# Patient Record
Sex: FEMALE | Race: WHITE | NOT HISPANIC OR LATINO | Employment: FULL TIME | URBAN - METROPOLITAN AREA
[De-identification: names, ages, dates, MRNs, and addresses within clinical notes are randomized per-mention and may not be internally consistent; named-entity substitution may affect disease eponyms.]

---

## 2018-01-14 NOTE — RESULT NOTES
Verified Results  (Q) THINPREP TIS PAP AND HPV mRNA E6/E7 REFLEX HPV 16,18/45 [id number] 12:00AM Chapo Moreno     Test Name Result Flag Reference   CLINICAL INFORMATION:       / DISTANT HX OF MEE I   LMP:      NONE GIVEN   PREV  PAP:      NONE GIVEN   PREV  BX:      NONE GIVEN   SOURCE:      Cervix, Endocervix   STATEMENT OF ADEQUACY:      Satisfactory for evaluation  Endocervical/transformation zone component  present  Age and/or menstrual status not provided   INTERPRETATION/RESULT:      Negative for intraepithelial lesion or malignancy  INFECTION:      Fungal organisms morphologically consistent with  Patsy spp  COMMENT:      This Pap test has been evaluated with computer  assisted technology  CYTOTECHNOLOGIST:      SARAH RANDHAWA(ASCP)  CT screening location: [facility], [address]   REVIEW CYTOTECHNOLOGIST:      SARAH CABRERA(ASCP)  CT screening location: [facility]  [facility], [address] [place] [address]   HPV mRNA E6/E7 Not Detected  Not Detected   This test was performed using the APTIMA HPV Assay (Ej 32 )  This assay detects E6/E7 viral messenger RNA (mRNA) from 14  high-risk HPV types (16,18,31,33,35,39,45,51,52,56,58,59,66,68)

## 2019-01-08 ENCOUNTER — OFFICE VISIT (OUTPATIENT)
Dept: FAMILY MEDICINE CLINIC | Facility: CLINIC | Age: 44
End: 2019-01-08
Payer: COMMERCIAL

## 2019-01-08 VITALS
WEIGHT: 215 LBS | HEART RATE: 96 BPM | DIASTOLIC BLOOD PRESSURE: 78 MMHG | SYSTOLIC BLOOD PRESSURE: 120 MMHG | BODY MASS INDEX: 38.09 KG/M2 | HEIGHT: 63 IN

## 2019-01-08 DIAGNOSIS — F41.9 ANXIETY: ICD-10-CM

## 2019-01-08 DIAGNOSIS — R63.5 EXCESSIVE WEIGHT GAIN: Primary | ICD-10-CM

## 2019-01-08 DIAGNOSIS — E66.9 OBESITY (BMI 35.0-39.9 WITHOUT COMORBIDITY): ICD-10-CM

## 2019-01-08 DIAGNOSIS — R00.2 PALPITATIONS: ICD-10-CM

## 2019-01-08 DIAGNOSIS — Z12.39 BREAST CANCER SCREENING: ICD-10-CM

## 2019-01-08 PROCEDURE — 3725F SCREEN DEPRESSION PERFORMED: CPT | Performed by: FAMILY MEDICINE

## 2019-01-08 PROCEDURE — 99203 OFFICE O/P NEW LOW 30 MIN: CPT | Performed by: FAMILY MEDICINE

## 2019-01-08 RX ORDER — CHOLECALCIFEROL (VITAMIN D3) 125 MCG
2000 CAPSULE ORAL DAILY
COMMUNITY

## 2019-01-08 RX ORDER — ASPIRIN 81 MG/1
81 TABLET ORAL DAILY
COMMUNITY
End: 2019-03-18 | Stop reason: SINTOL

## 2019-01-08 RX ORDER — DIPHENOXYLATE HYDROCHLORIDE AND ATROPINE SULFATE 2.5; .025 MG/1; MG/1
1 TABLET ORAL DAILY
COMMUNITY

## 2019-01-08 RX ORDER — ZINC GLUCONATE 50 MG
TABLET ORAL
COMMUNITY

## 2019-01-08 RX ORDER — PARSLEY 450 MG
CAPSULE ORAL
COMMUNITY

## 2019-01-08 NOTE — PATIENT INSTRUCTIONS

## 2019-01-08 NOTE — PROGRESS NOTES
Assessment/Plan:    Anxiety  Patient would prefer to wait until the lab tests are back to possibly start and antianxiety agent  Breast cancer screening  Mammogram ordered    Excessive weight gain  Patient to get fasting labs and follow-up in 1 week  There is no family history of diabetes  Palpitations  She had a full cardiac workup which was negative  Diagnoses and all orders for this visit:    Excessive weight gain  -     CBC and differential  -     Comprehensive metabolic panel  -     Lipid Panel with Direct LDL reflex  -     TSH, 3rd generation  -     Ambulatory referral to Endocrinology; Future    Breast cancer screening  -     Mammo screening bilateral w 3d & cad; Future    Palpitations  -     CBC and differential  -     Comprehensive metabolic panel  -     Lipid Panel with Direct LDL reflex  -     TSH, 3rd generation  -     Ambulatory referral to Endocrinology; Future    Anxiety  -     CBC and differential  -     Comprehensive metabolic panel  -     Lipid Panel with Direct LDL reflex  -     TSH, 3rd generation  -     Ambulatory referral to Endocrinology; Future    Obesity (BMI 35 0-39 9 without comorbidity)    Other orders  -     Zinc 50 MG TABS; Take by mouth  -     Ashwagandha 500 MG CAPS; Take by mouth  -     Cholecalciferol (VITAMIN D3) 2000 units TABS; Take 2,000 Units by mouth daily  -     Garlic 4753 MG CAPS; Take by mouth  -     multivitamin (THERAGRAN) TABS; Take 1 tablet by mouth daily  -     aspirin (ECOTRIN LOW STRENGTH) 81 mg EC tablet; Take 81 mg by mouth daily          Subjective:   Pt c/o feeling jittery, "constant panic", racing heart, difficulty sleeping  Difficulty losing weight  Hair growth on face  Symptoms present "years"  Notes symptoms are somewhat improved over summer months  -  [facility]     Patient ID: Dinesh Figueredo is a 44 y o  female      This is a 50-year-old female who comes in with symptoms of rapid heart rate and was in the emergency room whereby she had a full cardiac workup by a cardiologist which was negative  She does have a history of anxiety and there is a history of anxiety in the family  She feels jittery at times and can work herself into a panic attack  She has had a weight gain and has been unable to get the weight off and her weight is currently 215 lb  Her BMI was 38 0  Her blood pressure is stable at 120/78  She does have a fatty lump on her posterior neck and does have some menstrual irregularities  She is also concerned about the hair growth on her face  She is requesting an endocrinology consult and that will be arranged along with basic lab work  The following portions of the patient's history were reviewed and updated as appropriate: allergies, current medications, past family history, past medical history, past social history, past surgical history and problem list     Review of Systems   Constitutional: Negative  Negative for fatigue and fever  HENT: Negative  Eyes: Negative  Respiratory: Negative  Negative for shortness of breath  Cardiovascular: Positive for palpitations  Negative for chest pain  Gastrointestinal: Negative  Endocrine: Negative  Genitourinary: Positive for menstrual problem  Musculoskeletal: Negative  Skin: Negative  Allergic/Immunologic: Negative  Neurological: Negative  Hematological: Negative  Psychiatric/Behavioral: Negative  History of anxiety         Objective:      /78 (BP Location: Left arm, Patient Position: Sitting, Cuff Size: Large)   Pulse 96   Ht 5' 3" (1 6 m)   Wt 97 5 kg (215 lb)   BMI 38 09 kg/m²          Physical Exam   Constitutional: She is oriented to person, place, and time  She appears well-developed and well-nourished  HENT:   Head: Normocephalic  Right Ear: External ear normal    Left Ear: External ear normal    Mouth/Throat: Oropharynx is clear and moist    Eyes: Pupils are equal, round, and reactive to light   Conjunctivae and EOM are normal    Neck: Normal range of motion  Neck supple  Cardiovascular: Normal rate, regular rhythm and normal heart sounds  Pulmonary/Chest: Effort normal and breath sounds normal    Abdominal: Soft  Bowel sounds are normal    Musculoskeletal: Normal range of motion  Neurological: She is alert and oriented to person, place, and time  Skin: Skin is warm  Psychiatric: She has a normal mood and affect  Her behavior is normal  Judgment and thought content normal          BMI Counseling: Body mass index is 38 09 kg/m²  Discussed the patient's BMI with her  The BMI is above average  BMI counseling and education was provided to the patient  Nutrition recommendations include reducing portion sizes, decreasing overall calorie intake, moderation in carbohydrate intake and reducing intake of cholesterol  Exercise recommendations include moderate aerobic physical activity for 150 minutes/week

## 2019-01-11 ENCOUNTER — APPOINTMENT (OUTPATIENT)
Dept: LAB | Facility: HOSPITAL | Age: 44
End: 2019-01-11
Payer: COMMERCIAL

## 2019-01-11 LAB
ALBUMIN SERPL BCP-MCNC: 3.6 G/DL (ref 3.5–5)
ALP SERPL-CCNC: 74 U/L (ref 46–116)
ALT SERPL W P-5'-P-CCNC: 36 U/L (ref 12–78)
ANION GAP SERPL CALCULATED.3IONS-SCNC: 8 MMOL/L (ref 4–13)
AST SERPL W P-5'-P-CCNC: 18 U/L (ref 5–45)
BASOPHILS # BLD AUTO: 0.08 THOUSANDS/ΜL (ref 0–0.1)
BASOPHILS NFR BLD AUTO: 1 % (ref 0–1)
BILIRUB SERPL-MCNC: 0.67 MG/DL (ref 0.2–1)
BUN SERPL-MCNC: 10 MG/DL (ref 5–25)
CALCIUM SERPL-MCNC: 9.2 MG/DL (ref 8.3–10.1)
CHLORIDE SERPL-SCNC: 103 MMOL/L (ref 100–108)
CHOLEST SERPL-MCNC: 209 MG/DL (ref 50–200)
CO2 SERPL-SCNC: 27 MMOL/L (ref 21–32)
CREAT SERPL-MCNC: 0.79 MG/DL (ref 0.6–1.3)
EOSINOPHIL # BLD AUTO: 0.35 THOUSAND/ΜL (ref 0–0.61)
EOSINOPHIL NFR BLD AUTO: 5 % (ref 0–6)
ERYTHROCYTE [DISTWIDTH] IN BLOOD BY AUTOMATED COUNT: 13.2 % (ref 11.6–15.1)
GFR SERPL CREATININE-BSD FRML MDRD: 93 ML/MIN/1.73SQ M
GLUCOSE P FAST SERPL-MCNC: 99 MG/DL (ref 65–99)
HCT VFR BLD AUTO: 40.7 % (ref 34.8–46.1)
HDLC SERPL-MCNC: 39 MG/DL (ref 40–60)
HGB BLD-MCNC: 12.9 G/DL (ref 11.5–15.4)
IMM GRANULOCYTES # BLD AUTO: 0.03 THOUSAND/UL (ref 0–0.2)
IMM GRANULOCYTES NFR BLD AUTO: 0 % (ref 0–2)
LDLC SERPL CALC-MCNC: 151 MG/DL (ref 0–100)
LYMPHOCYTES # BLD AUTO: 1.92 THOUSANDS/ΜL (ref 0.6–4.47)
LYMPHOCYTES NFR BLD AUTO: 26 % (ref 14–44)
MCH RBC QN AUTO: 28.3 PG (ref 26.8–34.3)
MCHC RBC AUTO-ENTMCNC: 31.7 G/DL (ref 31.4–37.4)
MCV RBC AUTO: 89 FL (ref 82–98)
MONOCYTES # BLD AUTO: 0.44 THOUSAND/ΜL (ref 0.17–1.22)
MONOCYTES NFR BLD AUTO: 6 % (ref 4–12)
NEUTROPHILS # BLD AUTO: 4.51 THOUSANDS/ΜL (ref 1.85–7.62)
NEUTS SEG NFR BLD AUTO: 62 % (ref 43–75)
NRBC BLD AUTO-RTO: 0 /100 WBCS
PLATELET # BLD AUTO: 325 THOUSANDS/UL (ref 149–390)
PMV BLD AUTO: 8.7 FL (ref 8.9–12.7)
POTASSIUM SERPL-SCNC: 4.4 MMOL/L (ref 3.5–5.3)
PROT SERPL-MCNC: 7.7 G/DL (ref 6.4–8.2)
RBC # BLD AUTO: 4.56 MILLION/UL (ref 3.81–5.12)
SODIUM SERPL-SCNC: 138 MMOL/L (ref 136–145)
TRIGL SERPL-MCNC: 96 MG/DL
TSH SERPL DL<=0.05 MIU/L-ACNC: 2.35 UIU/ML (ref 0.36–3.74)
WBC # BLD AUTO: 7.33 THOUSAND/UL (ref 4.31–10.16)

## 2019-01-11 PROCEDURE — 80053 COMPREHEN METABOLIC PANEL: CPT | Performed by: FAMILY MEDICINE

## 2019-01-11 PROCEDURE — 84443 ASSAY THYROID STIM HORMONE: CPT | Performed by: FAMILY MEDICINE

## 2019-01-11 PROCEDURE — 85025 COMPLETE CBC W/AUTO DIFF WBC: CPT | Performed by: FAMILY MEDICINE

## 2019-01-11 PROCEDURE — 80061 LIPID PANEL: CPT | Performed by: FAMILY MEDICINE

## 2019-01-11 PROCEDURE — 36415 COLL VENOUS BLD VENIPUNCTURE: CPT | Performed by: FAMILY MEDICINE

## 2019-01-14 ENCOUNTER — HOSPITAL ENCOUNTER (OUTPATIENT)
Dept: MAMMOGRAPHY | Facility: MEDICAL CENTER | Age: 44
Discharge: HOME | End: 2019-01-14
Payer: COMMERCIAL

## 2019-01-14 VITALS — WEIGHT: 215 LBS | HEIGHT: 63 IN | BODY MASS INDEX: 38.09 KG/M2

## 2019-01-14 DIAGNOSIS — Z12.39 BREAST CANCER SCREENING: ICD-10-CM

## 2019-01-14 PROCEDURE — 77067 SCR MAMMO BI INCL CAD: CPT

## 2019-01-14 PROCEDURE — 77063 BREAST TOMOSYNTHESIS BI: CPT

## 2019-01-18 ENCOUNTER — TELEPHONE (OUTPATIENT)
Dept: FAMILY MEDICINE CLINIC | Facility: CLINIC | Age: 44
End: 2019-01-18

## 2019-01-18 ENCOUNTER — OFFICE VISIT (OUTPATIENT)
Dept: FAMILY MEDICINE CLINIC | Facility: CLINIC | Age: 44
End: 2019-01-18
Payer: COMMERCIAL

## 2019-01-18 VITALS
HEIGHT: 63 IN | SYSTOLIC BLOOD PRESSURE: 112 MMHG | WEIGHT: 215 LBS | HEART RATE: 68 BPM | DIASTOLIC BLOOD PRESSURE: 76 MMHG | BODY MASS INDEX: 38.09 KG/M2

## 2019-01-18 DIAGNOSIS — R63.5 EXCESSIVE WEIGHT GAIN: ICD-10-CM

## 2019-01-18 DIAGNOSIS — E78.2 MIXED HYPERLIPIDEMIA: Primary | ICD-10-CM

## 2019-01-18 DIAGNOSIS — F41.9 ANXIETY: ICD-10-CM

## 2019-01-18 DIAGNOSIS — E66.9 OBESITY (BMI 35.0-39.9 WITHOUT COMORBIDITY): ICD-10-CM

## 2019-01-18 PROCEDURE — 99214 OFFICE O/P EST MOD 30 MIN: CPT | Performed by: FAMILY MEDICINE

## 2019-01-18 PROCEDURE — 3008F BODY MASS INDEX DOCD: CPT | Performed by: FAMILY MEDICINE

## 2019-01-18 RX ORDER — ATORVASTATIN CALCIUM 10 MG/1
10 TABLET, FILM COATED ORAL DAILY
Qty: 90 TABLET | Refills: 3 | Status: SHIPPED | OUTPATIENT
Start: 2019-01-18 | End: 2019-03-18 | Stop reason: SINTOL

## 2019-01-18 NOTE — TELEPHONE ENCOUNTER
Per Yecenia Mckinney, he want's Pt seen sooner than current appt schedule in Sept 2019  Called Endocrinology office and they stated they can get Pt in to be seen in Feb 2019,  they will check with  and call our office back with date/time  We will then need to call Pt and advise of new appt date and time

## 2019-01-18 NOTE — TELEPHONE ENCOUNTER
Rec'd a call back from \[facility]\"" at the Endocrinologist   Pt's appt is 2/20/19 at 9:20AM at the [facility] location  Called Pt and advised on new appt date/time/location

## 2019-01-18 NOTE — PROGRESS NOTES
Assessment/Plan:    Anxiety  The patient declines anti- anxiety medication at this time  Mixed hyperlipidemia  Total cholesterol was 209 and her LDL was 151  Both of her parents are on cholesterol medication and she was started on a atorvastatin 10 mg 1 daily today  Obesity (BMI 35 0-39 9 without comorbidity)  Her BMI is 38 0 and at her visit last week we discussed diet, exercise and weight loss  Excessive weight gain  Patient's endocrinology appointment will try to be moved up because she was given an appointment for September of 2019 and she needs to be seen sooner  Diagnoses and all orders for this visit:    Mixed hyperlipidemia  -     atorvastatin (LIPITOR) 10 mg tablet; Take 1 tablet (10 mg total) by mouth daily  -     Lipid Panel with Direct LDL reflex; Future  -     Comprehensive metabolic panel; Future    Anxiety    Obesity (BMI 35 0-39 9 without comorbidity)    Excessive weight gain          Subjective: Follow up anxiousness  Review BW results  -  [facility]     Patient ID: Dante Araiza is a 44 y o  female  This is a 42-year-old female who comes in for recheck of her labs which were done approximately a week ago  She is thinking that she has Cushing's syndrome and she was set up for an endocrinology appointment however they could not get her in until September of 2019  Arrangements are being made to try to get her in to an endocrinologist sooner  Blood pressure is 112/76 and her weight remains the same at 215 lb  BMI is 38 0  Reviewing her labs, her TSH was normal, CBC was within normal range, glucose was borderline at 99 and her LDL was elevated at 151  Both of her parents have hyperlipidemia and are on medication  Total cholesterol was 209 and her triglycerides were 96 and her HDL was 39           The following portions of the patient's history were reviewed and updated as appropriate: allergies, current medications, past family history, past medical history, past social history, past surgical history and problem list     Review of Systems   Constitutional: Negative  HENT: Negative  Eyes: Negative  Respiratory: Negative  Cardiovascular: Negative  Gastrointestinal: Negative  Endocrine: Negative  Genitourinary: Negative  Musculoskeletal: Negative  Skin: Negative  Allergic/Immunologic: Negative  Neurological: Negative  Hematological: Negative  Psychiatric/Behavioral: Negative  Objective:      /76 (BP Location: Left arm, Patient Position: Sitting, Cuff Size: Large)   Pulse 68   Ht 5' 3" (1 6 m)   Wt 97 5 kg (215 lb)   LMP 01/11/2019   BMI 38 09 kg/m²          Physical Exam   Constitutional: She is oriented to person, place, and time  She appears well-developed and well-nourished  HENT:   Head: Normocephalic  Right Ear: External ear normal    Left Ear: External ear normal    Mouth/Throat: Oropharynx is clear and moist    Eyes: Pupils are equal, round, and reactive to light  Conjunctivae and EOM are normal    Neck: Normal range of motion  Neck supple  Cardiovascular: Normal rate, regular rhythm and normal heart sounds  Pulmonary/Chest: Effort normal and breath sounds normal    Abdominal: Soft  Bowel sounds are normal    Musculoskeletal: Normal range of motion  Neurological: She is alert and oriented to person, place, and time  Skin: Skin is warm  Psychiatric: She has a normal mood and affect  Her behavior is normal  Judgment and thought content normal    Nursing note and vitals reviewed  BMI Counseling: Body mass index is 38 09 kg/m²  Discussed the patient's BMI with her  The BMI is above average  BMI counseling and education was provided to the patient   Nutrition recommendations include reducing portion sizes, decreasing overall calorie intake, 3-5 servings of fruits/vegetables daily, reducing fast food intake, consuming healthier snacks, decreasing soda and/or juice intake, moderation in carbohydrate intake and reducing intake of cholesterol  Exercise recommendations include moderate aerobic physical activity for 150 minutes/week

## 2019-01-18 NOTE — PATIENT INSTRUCTIONS

## 2019-02-20 ENCOUNTER — CONSULT (OUTPATIENT)
Dept: ENDOCRINOLOGY | Facility: CLINIC | Age: 44
End: 2019-02-20
Payer: COMMERCIAL

## 2019-02-20 VITALS
HEIGHT: 63 IN | DIASTOLIC BLOOD PRESSURE: 88 MMHG | BODY MASS INDEX: 39.62 KG/M2 | WEIGHT: 223.6 LBS | HEART RATE: 89 BPM | SYSTOLIC BLOOD PRESSURE: 130 MMHG

## 2019-02-20 DIAGNOSIS — E66.9 OBESITY (BMI 35.0-39.9 WITHOUT COMORBIDITY): ICD-10-CM

## 2019-02-20 DIAGNOSIS — N92.6 IRREGULAR MENSTRUAL CYCLE: ICD-10-CM

## 2019-02-20 DIAGNOSIS — L68.0 HIRSUTISM: ICD-10-CM

## 2019-02-20 DIAGNOSIS — R63.5 ABNORMAL WEIGHT GAIN: Primary | ICD-10-CM

## 2019-02-20 DIAGNOSIS — G47.9 SLEEP DISTURBANCES: ICD-10-CM

## 2019-02-20 DIAGNOSIS — M79.10 MYALGIA: ICD-10-CM

## 2019-02-20 PROCEDURE — 99244 OFF/OP CNSLTJ NEW/EST MOD 40: CPT | Performed by: INTERNAL MEDICINE

## 2019-02-20 RX ORDER — DEXAMETHASONE 1 MG
TABLET ORAL
Qty: 1 TABLET | Refills: 0 | Status: SHIPPED | OUTPATIENT
Start: 2019-02-20 | End: 2019-03-18 | Stop reason: ALTCHOICE

## 2019-02-20 NOTE — ASSESSMENT & PLAN NOTE
Differential diagnosis include PCOS, thyroid dysfunction, Cushing's or hyperprolactinemia-will order labs for workup

## 2019-02-20 NOTE — LETTER
February 20, 2019     [id number]  [place] [address] [place]    Patient: Blaze De La Cruz   YOB: 1975   Date of Visit: 2/20/2019       Dear Dr Adán Baltazar:    Thank you for referring Erlinda Alonso to me for evaluation  Below are my notes for this consultation  If you have questions, please do not hesitate to call me  I look forward to following your patient along with you           Sincerely,        Peace Demarco MD        CC: No Recipients  Peace Demarco MD  2/20/2019 11:32 AM  Sign at close encounter   Blaze De La Cruz 44 y o  female MRN: [id number]    Encounter: [id number]      Assessment/Plan   Problem List Items Addressed This Visit        Musculoskeletal and Integument    Hirsutism     Will check total and free testosterone as well as DHEA sulfate         Relevant Orders    DHEA-sulfate Lab Collect       Other    Obesity (BMI 35 0-39 9 without comorbidity)    Relevant Orders    Vitamin D 25 hydroxy Lab Collect    Ambulatory referral to Sleep Medicine    Abnormal weight gain - Primary     Dexamethasone suppression test to rule out Cushing's         Relevant Medications    dexamethasone (DECADRON) 1 mg tablet    Other Relevant Orders    TSH, 3rd generation Lab Collect    T4, free Lab Collect    Cortisol Level, AM Specimen Lab Collect    Dexamethasone    Phosphorus Lab Collect    Basic metabolic panel Lab Collect    Ambulatory referral to Sleep Medicine    Irregular menstrual cycle     Differential diagnosis include PCOS, thyroid dysfunction, Cushing's or hyperprolactinemia-will order labs for workup         Relevant Orders    Prolactin Lab Collect    Testosterone, free, total Lab Collect    Sleep disturbances     Referred for sleep evaluation         Relevant Orders    Ambulatory referral to Sleep Medicine    Myalgia    Relevant Orders    Vitamin D 25 hydroxy Lab Collect    Phosphorus Lab Collect    Basic metabolic panel Lab Collect          CC:   Weight gain    History of Present Illness     HPI:  42-year-old woman referred here for evaluation of multiple symptoms  She has been complaining fatigue , sleep disturbances , difficulty loosing weight the past few years  Weight gain 15-20 lb in the past couple of years  C/o palpitations , anxiety , panic attacks   C/o myalgias ,   C/o loose BM - since jan  Menstrual cycles irregular -for the past 2 years -no galactorrhea   C/o hirsutism ,skin breakouts - shaves every few days   C/o sob on exertion on climbing stairs   No strea       Review of Systems   Constitutional: Positive for fatigue  Eyes: Negative for visual disturbance  Respiratory: Positive for shortness of breath  Negative for cough  Cardiovascular: Positive for palpitations  Negative for leg swelling  Gastrointestinal: Positive for diarrhea  Negative for constipation, nausea and vomiting  Endocrine: Negative for polydipsia and polyuria  Genitourinary: Positive for menstrual problem  Musculoskeletal: Positive for arthralgias and myalgias  Negative for gait problem  Skin: Negative for color change, pallor, rash and wound  Neurological: Positive for weakness  Psychiatric/Behavioral: Positive for sleep disturbance  The patient is nervous/anxious  All other systems reviewed and are negative        Historical Information   Past Medical History:   Diagnosis Date    Breast nodule 2009    Fibroglandular changes    Migraine with aura and without status migrainosus, not intractable     Peptic ulcer      Past Surgical History:   Procedure Laterality Date    ORTHODONTIC TREATMENT      16 yo; incisors pulled down    WISDOM TOOTH EXTRACTION       Social History   Social History     Substance and Sexual Activity   Alcohol Use Yes    Comment: social 1x-2x per month     Social History     Substance and Sexual Activity   Drug Use No     Social History     Tobacco Use   Smoking Status Never Smoker   Smokeless Tobacco Never Used     Family History:   Family History   Problem Relation Age of Onset    Rheum arthritis Mother     Hypertension Mother     Hyperlipidemia Mother     Hepatitis Father         B    Hyperlipidemia Father     Hypertension Father     Lung cancer Maternal Grandmother         over [de-identified]    Rheum arthritis Maternal Grandmother     Kidney disease Maternal Grandfather     Anxiety disorder Paternal Grandmother     Depression Paternal Grandmother     Bipolar disorder Paternal Grandmother     Lung cancer Paternal Grandmother         over [de-identified]    Breast cancer Maternal Aunt 29    Nephrolithiasis Family     Anxiety disorder Sister     Depression Sister     Kidney disease Paternal Grandfather        Meds/Allergies   Current Outpatient Medications   Medication Sig Dispense Refill    Ashwagandha 500 MG CAPS Take by mouth      atorvastatin (LIPITOR) 10 mg tablet Take 1 tablet (10 mg total) by mouth daily 90 tablet 3    Cholecalciferol (VITAMIN D3) 2000 units TABS Take 2,000 Units by mouth daily      Garlic 1001 MG CAPS Take by mouth      multivitamin (THERAGRAN) TABS Take 1 tablet by mouth daily      Zinc 50 MG TABS Take by mouth      aspirin (ECOTRIN LOW STRENGTH) 81 mg EC tablet Take 81 mg by mouth daily      dexamethasone (DECADRON) 1 mg tablet 1 tab at 11 pm the night  before labs 1 tablet 0     No current facility-administered medications for this visit  No Known Allergies    Objective   Vitals: Blood pressure 130/88, pulse 89, height 5' 3" (1 6 m), weight 101 kg (223 lb 9 6 oz)  Physical Exam   Constitutional: She is oriented to person, place, and time  She appears well-developed and well-nourished  No distress  Central obesity   HENT:   Head: Normocephalic and atraumatic  Eyes: EOM are normal  No scleral icterus  Neck: Normal range of motion  Neck supple  No thyromegaly present  Dorsal pad of fat   Cardiovascular: Normal rate, regular rhythm and normal heart sounds  No murmur heard    Pulmonary/Chest: Effort normal and breath sounds normal  No respiratory distress  She has no wheezes  She has no rales  Abdominal: Soft  Bowel sounds are normal  She exhibits no distension  There is no tenderness  There is no guarding  Musculoskeletal: Normal range of motion  She exhibits no edema or deformity  Neurological: She is alert and oriented to person, place, and time  Skin: Skin is warm and dry  Psychiatric: She has a normal mood and affect  Her behavior is normal  Judgment and thought content normal        The history was obtained from the review of the chart, patient and family  Lab Results:   Lab Results   Component Value Date/Time    Potassium 4 4 01/11/2019 09:20 AM    Chloride 103 01/11/2019 09:20 AM    CO2 27 01/11/2019 09:20 AM    BUN 10 01/11/2019 09:20 AM    Creatinine 0 79 01/11/2019 09:20 AM    Glucose, Fasting 99 01/11/2019 09:20 AM    Calcium 9 2 01/11/2019 09:20 AM    eGFR 93 01/11/2019 09:20 AM    TSH 3RD GENERATON 2 348 01/11/2019 09:20 AM               Portions of the record may have been created with voice recognition software  Occasional wrong word or "sound a like" substitutions may have occurred due to the inherent limitations of voice recognition software  Read the chart carefully and recognize, using context, where substitutions have occurred

## 2019-02-20 NOTE — PROGRESS NOTES
Dong Rodriguez 44 y o  female MRN: [id number]    Encounter: [id number]      Assessment/Plan   Problem List Items Addressed This Visit        Musculoskeletal and Integument    Hirsutism     Will check total and free testosterone as well as DHEA sulfate         Relevant Orders    DHEA-sulfate Lab Collect       Other    Obesity (BMI 35 0-39 9 without comorbidity)    Relevant Orders    Vitamin D 25 hydroxy Lab Collect    Ambulatory referral to Sleep Medicine    Abnormal weight gain - Primary     Dexamethasone suppression test to rule out Cushing's         Relevant Medications    dexamethasone (DECADRON) 1 mg tablet    Other Relevant Orders    TSH, 3rd generation Lab Collect    T4, free Lab Collect    Cortisol Level, AM Specimen Lab Collect    Dexamethasone    Phosphorus Lab Collect    Basic metabolic panel Lab Collect    Ambulatory referral to Sleep Medicine    Irregular menstrual cycle     Differential diagnosis include PCOS, thyroid dysfunction, Cushing's or hyperprolactinemia-will order labs for workup         Relevant Orders    Prolactin Lab Collect    Testosterone, free, total Lab Collect    Sleep disturbances     Referred for sleep evaluation         Relevant Orders    Ambulatory referral to Sleep Medicine    Myalgia    Relevant Orders    Vitamin D 25 hydroxy Lab Collect    Phosphorus Lab Collect    Basic metabolic panel Lab Collect          CC:   Weight gain    History of Present Illness     HPI:  78-year-old woman referred here for evaluation of multiple symptoms  She has been complaining fatigue , sleep disturbances , difficulty loosing weight the past few years  Weight gain 15-20 lb in the past couple of years  C/o palpitations , anxiety , panic attacks   C/o myalgias ,   C/o loose BM - since jan  Menstrual cycles irregular -for the past 2 years -no galactorrhea     C/o hirsutism ,skin breakouts - shaves every few days   C/o sob on exertion on climbing stairs   No strea       Review of Systems Constitutional: Positive for fatigue  Eyes: Negative for visual disturbance  Respiratory: Positive for shortness of breath  Negative for cough  Cardiovascular: Positive for palpitations  Negative for leg swelling  Gastrointestinal: Positive for diarrhea  Negative for constipation, nausea and vomiting  Endocrine: Negative for polydipsia and polyuria  Genitourinary: Positive for menstrual problem  Musculoskeletal: Positive for arthralgias and myalgias  Negative for gait problem  Skin: Negative for color change, pallor, rash and wound  Neurological: Positive for weakness  Psychiatric/Behavioral: Positive for sleep disturbance  The patient is nervous/anxious  All other systems reviewed and are negative        Historical Information   Past Medical History:   Diagnosis Date    Breast nodule 2009    Fibroglandular changes    Migraine with aura and without status migrainosus, not intractable     Peptic ulcer      Past Surgical History:   Procedure Laterality Date    ORTHODONTIC TREATMENT      16 yo; incisors pulled down    WISDOM TOOTH EXTRACTION       Social History   Social History     Substance and Sexual Activity   Alcohol Use Yes    Comment: social 1x-2x per month     Social History     Substance and Sexual Activity   Drug Use No     Social History     Tobacco Use   Smoking Status Never Smoker   Smokeless Tobacco Never Used     Family History:   Family History   Problem Relation Age of Onset    Rheum arthritis Mother     Hypertension Mother     Hyperlipidemia Mother     Hepatitis Father         B    Hyperlipidemia Father     Hypertension Father     Lung cancer Maternal Grandmother         over [de-identified]    Rheum arthritis Maternal Grandmother     Kidney disease Maternal Grandfather     Anxiety disorder Paternal Grandmother     Depression Paternal Grandmother     Bipolar disorder Paternal Grandmother     Lung cancer Paternal Grandmother         over [de-identified]    Breast cancer Maternal Aunt 29    Nephrolithiasis Family     Anxiety disorder Sister     Depression Sister     Kidney disease Paternal Grandfather        Meds/Allergies   Current Outpatient Medications   Medication Sig Dispense Refill    Ashwagandha 500 MG CAPS Take by mouth      atorvastatin (LIPITOR) 10 mg tablet Take 1 tablet (10 mg total) by mouth daily 90 tablet 3    Cholecalciferol (VITAMIN D3) 2000 units TABS Take 2,000 Units by mouth daily      Garlic 8496 MG CAPS Take by mouth      multivitamin (THERAGRAN) TABS Take 1 tablet by mouth daily      Zinc 50 MG TABS Take by mouth      aspirin (ECOTRIN LOW STRENGTH) 81 mg EC tablet Take 81 mg by mouth daily      dexamethasone (DECADRON) 1 mg tablet 1 tab at 11 pm the night  before labs 1 tablet 0     No current facility-administered medications for this visit  No Known Allergies    Objective   Vitals: Blood pressure 130/88, pulse 89, height 5' 3" (1 6 m), weight 101 kg (223 lb 9 6 oz)  Physical Exam   Constitutional: She is oriented to person, place, and time  She appears well-developed and well-nourished  No distress  Central obesity   HENT:   Head: Normocephalic and atraumatic  Eyes: EOM are normal  No scleral icterus  Neck: Normal range of motion  Neck supple  No thyromegaly present  Dorsal pad of fat   Cardiovascular: Normal rate, regular rhythm and normal heart sounds  No murmur heard  Pulmonary/Chest: Effort normal and breath sounds normal  No respiratory distress  She has no wheezes  She has no rales  Abdominal: Soft  Bowel sounds are normal  She exhibits no distension  There is no tenderness  There is no guarding  Musculoskeletal: Normal range of motion  She exhibits no edema or deformity  Neurological: She is alert and oriented to person, place, and time  Skin: Skin is warm and dry  Psychiatric: She has a normal mood and affect   Her behavior is normal  Judgment and thought content normal        The history was obtained from the review of the chart, patient and family  Lab Results:   Lab Results   Component Value Date/Time    Potassium 4 4 01/11/2019 09:20 AM    Chloride 103 01/11/2019 09:20 AM    CO2 27 01/11/2019 09:20 AM    BUN 10 01/11/2019 09:20 AM    Creatinine 0 79 01/11/2019 09:20 AM    Glucose, Fasting 99 01/11/2019 09:20 AM    Calcium 9 2 01/11/2019 09:20 AM    eGFR 93 01/11/2019 09:20 AM    TSH 3RD GENERATON 2 348 01/11/2019 09:20 AM               Portions of the record may have been created with voice recognition software  Occasional wrong word or "sound a like" substitutions may have occurred due to the inherent limitations of voice recognition software  Read the chart carefully and recognize, using context, where substitutions have occurred

## 2019-03-01 ENCOUNTER — LAB (OUTPATIENT)
Dept: LAB | Facility: HOSPITAL | Age: 44
End: 2019-03-01
Attending: INTERNAL MEDICINE
Payer: COMMERCIAL

## 2019-03-01 ENCOUNTER — TELEPHONE (OUTPATIENT)
Dept: ENDOCRINOLOGY | Facility: CLINIC | Age: 44
End: 2019-03-01

## 2019-03-01 DIAGNOSIS — E66.9 OBESITY (BMI 35.0-39.9 WITHOUT COMORBIDITY): ICD-10-CM

## 2019-03-01 DIAGNOSIS — N92.6 IRREGULAR MENSTRUAL CYCLE: ICD-10-CM

## 2019-03-01 DIAGNOSIS — L68.0 HIRSUTISM: ICD-10-CM

## 2019-03-01 DIAGNOSIS — M79.10 MYALGIA: ICD-10-CM

## 2019-03-01 DIAGNOSIS — R63.5 ABNORMAL WEIGHT GAIN: ICD-10-CM

## 2019-03-01 LAB
25(OH)D3 SERPL-MCNC: 25.9 NG/ML (ref 30–100)
ANION GAP SERPL CALCULATED.3IONS-SCNC: 9 MMOL/L (ref 4–13)
BUN SERPL-MCNC: 10 MG/DL (ref 5–25)
CALCIUM SERPL-MCNC: 9 MG/DL (ref 8.3–10.1)
CHLORIDE SERPL-SCNC: 101 MMOL/L (ref 100–108)
CO2 SERPL-SCNC: 27 MMOL/L (ref 21–32)
CORTIS AM PEAK SERPL-MCNC: 0.5 UG/DL (ref 4.2–22.4)
CREAT SERPL-MCNC: 0.8 MG/DL (ref 0.6–1.3)
GFR SERPL CREATININE-BSD FRML MDRD: 91 ML/MIN/1.73SQ M
GLUCOSE P FAST SERPL-MCNC: 112 MG/DL (ref 65–99)
PHOSPHATE SERPL-MCNC: 3.2 MG/DL (ref 2.7–4.5)
POTASSIUM SERPL-SCNC: 3.8 MMOL/L (ref 3.5–5.3)
PROLACTIN SERPL-MCNC: 5.1 NG/ML
SODIUM SERPL-SCNC: 137 MMOL/L (ref 136–145)
T4 FREE SERPL-MCNC: 0.94 NG/DL (ref 0.76–1.46)
TSH SERPL DL<=0.05 MIU/L-ACNC: 2.05 UIU/ML (ref 0.36–3.74)

## 2019-03-01 PROCEDURE — 84443 ASSAY THYROID STIM HORMONE: CPT

## 2019-03-01 PROCEDURE — 82533 TOTAL CORTISOL: CPT

## 2019-03-01 PROCEDURE — 80375 DRUG/SUBSTANCE NOS 1-3: CPT

## 2019-03-01 PROCEDURE — 84146 ASSAY OF PROLACTIN: CPT

## 2019-03-01 PROCEDURE — 84100 ASSAY OF PHOSPHORUS: CPT

## 2019-03-01 PROCEDURE — 84403 ASSAY OF TOTAL TESTOSTERONE: CPT

## 2019-03-01 PROCEDURE — 84439 ASSAY OF FREE THYROXINE: CPT

## 2019-03-01 PROCEDURE — 84402 ASSAY OF FREE TESTOSTERONE: CPT

## 2019-03-01 PROCEDURE — 36415 COLL VENOUS BLD VENIPUNCTURE: CPT

## 2019-03-01 PROCEDURE — 80048 BASIC METABOLIC PNL TOTAL CA: CPT

## 2019-03-01 PROCEDURE — 82627 DEHYDROEPIANDROSTERONE: CPT

## 2019-03-01 PROCEDURE — 82306 VITAMIN D 25 HYDROXY: CPT

## 2019-03-01 NOTE — RESULT ENCOUNTER NOTE
Please call the patient regarding labs - some of the labs are still pending however from the labs that have been received-low cortisol which is an appropriate response to dexamethasone suppression test  Vitamin-D is low-start vitamin D3 2000 international units daily    Fasting glucose was a little high but that is likely secondary to the steroid that she took last night-will repeat in the future

## 2019-03-01 NOTE — TELEPHONE ENCOUNTER
----- Message from Edward Bai MD sent at 3/1/2019 11:31 AM EST -----  Please call the patient regarding labs - some of the labs are still pending however from the labs that have been received-low cortisol which is an appropriate response to dexamethasone suppression test  Vitamin-D is low-start vitamin D3 2000 international units daily    Fasting glucose was a little high but that is likely secondary to the steroid that she took last night-will repeat in the future

## 2019-03-02 LAB — DHEA-S SERPL-MCNC: 205 UG/DL (ref 57.3–279.2)

## 2019-03-03 LAB
TESTOST FREE SERPL-MCNC: 0.4 PG/ML (ref 0–4.2)
TESTOST SERPL-MCNC: 19 NG/DL (ref 8–48)

## 2019-03-09 NOTE — ASSESSMENT & PLAN NOTE
Her BMI is 38 0 and at her visit last week we discussed diet, exercise and weight loss 
Patient's endocrinology appointment will try to be moved up because she was given an appointment for September of 2019 and she needs to be seen sooner 
The patient declines anti- anxiety medication at this time 
Total cholesterol was 209 and her LDL was 151  Both of her parents are on cholesterol medication and she was started on a atorvastatin 10 mg 1 daily today 
WDL

## 2019-03-14 LAB — MISCELLANEOUS LAB TEST RESULT: NORMAL

## 2019-03-18 ENCOUNTER — OFFICE VISIT (OUTPATIENT)
Dept: FAMILY MEDICINE CLINIC | Facility: CLINIC | Age: 44
End: 2019-03-18
Payer: COMMERCIAL

## 2019-03-18 VITALS
WEIGHT: 213 LBS | DIASTOLIC BLOOD PRESSURE: 74 MMHG | BODY MASS INDEX: 37.74 KG/M2 | SYSTOLIC BLOOD PRESSURE: 140 MMHG | HEIGHT: 63 IN | HEART RATE: 100 BPM

## 2019-03-18 DIAGNOSIS — E66.9 OBESITY (BMI 35.0-39.9 WITHOUT COMORBIDITY): ICD-10-CM

## 2019-03-18 DIAGNOSIS — R07.9 CHEST PAIN, UNSPECIFIED TYPE: Primary | ICD-10-CM

## 2019-03-18 DIAGNOSIS — F41.9 ANXIETY: ICD-10-CM

## 2019-03-18 DIAGNOSIS — E78.2 MIXED HYPERLIPIDEMIA: ICD-10-CM

## 2019-03-18 PROCEDURE — 93000 ELECTROCARDIOGRAM COMPLETE: CPT | Performed by: FAMILY MEDICINE

## 2019-03-18 PROCEDURE — 99214 OFFICE O/P EST MOD 30 MIN: CPT | Performed by: FAMILY MEDICINE

## 2019-03-18 RX ORDER — PAROXETINE 10 MG/1
TABLET, FILM COATED ORAL
Qty: 30 TABLET | Refills: 3 | Status: SHIPPED | OUTPATIENT
Start: 2019-03-18 | End: 2019-10-08 | Stop reason: SDUPTHER

## 2019-03-18 RX ORDER — VITAMIN B COMPLEX
1 CAPSULE ORAL DAILY
COMMUNITY

## 2019-03-18 NOTE — ASSESSMENT & PLAN NOTE
Her weight is currently 213 lb down 10 lb from the previous visit and her BMI is 37 7  She knows she needs to continue to lose weight

## 2019-03-18 NOTE — ASSESSMENT & PLAN NOTE
The patient was placed on Paxil 5 mg daily because she is very sensitive to medication  She will return in 3-4 weeks for evaluation of whether to increase the medication to 10 mg daily or to leave it at 5 mg daily

## 2019-03-18 NOTE — ASSESSMENT & PLAN NOTE
Her previous lipid panel showed the LDL being 151 and was placed on atorvastatin 10 mg daily and she was unable to take the medication due to side effects of joint and muscle pain  She will be referred to [facility] Grimaldo

## 2019-03-18 NOTE — PROGRESS NOTES
Assessment and Plan:  Follow-up 3-4 weeks to recheck and see how the Paxil is working  Problem List Items Addressed This Visit        Other    Anxiety     The patient was placed on Paxil 5 mg daily because she is very sensitive to medication  She will return in 3-4 weeks for evaluation of whether to increase the medication to 10 mg daily or to leave it at 5 mg daily  Relevant Medications    PARoxetine (PAXIL) 10 mg tablet    Mixed hyperlipidemia     Her previous lipid panel showed the LDL being 151 and was placed on atorvastatin 10 mg daily and she was unable to take the medication due to side effects of joint and muscle pain  She will be referred to [facility]  Relevant Orders    Ambulatory referral to Cardiology    Obesity (BMI 35 0-39 9 without comorbidity)     Her weight is currently 213 lb down 10 lb from the previous visit and her BMI is 37 7  She knows she needs to continue to lose weight  Chest pain - Primary     Her EKG was read by Dr Jossie Jones as within normal limits and no acute changes  Relevant Orders    POCT ECG (Completed)                 Diagnoses and all orders for this visit:    Chest pain, unspecified type  -     POCT ECG    Anxiety  -     PARoxetine (PAXIL) 10 mg tablet; 1/2 tablet daily    Mixed hyperlipidemia  -     Ambulatory referral to Cardiology; Future    Obesity (BMI 35 0-39 9 without comorbidity)    Other orders  -     b complex vitamins capsule; Take 1 capsule by mouth daily              Subjective:      Patient ID: Vidal Murillo is a 44 y o  female  CC:    Chief Complaint   Patient presents with    Anxiety     pt states she has had multiple issues starting with side effects of Lipitor  She notes she had muscle and joint pain after 2 weeks of starting  medications  Endocrinologist recommended she stop all meds  Pt had fasting BW done 2 weeks ago  She now questions cardiac issues due left sided chest pain / heart burn    - [facility]       HPI:    This is a 46-year-old female who comes in complaining of chest pain which is is left-sided and radiating down the left arm  This has been off and on for several weeks now  Her father talked her into coming in to getting an EKG done here in the office and it was read by Dr Patricio Armstrong as no acute changes  She has been seen in the past by Dr Bryan Bhandari for an abnormal EKG  Her last lipid panel had her LDL being 151 and she was on the Lipitor for 2 weeks and was unable to take the medication due to side effects of muscle and joint pain  Her blood pressure today was 140/74 and her weight is down 10 lb from the previous visit to 213 lb with a BMI of 37 7  She is requesting some anxiety medicine due to her very stressful job and she will be placed on Paxil at 5 mg daily for 3-4 weeks  The following portions of the patient's history were reviewed and updated as appropriate: allergies, current medications, past family history, past medical history, past social history, past surgical history and problem list       Review of Systems   Constitutional: Negative  HENT: Negative  Eyes: Negative  Respiratory: Negative  Cardiovascular: Positive for chest pain  Negative for palpitations and leg swelling  Gastrointestinal: Negative  Endocrine: Negative  Genitourinary: Negative  Musculoskeletal: Negative  Skin: Negative  Allergic/Immunologic: Negative  Neurological: Negative  Hematological: Negative  Psychiatric/Behavioral: The patient is nervous/anxious  Data to review:       Objective:    Vitals:    03/18/19 1609   BP: 140/74   BP Location: Left arm   Patient Position: Sitting   Cuff Size: Large   Pulse: 100   Weight: 96 6 kg (213 lb)   Height: 5' 3" (1 6 m)        Physical Exam   Constitutional: She is oriented to person, place, and time  She appears well-developed and well-nourished  HENT:   Head: Normocephalic     Right Ear: External ear normal    Left Ear: External ear normal    Mouth/Throat: Oropharynx is clear and moist    Eyes: Pupils are equal, round, and reactive to light  Conjunctivae and EOM are normal    Neck: Normal range of motion  Neck supple  Cardiovascular: Normal rate, regular rhythm and normal heart sounds  Pulmonary/Chest: Effort normal and breath sounds normal    Abdominal: Soft  Bowel sounds are normal    Musculoskeletal: Normal range of motion  Neurological: She is alert and oriented to person, place, and time  Skin: Skin is warm  Psychiatric: She has a normal mood and affect  Her behavior is normal  Judgment and thought content normal    Nursing note and vitals reviewed  BMI Counseling: Body mass index is 37 73 kg/m²  Discussed the patient's BMI with her  The BMI is above average  BMI counseling and education was provided to the patient  Nutrition recommendations include reducing portion sizes, decreasing overall calorie intake, 3-5 servings of fruits/vegetables daily, reducing fast food intake, consuming healthier snacks, decreasing soda and/or juice intake, moderation in carbohydrate intake and reducing intake of cholesterol  Exercise recommendations include moderate aerobic physical activity for 150 minutes/week

## 2019-03-29 ENCOUNTER — APPOINTMENT (OUTPATIENT)
Dept: LAB | Facility: HOSPITAL | Age: 44
End: 2019-03-29
Payer: COMMERCIAL

## 2019-03-29 DIAGNOSIS — E78.2 MIXED HYPERLIPIDEMIA: ICD-10-CM

## 2019-03-29 LAB
ALBUMIN SERPL BCP-MCNC: 3.8 G/DL (ref 3.5–5)
ALP SERPL-CCNC: 68 U/L (ref 46–116)
ALT SERPL W P-5'-P-CCNC: 42 U/L (ref 12–78)
ANION GAP SERPL CALCULATED.3IONS-SCNC: 7 MMOL/L (ref 4–13)
AST SERPL W P-5'-P-CCNC: 36 U/L (ref 5–45)
BILIRUB SERPL-MCNC: 0.75 MG/DL (ref 0.2–1)
BUN SERPL-MCNC: 9 MG/DL (ref 5–25)
CALCIUM SERPL-MCNC: 8.9 MG/DL (ref 8.3–10.1)
CHLORIDE SERPL-SCNC: 104 MMOL/L (ref 100–108)
CHOLEST SERPL-MCNC: 184 MG/DL (ref 50–200)
CO2 SERPL-SCNC: 29 MMOL/L (ref 21–32)
CREAT SERPL-MCNC: 0.91 MG/DL (ref 0.6–1.3)
GFR SERPL CREATININE-BSD FRML MDRD: 78 ML/MIN/1.73SQ M
GLUCOSE P FAST SERPL-MCNC: 97 MG/DL (ref 65–99)
HDLC SERPL-MCNC: 38 MG/DL (ref 40–60)
LDLC SERPL CALC-MCNC: 122 MG/DL (ref 0–100)
POTASSIUM SERPL-SCNC: 4.1 MMOL/L (ref 3.5–5.3)
PROT SERPL-MCNC: 7.5 G/DL (ref 6.4–8.2)
SODIUM SERPL-SCNC: 140 MMOL/L (ref 136–145)
TRIGL SERPL-MCNC: 120 MG/DL

## 2019-03-29 PROCEDURE — 36415 COLL VENOUS BLD VENIPUNCTURE: CPT

## 2019-03-29 PROCEDURE — 80053 COMPREHEN METABOLIC PANEL: CPT

## 2019-03-29 PROCEDURE — 80061 LIPID PANEL: CPT

## 2019-04-01 ENCOUNTER — TRANSCRIBE ORDERS (OUTPATIENT)
Dept: SLEEP CENTER | Facility: CLINIC | Age: 44
End: 2019-04-01

## 2019-04-01 DIAGNOSIS — E66.9 OBESITY: Primary | ICD-10-CM

## 2019-04-01 DIAGNOSIS — R63.5 ABNORMAL WEIGHT GAIN: ICD-10-CM

## 2019-04-01 DIAGNOSIS — G47.9 SLEEP DISORDER: ICD-10-CM

## 2019-04-02 ENCOUNTER — HOSPITAL ENCOUNTER (OUTPATIENT)
Dept: SLEEP CENTER | Facility: CLINIC | Age: 44
Discharge: HOME | End: 2019-04-02
Payer: COMMERCIAL

## 2019-04-02 ENCOUNTER — OFFICE VISIT (OUTPATIENT)
Dept: SLEEP CENTER | Facility: CLINIC | Age: 44
End: 2019-04-02
Payer: COMMERCIAL

## 2019-04-02 VITALS
HEART RATE: 90 BPM | BODY MASS INDEX: 40.84 KG/M2 | WEIGHT: 208 LBS | DIASTOLIC BLOOD PRESSURE: 76 MMHG | HEIGHT: 60 IN | SYSTOLIC BLOOD PRESSURE: 128 MMHG

## 2019-04-02 DIAGNOSIS — G47.9 SLEEP DISORDER: ICD-10-CM

## 2019-04-02 DIAGNOSIS — R63.5 ABNORMAL WEIGHT GAIN: ICD-10-CM

## 2019-04-02 DIAGNOSIS — E66.9 OBESITY: ICD-10-CM

## 2019-04-02 DIAGNOSIS — F41.9 ANXIETY: Primary | ICD-10-CM

## 2019-04-02 PROCEDURE — 95810 POLYSOM 6/> YRS 4/> PARAM: CPT

## 2019-04-02 PROCEDURE — 99244 OFF/OP CNSLTJ NEW/EST MOD 40: CPT | Performed by: INTERNAL MEDICINE

## 2019-04-02 RX ORDER — FAMOTIDINE 40 MG/1
40 TABLET, FILM COATED ORAL DAILY
Qty: 30 TABLET | Refills: 1 | Status: SHIPPED | OUTPATIENT
Start: 2019-04-02 | End: 2019-05-30 | Stop reason: SDUPTHER

## 2019-04-04 ENCOUNTER — TELEPHONE (OUTPATIENT)
Dept: SLEEP CENTER | Facility: CLINIC | Age: 44
End: 2019-04-04

## 2019-04-09 ENCOUNTER — OFFICE VISIT (OUTPATIENT)
Dept: ENDOCRINOLOGY | Facility: CLINIC | Age: 44
End: 2019-04-09
Payer: COMMERCIAL

## 2019-04-09 VITALS
HEART RATE: 82 BPM | DIASTOLIC BLOOD PRESSURE: 70 MMHG | WEIGHT: 209.6 LBS | HEIGHT: 60 IN | BODY MASS INDEX: 41.15 KG/M2 | SYSTOLIC BLOOD PRESSURE: 122 MMHG

## 2019-04-09 DIAGNOSIS — E55.9 VITAMIN D DEFICIENCY: ICD-10-CM

## 2019-04-09 DIAGNOSIS — E66.9 OBESITY (BMI 35.0-39.9 WITHOUT COMORBIDITY): Primary | ICD-10-CM

## 2019-04-09 DIAGNOSIS — N92.6 IRREGULAR MENSTRUAL CYCLE: ICD-10-CM

## 2019-04-09 DIAGNOSIS — L68.0 HIRSUTISM: ICD-10-CM

## 2019-04-09 PROCEDURE — 99214 OFFICE O/P EST MOD 30 MIN: CPT | Performed by: PHYSICIAN ASSISTANT

## 2019-04-19 ENCOUNTER — OFFICE VISIT (OUTPATIENT)
Dept: FAMILY MEDICINE CLINIC | Facility: CLINIC | Age: 44
End: 2019-04-19
Payer: COMMERCIAL

## 2019-04-19 VITALS
HEART RATE: 60 BPM | HEIGHT: 60 IN | SYSTOLIC BLOOD PRESSURE: 116 MMHG | WEIGHT: 204 LBS | BODY MASS INDEX: 40.05 KG/M2 | DIASTOLIC BLOOD PRESSURE: 78 MMHG

## 2019-04-19 DIAGNOSIS — E66.9 OBESITY (BMI 35.0-39.9 WITHOUT COMORBIDITY): ICD-10-CM

## 2019-04-19 DIAGNOSIS — E78.2 MIXED HYPERLIPIDEMIA: ICD-10-CM

## 2019-04-19 DIAGNOSIS — E55.9 VITAMIN D DEFICIENCY: ICD-10-CM

## 2019-04-19 DIAGNOSIS — K21.9 GASTROESOPHAGEAL REFLUX DISEASE WITHOUT ESOPHAGITIS: ICD-10-CM

## 2019-04-19 DIAGNOSIS — F41.9 ANXIETY: Primary | ICD-10-CM

## 2019-04-19 PROCEDURE — 3008F BODY MASS INDEX DOCD: CPT | Performed by: FAMILY MEDICINE

## 2019-04-19 PROCEDURE — 99214 OFFICE O/P EST MOD 30 MIN: CPT | Performed by: FAMILY MEDICINE

## 2019-04-21 PROBLEM — E78.5 DYSLIPIDEMIA: Status: ACTIVE | Noted: 2019-01-18

## 2019-04-22 ENCOUNTER — CONSULT (OUTPATIENT)
Dept: CARDIOLOGY CLINIC | Facility: CLINIC | Age: 44
End: 2019-04-22
Payer: COMMERCIAL

## 2019-04-22 VITALS
WEIGHT: 204.4 LBS | HEART RATE: 88 BPM | BODY MASS INDEX: 40.13 KG/M2 | HEIGHT: 60 IN | DIASTOLIC BLOOD PRESSURE: 76 MMHG | SYSTOLIC BLOOD PRESSURE: 136 MMHG

## 2019-04-22 DIAGNOSIS — E78.5 DYSLIPIDEMIA: ICD-10-CM

## 2019-04-22 DIAGNOSIS — E66.9 OBESITY (BMI 35.0-39.9 WITHOUT COMORBIDITY): ICD-10-CM

## 2019-04-22 DIAGNOSIS — R00.2 PALPITATIONS: ICD-10-CM

## 2019-04-22 PROCEDURE — 99244 OFF/OP CNSLTJ NEW/EST MOD 40: CPT | Performed by: INTERNAL MEDICINE

## 2019-04-24 ENCOUNTER — HOSPITAL ENCOUNTER (OUTPATIENT)
Dept: NON INVASIVE DIAGNOSTICS | Facility: HOSPITAL | Age: 44
Discharge: HOME | End: 2019-04-24
Attending: INTERNAL MEDICINE
Payer: COMMERCIAL

## 2019-04-24 DIAGNOSIS — R00.2 PALPITATIONS: ICD-10-CM

## 2019-04-24 PROCEDURE — 93306 TTE W/DOPPLER COMPLETE: CPT

## 2019-04-24 PROCEDURE — 93306 TTE W/DOPPLER COMPLETE: CPT | Performed by: INTERNAL MEDICINE

## 2019-05-30 DIAGNOSIS — G47.9 SLEEP DISORDER: ICD-10-CM

## 2019-05-30 RX ORDER — FAMOTIDINE 40 MG/1
40 TABLET, FILM COATED ORAL DAILY
Qty: 30 TABLET | Refills: 3 | Status: SHIPPED | OUTPATIENT
Start: 2019-05-30 | End: 2019-10-08 | Stop reason: SDUPTHER

## 2019-06-30 NOTE — PROGRESS NOTES
Cardiology Follow-up    Christiane Hernandez  [id number]  1975  [place] 35      1  Palpitations     2  Dyslipidemia     3  Obesity (BMI 35 0-39 9 without comorbidity)         Discussion/Summary:  Mrs Marylene Castleman is a very pleasant 44-year-old female who presents to the office today for the re-evaluation of palpitations and dyslipidemia  Regarding her palpitations, she had two episodes since her last visit  They seem to be occurring around menstruation and may be hormonally mediated  However her periods have been erratic  I think monitoring would be low yield at this point given the unpredictable frequency of her symptoms  I have asked that she call the office should these continue to occur so that an event monitor can be pursued  Her sleep study did not reveal any evidence of sleep apnea  Her echocardiogram revealed a structurally normal heart  Otherwise regarding her lipids, she has had marked improvement from January until her most recent set in March  She continues to exercise and lose weight  Based on her 10 year risk no therapy is indicated  She should continue with therapeutic lifestyle modifications as she is doing and she will have these reassessed in the near future  No specific follow-up will be arranged  However she will notify the office of recurrent palpitations  History of Present Illness:  Mrs Marylene Castleman is a very pleasant 44-year-old female who presents to the office today for the re-evaluation of palpitations and dyslipidemia  Since her last visit she had two episodes of palpitations  Both of these occurred right before she began to menstruate although her periods are somewhat erratic  One woke her from sleep and one occurred while she was awake  She notes a sudden onset of a rapid heartbeat    Both of these episodes lasted less than five minutes and resolved spontaneously and gradually without any specific intervention  She continues to exercise on regular basis  She has lost another 10 lb since her last visit  She denies any cardiopulmonary symptoms of chest pain or shortness of breath with exercise  She denies any signs or symptoms of congestive heart failure including increasing lower extremity edema, paroxysmal nocturnal dyspnea, orthopnea, acute weight gain or increasing abdominal girth  She denies lightheadedness, syncope or presyncope  She denies symptoms of claudication      Patient Active Problem List   Diagnosis    Dietary calcium deficiency    Palpitations    Vertigo    Breast cancer screening    Excessive weight gain    Anxiety    Dyslipidemia    Obesity (BMI 35 0-39 9 without comorbidity)    Abnormal weight gain    Irregular menstrual cycle    Sleep disturbances    Hirsutism    Myalgia    Chest pain    Periodic limb movements of sleep    Vitamin D deficiency    Gastroesophageal reflux disease without esophagitis     Past Medical History:   Diagnosis Date    Breast nodule 2009    Fibroglandular changes    Migraine with aura and without status migrainosus, not intractable     Peptic ulcer      Social History     Socioeconomic History    Marital status: /Civil Union     Spouse name: Not on file    Number of children: Not on file    Years of education: Not on file    Highest education level: Not on file   Occupational History    Occupation: Physical Therapist   Social Needs    Financial resource strain: Not on file    Food insecurity:     Worry: Not on file     Inability: Not on file    Transportation needs:     Medical: Not on file     Non-medical: Not on file   Tobacco Use    Smoking status: Never Smoker    Smokeless tobacco: Never Used   Substance and Sexual Activity    Alcohol use: Not Currently    Drug use: No    Sexual activity: Not on file   Lifestyle    Physical activity:     Days per week: Not on file Minutes per session: Not on file    Stress: Not on file   Relationships    Social connections:     Talks on phone: Not on file     Gets together: Not on file     Attends Rastafari service: Not on file     Active member of club or organization: Not on file     Attends meetings of clubs or organizations: Not on file     Relationship status: Not on file    Intimate partner violence:     Fear of current or ex partner: Not on file     Emotionally abused: Not on file     Physically abused: Not on file     Forced sexual activity: Not on file   Other Topics Concern    Not on file   Social History Narrative    Education- 2 year college      Family History   Problem Relation Age of Onset    Rheum arthritis Mother     Hypertension Mother     Hyperlipidemia Mother     Hepatitis Father         B    Hyperlipidemia Father     Hypertension Father     Lung cancer Maternal Grandmother         over [de-identified]    Rheum arthritis Maternal Grandmother     Kidney disease Maternal Grandfather     Anxiety disorder Paternal Grandmother     Depression Paternal Grandmother     Bipolar disorder Paternal Grandmother     Lung cancer Paternal Grandmother         over [de-identified]    Breast cancer Maternal Aunt 29    Nephrolithiasis Family     Anxiety disorder Sister     Depression Sister     Kidney disease Paternal Grandfather     Heart disease Paternal Aunt      Past Surgical History:   Procedure Laterality Date    ORTHODONTIC TREATMENT      16 yo; incisors pulled down    WISDOM TOOTH EXTRACTION         Current Outpatient Medications:     Ashwagandha 500 MG CAPS, Take by mouth, Disp: , Rfl:     b complex vitamins capsule, Take 1 capsule by mouth daily, Disp: , Rfl:     Cholecalciferol (VITAMIN D3) 2000 units TABS, Take 2,000 Units by mouth daily, Disp: , Rfl:     famotidine (PEPCID) 40 MG tablet, Take 1 tablet (40 mg total) by mouth daily (At bedtime), Disp: 30 tablet, Rfl: 3    Garlic 0658 MG CAPS, Take by mouth, Disp: , Rfl:    multivitamin (THERAGRAN) TABS, Take 1 tablet by mouth daily, Disp: , Rfl:     PARoxetine (PAXIL) 10 mg tablet, 1/2 tablet daily, Disp: 30 tablet, Rfl: 3    Zinc 50 MG TABS, Take by mouth, Disp: , Rfl:   Allergies   Allergen Reactions    Lipitor [Atorvastatin] Myalgia         Labs:  No visits with results within 2 Month(s) from this visit  Latest known visit with results is:   Appointment on 03/29/2019   Component Date Value    Cholesterol 03/29/2019 184     Triglycerides 03/29/2019 120     HDL, Direct 03/29/2019 38*    LDL Calculated 03/29/2019 122*    Sodium 03/29/2019 140     Potassium 03/29/2019 4 1     Chloride 03/29/2019 104     CO2 03/29/2019 29     ANION GAP 03/29/2019 7     BUN 03/29/2019 9     Creatinine 03/29/2019 0 91     Glucose, Fasting 03/29/2019 97     Calcium 03/29/2019 8 9     AST 03/29/2019 36     ALT 03/29/2019 42     Alkaline Phosphatase 03/29/2019 68     Total Protein 03/29/2019 7 5     Albumin 03/29/2019 3 8     Total Bilirubin 03/29/2019 0 75     eGFR 03/29/2019 78         Imaging: No results found  Review of Systems:  Review of Systems   Respiratory: Negative for chest tightness and shortness of breath  Cardiovascular: Positive for palpitations  All other systems reviewed and are negative          Vitals:    07/01/19 1034   BP: 115/68   BP Location: Left arm   Patient Position: Sitting   Cuff Size: Large   Pulse: 75   Weight: 88 2 kg (194 lb 6 4 oz)     Vitals:    07/01/19 1034   Weight: 88 2 kg (194 lb 6 4 oz)           Physical Exam:  General appearance:  Appears stated age, alert, well appearing and in no distress  HEENT:  PERRLA, EOMI, no scleral icterus, no conjunctival pallor  NECK:  Supple, No elevated JVP, no thyromegaly, no carotid bruits  HEART:  Regular rate and rhythm, normal S1/S2, no S3/S4, no murmur or rub  LUNGS:  Clear to auscultation bilaterally  ABDOMEN:  Soft, non-tender, positive bowel sounds, no rebound or guarding, no organomegaly EXTREMITIES:  No edema  VASCULAR:  Normal pedal pulses   SKIN: No lesions or rashes on exposed skin  NEURO:  CN II-XII intact, no focal deficits

## 2019-07-01 ENCOUNTER — OFFICE VISIT (OUTPATIENT)
Dept: CARDIOLOGY CLINIC | Facility: CLINIC | Age: 44
End: 2019-07-01
Payer: COMMERCIAL

## 2019-07-01 VITALS
SYSTOLIC BLOOD PRESSURE: 115 MMHG | DIASTOLIC BLOOD PRESSURE: 68 MMHG | BODY MASS INDEX: 37.97 KG/M2 | HEART RATE: 75 BPM | WEIGHT: 194.4 LBS

## 2019-07-01 DIAGNOSIS — E78.5 DYSLIPIDEMIA: ICD-10-CM

## 2019-07-01 DIAGNOSIS — R00.2 PALPITATIONS: Primary | ICD-10-CM

## 2019-07-01 DIAGNOSIS — E66.9 OBESITY (BMI 35.0-39.9 WITHOUT COMORBIDITY): ICD-10-CM

## 2019-07-01 PROCEDURE — 99214 OFFICE O/P EST MOD 30 MIN: CPT | Performed by: INTERNAL MEDICINE

## 2019-10-08 DIAGNOSIS — F41.9 ANXIETY: ICD-10-CM

## 2019-10-08 DIAGNOSIS — G47.9 SLEEP DISORDER: ICD-10-CM

## 2019-10-08 RX ORDER — FAMOTIDINE 40 MG/1
40 TABLET, FILM COATED ORAL DAILY
Qty: 30 TABLET | Refills: 0 | Status: SHIPPED | OUTPATIENT
Start: 2019-10-08 | End: 2019-11-18 | Stop reason: SDUPTHER

## 2019-10-08 RX ORDER — PAROXETINE 10 MG/1
TABLET, FILM COATED ORAL
Qty: 30 TABLET | Refills: 0 | Status: SHIPPED | OUTPATIENT
Start: 2019-10-08 | End: 2019-11-05 | Stop reason: SDUPTHER

## 2019-11-01 ENCOUNTER — APPOINTMENT (OUTPATIENT)
Dept: LAB | Facility: HOSPITAL | Age: 44
End: 2019-11-01
Payer: COMMERCIAL

## 2019-11-01 DIAGNOSIS — F41.9 ANXIETY: ICD-10-CM

## 2019-11-01 DIAGNOSIS — E55.9 VITAMIN D DEFICIENCY: ICD-10-CM

## 2019-11-01 DIAGNOSIS — K21.9 GASTROESOPHAGEAL REFLUX DISEASE WITHOUT ESOPHAGITIS: ICD-10-CM

## 2019-11-01 DIAGNOSIS — E66.9 OBESITY (BMI 35.0-39.9 WITHOUT COMORBIDITY): ICD-10-CM

## 2019-11-01 LAB
25(OH)D3 SERPL-MCNC: 30.8 NG/ML (ref 30–100)
ALBUMIN SERPL BCP-MCNC: 3.7 G/DL (ref 3.5–5)
ALP SERPL-CCNC: 78 U/L (ref 46–116)
ALT SERPL W P-5'-P-CCNC: 28 U/L (ref 12–78)
ANION GAP SERPL CALCULATED.3IONS-SCNC: 6 MMOL/L (ref 4–13)
AST SERPL W P-5'-P-CCNC: 14 U/L (ref 5–45)
BILIRUB SERPL-MCNC: 0.69 MG/DL (ref 0.2–1)
BUN SERPL-MCNC: 12 MG/DL (ref 5–25)
CALCIUM SERPL-MCNC: 9.5 MG/DL (ref 8.3–10.1)
CHLORIDE SERPL-SCNC: 102 MMOL/L (ref 100–108)
CHOLEST SERPL-MCNC: 226 MG/DL (ref 50–200)
CO2 SERPL-SCNC: 30 MMOL/L (ref 21–32)
CREAT SERPL-MCNC: 0.81 MG/DL (ref 0.6–1.3)
GFR SERPL CREATININE-BSD FRML MDRD: 89 ML/MIN/1.73SQ M
GLUCOSE P FAST SERPL-MCNC: 95 MG/DL (ref 65–99)
HDLC SERPL-MCNC: 43 MG/DL
LDLC SERPL CALC-MCNC: 167 MG/DL (ref 0–100)
POTASSIUM SERPL-SCNC: 4.6 MMOL/L (ref 3.5–5.3)
PROT SERPL-MCNC: 7.7 G/DL (ref 6.4–8.2)
SODIUM SERPL-SCNC: 138 MMOL/L (ref 136–145)
TRIGL SERPL-MCNC: 82 MG/DL
TSH SERPL DL<=0.05 MIU/L-ACNC: 2.76 UIU/ML (ref 0.36–3.74)

## 2019-11-01 PROCEDURE — 84443 ASSAY THYROID STIM HORMONE: CPT

## 2019-11-01 PROCEDURE — 80061 LIPID PANEL: CPT

## 2019-11-01 PROCEDURE — 82306 VITAMIN D 25 HYDROXY: CPT

## 2019-11-01 PROCEDURE — 36415 COLL VENOUS BLD VENIPUNCTURE: CPT

## 2019-11-01 PROCEDURE — 80053 COMPREHEN METABOLIC PANEL: CPT

## 2019-11-05 ENCOUNTER — OFFICE VISIT (OUTPATIENT)
Dept: FAMILY MEDICINE CLINIC | Facility: CLINIC | Age: 44
End: 2019-11-05
Payer: COMMERCIAL

## 2019-11-05 VITALS
WEIGHT: 209 LBS | DIASTOLIC BLOOD PRESSURE: 76 MMHG | BODY MASS INDEX: 41.03 KG/M2 | HEART RATE: 72 BPM | HEIGHT: 60 IN | SYSTOLIC BLOOD PRESSURE: 120 MMHG

## 2019-11-05 DIAGNOSIS — K21.9 GASTROESOPHAGEAL REFLUX DISEASE WITHOUT ESOPHAGITIS: ICD-10-CM

## 2019-11-05 DIAGNOSIS — E78.5 DYSLIPIDEMIA: ICD-10-CM

## 2019-11-05 DIAGNOSIS — E55.9 VITAMIN D DEFICIENCY: ICD-10-CM

## 2019-11-05 DIAGNOSIS — F41.9 ANXIETY: Primary | ICD-10-CM

## 2019-11-05 PROCEDURE — 3008F BODY MASS INDEX DOCD: CPT | Performed by: FAMILY MEDICINE

## 2019-11-05 PROCEDURE — 99214 OFFICE O/P EST MOD 30 MIN: CPT | Performed by: FAMILY MEDICINE

## 2019-11-05 RX ORDER — PAROXETINE 10 MG/1
TABLET, FILM COATED ORAL
Qty: 30 TABLET | Refills: 3 | Status: SHIPPED | OUTPATIENT
Start: 2019-11-05 | End: 2020-03-18

## 2019-11-05 RX ORDER — ROSUVASTATIN CALCIUM 5 MG/1
5 TABLET, COATED ORAL DAILY
Qty: 90 TABLET | Refills: 3 | Status: SHIPPED | OUTPATIENT
Start: 2019-11-05 | End: 2021-02-16 | Stop reason: SDUPTHER

## 2019-11-05 NOTE — PROGRESS NOTES
Assessment and Plan:    Problem List Items Addressed This Visit        Digestive    Gastroesophageal reflux disease without esophagitis     Her reflux is stable on Pepcid 40 mg daily  Relevant Orders    Comprehensive metabolic panel    Lipid Panel with Direct LDL reflex       Other    Anxiety - Primary     She has had several panic attacks since starting Paxil 5 mg daily and she will be raised to 10 mg daily  Relevant Medications    PARoxetine (PAXIL) 10 mg tablet    Other Relevant Orders    Comprehensive metabolic panel    Lipid Panel with Direct LDL reflex    Dyslipidemia     She had previously been on Lipitor 10 mg daily and was unable to take the medication due to myalgias  Therefore, she has not been on any cholesterol medication and her cholesterol numbers are high with the total cholesterol going up from 184 to 226 and her LDL going up from 122 to 167  There is CAD in the family  Relevant Medications    rosuvastatin (CRESTOR) 5 mg tablet    Other Relevant Orders    Comprehensive metabolic panel    Lipid Panel with Direct LDL reflex    Vitamin D deficiency     Her last vitamin-D level was within normal limits  She is currently on vitamin D 3-2000 units daily  Relevant Orders    Comprehensive metabolic panel    Lipid Panel with Direct LDL reflex                 Diagnoses and all orders for this visit:    Anxiety  -     PARoxetine (PAXIL) 10 mg tablet; One tablet daily  -     Comprehensive metabolic panel; Future  -     Lipid Panel with Direct LDL reflex; Future    Dyslipidemia  -     rosuvastatin (CRESTOR) 5 mg tablet; Take 1 tablet (5 mg total) by mouth daily  -     Comprehensive metabolic panel; Future  -     Lipid Panel with Direct LDL reflex; Future    Vitamin D deficiency  -     Comprehensive metabolic panel; Future  -     Lipid Panel with Direct LDL reflex; Future    Gastroesophageal reflux disease without esophagitis  -     Comprehensive metabolic panel;  Future  - Lipid Panel with Direct LDL reflex; Future              Subjective:      Patient ID: Annita Borrero is a 38 y o  female  CC:    Chief Complaint   Patient presents with    Anxiety     pt also has blood work to review~cd       HPI:    This is a 27-year-old female who comes in for a recheck of her medications  She has had several panic attacks which have decreased since she started Paxil but she is only on 5 mg daily and she would like to increase it to 10 mg daily  Her blood pressure is stable at 120/76 but her weight has gone up significantly since the last visit to 209 lb  This has been a weight increase of 14 lb  Her BMI is 40 8  Reviewing her labs her glucose is stable at 95, cholesterol numbers are elevated with the total cholesterol going up from 184 to 226, triglycerides are good at 82 and her HDL is good at 43  Her LDL went up from 122 to 167  She was unable to take Lipitor because of myalgias and she will be started on Crestor 5 mg daily  The following portions of the patient's history were reviewed and updated as appropriate: allergies, current medications, past family history, past medical history, past social history, past surgical history and problem list       Review of Systems   Constitutional: Negative  HENT: Negative  Eyes: Negative  Respiratory: Negative  Cardiovascular: Negative  Gastrointestinal: Negative  Endocrine: Negative  Genitourinary: Negative  Musculoskeletal: Negative  Skin: Negative  Allergic/Immunologic: Negative  Neurological: Negative  Hematological: Negative  Psychiatric/Behavioral: Negative  Data to review:       Objective:    Vitals:    11/05/19 1201   BP: 120/76   BP Location: Left arm   Patient Position: Sitting   Cuff Size: Large   Pulse: 72   Weight: 94 8 kg (209 lb)   Height: 5' (1 524 m)        Physical Exam   Constitutional: She is oriented to person, place, and time   She appears well-developed and well-nourished  HENT:   Head: Normocephalic  Right Ear: External ear normal    Left Ear: External ear normal    Mouth/Throat: Oropharynx is clear and moist    Eyes: Pupils are equal, round, and reactive to light  Conjunctivae and EOM are normal    Neck: Normal range of motion  Neck supple  Cardiovascular: Normal rate, regular rhythm and normal heart sounds  Pulmonary/Chest: Effort normal and breath sounds normal    Abdominal: Soft  Bowel sounds are normal    Musculoskeletal: Normal range of motion  Neurological: She is alert and oriented to person, place, and time  Skin: Skin is warm  Psychiatric: She has a normal mood and affect  Her behavior is normal  Judgment and thought content normal    Nursing note and vitals reviewed  Body mass index is 40 82 kg/m²

## 2019-11-05 NOTE — ASSESSMENT & PLAN NOTE
Her last vitamin-D level was within normal limits  She is currently on vitamin D 3-2000 units daily

## 2019-11-05 NOTE — ASSESSMENT & PLAN NOTE
She has had several panic attacks since starting Paxil 5 mg daily and she will be raised to 10 mg daily

## 2019-11-05 NOTE — ASSESSMENT & PLAN NOTE
Patient:   NAE SALINAS            MRN: [id number]            FIN: [id number]              Age:   75 years     Sex:  FEMALE     :  46   Associated Diagnoses:   None   Author:   FAMILIA HERNANDEZ     74y/o c/o back pain, for some time.  \"Just been dealing with it\"  Lumbar ct scan spondylosis with stenosis evident.  Proceed with lumbar MRI, further recommendation pending   She had previously been on Lipitor 10 mg daily and was unable to take the medication due to myalgias  Therefore, she has not been on any cholesterol medication and her cholesterol numbers are high with the total cholesterol going up from 184 to 226 and her LDL going up from 122 to 167  There is CAD in the family

## 2019-11-18 DIAGNOSIS — G47.9 SLEEP DISORDER: ICD-10-CM

## 2019-11-18 RX ORDER — FAMOTIDINE 40 MG/1
40 TABLET, FILM COATED ORAL DAILY
Qty: 30 TABLET | Refills: 5 | Status: SHIPPED | OUTPATIENT
Start: 2019-11-18 | End: 2020-08-07 | Stop reason: SDUPTHER

## 2019-12-02 ENCOUNTER — OFFICE VISIT (OUTPATIENT)
Dept: FAMILY MEDICINE CLINIC | Facility: CLINIC | Age: 44
End: 2019-12-02
Payer: COMMERCIAL

## 2019-12-02 VITALS
BODY MASS INDEX: 40.72 KG/M2 | HEIGHT: 60 IN | DIASTOLIC BLOOD PRESSURE: 66 MMHG | WEIGHT: 207.4 LBS | SYSTOLIC BLOOD PRESSURE: 118 MMHG | HEART RATE: 76 BPM | TEMPERATURE: 98.6 F

## 2019-12-02 DIAGNOSIS — K64.9 BLEEDING HEMORRHOID: Primary | ICD-10-CM

## 2019-12-02 PROCEDURE — 3008F BODY MASS INDEX DOCD: CPT | Performed by: PHYSICIAN ASSISTANT

## 2019-12-02 PROCEDURE — 1036F TOBACCO NON-USER: CPT | Performed by: PHYSICIAN ASSISTANT

## 2019-12-02 PROCEDURE — 99214 OFFICE O/P EST MOD 30 MIN: CPT | Performed by: PHYSICIAN ASSISTANT

## 2019-12-02 NOTE — PROGRESS NOTES
Assessment and Plan:    Problem List Items Addressed This Visit        Digestive    Bleeding hemorrhoid - Primary     Sitz bath, colace 100 mg twice daily, increase fluids  Proctofoam as directed  Can always see colorectal in future  Relevant Medications    hydrocortisone-pramoxine (PROCTOFOAM-HC) rectal foam                 Diagnoses and all orders for this visit:    Bleeding hemorrhoid  -     hydrocortisone-pramoxine (PROCTOFOAM-HC) rectal foam; Insert 1 applicator into the rectum 2 (two) times a day              Subjective:      Patient ID: Debbie East is a 38 y o  female  CC:    Chief Complaint   Patient presents with    Rectal Bleeding     Pt states this has been ongoing on and off since first week of novemeber  Pt states she does have hemmorids, but states this has gotten worse since thanksgiving   Abdominal Cramping    Back Pain       HPI:     Patient here today because she has had intermittent hemorrhoids since her son was  Born 12 years ago as she needed forceps to remove him  Her 2nd son who is now 8 had a much faster delivery and less complicated  Patient states that lately with her bleeding external hemorrhoid she is having lot of pain with bowel movements bleeding in bowl and on tissue painful bowel movements without help from preparation H or wipes  No itching but a lot of burning  She has been riding her bike more trying to lose weight which also exacerbates her hemorrhoid situation  Menstrual cycle was last weekend she had a lot of cramping which normal for her menstrual cycle but then the cramping and low back discomfort has continued since then and she feels that it might be related to her hemorrhoids  She also feels like there is a tearing every time she has a bowel movement  No history of family colon polyps or colon cancer        The following portions of the patient's history were reviewed and updated as appropriate: allergies, current medications, past family history, past medical history, past social history, past surgical history and problem list       Review of Systems   Constitutional: Negative  HENT: Negative  Eyes: Negative  Respiratory: Negative  Cardiovascular: Negative  Gastrointestinal: Positive for blood in stool and rectal pain  Endocrine: Negative  Genitourinary: Negative  Musculoskeletal: Negative  Skin: Negative  Allergic/Immunologic: Negative  Neurological: Negative  Hematological: Negative  Psychiatric/Behavioral: Negative  Data to review:       Objective:    Vitals:    12/02/19 1236   BP: 118/66   BP Location: Left arm   Patient Position: Sitting   Cuff Size: Adult   Pulse: 76   Temp: 98 6 °F (37 °C)   TempSrc: Tympanic   Weight: 94 1 kg (207 lb 6 4 oz)   Height: 5' (1 524 m)        Physical Exam   Constitutional: She is oriented to person, place, and time  She appears well-developed and well-nourished  No distress  HENT:   Head: Normocephalic and atraumatic  Eyes: Conjunctivae are normal  Right eye exhibits no discharge  Left eye exhibits no discharge  Neck: Neck supple  Carotid bruit is not present  Cardiovascular: Normal rate  Pulmonary/Chest: Effort normal  No respiratory distress  Genitourinary:   Genitourinary Comments:   Patient with a well pronounced external hemorrhoid at the 9 o'clock position in the left lateral lying position  It is at least 1 cm erythematous and edematous  No bleeding currently noted no thrombosis  Proctoscope examination not done  Neurological: She is alert and oriented to person, place, and time  Skin: Skin is warm and dry  She is not diaphoretic  Psychiatric: She has a normal mood and affect  Judgment normal    Nursing note and vitals reviewed

## 2019-12-02 NOTE — ASSESSMENT & PLAN NOTE
Sitz bath, colace 100 mg twice daily, increase fluids  Proctofoam as directed  Can always see colorectal in future

## 2019-12-02 NOTE — PATIENT INSTRUCTIONS
Problem List Items Addressed This Visit        Digestive    Bleeding hemorrhoid - Primary     Sitz bath, colace 100 mg twice daily, increase fluids  Proctofoam as directed  Can always see colorectal in future  Relevant Medications    hydrocortisone-pramoxine (PROCTOFOAM-HC) rectal foam        Hemorrhoids   AMBULATORY CARE:   Hemorrhoids  are swollen blood vessels inside your rectum (internal hemorrhoids) or on your anus (external hemorrhoids)  Sometimes a hemorrhoid may prolapse  This means it extends out of your anus  Common symptoms include the following:   · Pain or itching around your anus or inside your rectum    · Swelling or bumps around your anus    · Bright red blood in your bowel movement, on the toilet paper, or in the toilet bowl    · Tissue bulging out of your anus (prolapsed hemorrhoids)    · Incontinence (poor control over urine or bowel movements)  Seek care immediately if:   · You have severe pain in your rectum or around your anus  · You have severe pain in your abdomen and you are vomiting  · You have bleeding from your anus that soaks through your underwear  Contact your healthcare provider if:   · You have frequent and painful bowel movements  · Your hemorrhoid looks or feels more swollen than usual      · You do not have a bowel movement for 2 days or more  · You see or feel tissue coming through your anus  · You have questions or concerns about your condition or care  Treatment for hemorrhoids  may include medicines to decrease pain, swelling, and itching  The medicine may be a pill, pad, cream, or ointment  Medicine may also be given to soften your bowel movement  Surgery and other procedures may be needed to shrink or remove your hemorrhoids  Manage your symptoms:   · Apply ice on your anus for 15 to 20 minutes every hour or as directed  Use an ice pack, or put crushed ice in a plastic bag  Cover it with a towel before you apply it to your anus   Ice helps prevent tissue damage and decreases swelling and pain  · Take a sitz bath  Fill a bathtub with 4 to 6 inches of warm water  You may also use a sitz bath pan that fits inside a toilet bowl  Sit in the sitz bath for 15 minutes  Do this 3 times a day, and after each bowel movement  The warm water can help decrease pain and swelling  · Keep your anal area clean  Gently wash the area with warm water daily  Soap may irritate the area  After a bowel movement, wipe with moist towelettes or wet toilet paper  Dry toilet paper can irritate the area  Prevent hemorrhoids:   · Do not strain to have a bowel movement  Do not sit on the toilet too long  These actions can increase pressure on the tissues in your rectum and anus  · Drink plenty of liquids  Liquids can help prevent constipation  Ask how much liquid to drink each day and which liquids are best for you  · Eat a variety of high-fiber foods  Examples include fruits, vegetables, and whole grains  Ask your healthcare provider how much fiber you need each day  You may need to take a fiber supplement  · Exercise as directed  Exercise, such as walking, may make it easier to have a bowel movement  Ask your healthcare provider to help you create an exercise plan  · Do not have anal sex  Anal sex can weaken the skin around your rectum and anus  · Avoid heavy lifting  This can cause straining and increase your risk for another hemorrhoid  Follow up with your healthcare provider as directed:  Write down your questions so you remember to ask them during your visits  © 2017 [employer]  Information is for End User's use only and may not be sold, redistributed or otherwise used for commercial purposes  All illustrations and images included in CareNotes® are the copyrighted property of [employer] , [employer]  or [employer]  The above information is an  only   It is not intended as medical advice for individual conditions or treatments  Talk to your doctor, nurse or pharmacist before following any medical regimen to see if it is safe and effective for you

## 2020-01-08 ENCOUNTER — TELEPHONE (OUTPATIENT)
Dept: FAMILY MEDICINE CLINIC | Facility: CLINIC | Age: 45
End: 2020-01-08

## 2020-01-08 NOTE — TELEPHONE ENCOUNTER
[facility] called stating that Famotidine 40 mg is not available  Can patient take/can you order (2) 20mg for her instead?  Thanks  Camila

## 2020-02-20 ENCOUNTER — OFFICE VISIT (OUTPATIENT)
Dept: FAMILY MEDICINE CLINIC | Facility: CLINIC | Age: 45
End: 2020-02-20
Payer: COMMERCIAL

## 2020-02-20 VITALS
DIASTOLIC BLOOD PRESSURE: 76 MMHG | HEIGHT: 60 IN | TEMPERATURE: 98.2 F | HEART RATE: 89 BPM | SYSTOLIC BLOOD PRESSURE: 116 MMHG | BODY MASS INDEX: 41.82 KG/M2 | WEIGHT: 213 LBS

## 2020-02-20 DIAGNOSIS — K64.9 BLEEDING HEMORRHOID: ICD-10-CM

## 2020-02-20 DIAGNOSIS — J01.90 ACUTE NON-RECURRENT SINUSITIS, UNSPECIFIED LOCATION: Primary | ICD-10-CM

## 2020-02-20 DIAGNOSIS — Z12.39 BREAST CANCER SCREENING: ICD-10-CM

## 2020-02-20 PROCEDURE — 99213 OFFICE O/P EST LOW 20 MIN: CPT | Performed by: FAMILY MEDICINE

## 2020-02-20 PROCEDURE — 3008F BODY MASS INDEX DOCD: CPT | Performed by: FAMILY MEDICINE

## 2020-02-20 PROCEDURE — 1036F TOBACCO NON-USER: CPT | Performed by: FAMILY MEDICINE

## 2020-02-20 RX ORDER — AZITHROMYCIN 500 MG/1
500 TABLET, FILM COATED ORAL DAILY
Qty: 3 TABLET | Refills: 0 | Status: SHIPPED | OUTPATIENT
Start: 2020-02-20 | End: 2020-02-23

## 2020-02-20 NOTE — ASSESSMENT & PLAN NOTE
The patient was given Zithromax 500 mg 1 tablet daily for the next 3 days  She was also instructed to use Coricidin HBP/cold and flu 1 tablet twice a day and 2 at bedtime and Delsym 2 tsp twice a day for her cough

## 2020-02-20 NOTE — ASSESSMENT & PLAN NOTE
Patient was placed on Anusol HC to apply externally twice a day until she sees Dr Jagdeep Cedillo for a colorectal consult  She would like something done about the hemorrhoids

## 2020-02-20 NOTE — PROGRESS NOTES
Assessment and Plan:  Follow up if not better  She will get her lab work done and make an appointment for her regular 3 month visit after the lab work  Problem List Items Addressed This Visit        Digestive    Bleeding hemorrhoid     Patient was placed on Anusol HC to apply externally twice a day until she sees Dr Arvin Cano for a colorectal consult  She would like something done about the hemorrhoids  Relevant Medications    hydrocortisone (ANUSOL-HC, PROCTOSOL HC,) 2 5 % rectal cream    Other Relevant Orders    Ambulatory referral to Colorectal Surgery       Respiratory    Acute non-recurrent sinusitis - Primary     The patient was given Zithromax 500 mg 1 tablet daily for the next 3 days  She was also instructed to use Coricidin HBP/cold and flu 1 tablet twice a day and 2 at bedtime and Delsym 2 tsp twice a day for her cough  Relevant Medications    azithromycin (ZITHROMAX) 500 MG tablet       Other    Breast cancer screening     A mammogram referral was given to her         Relevant Orders    Mammo screening bilateral w 3d & cad                 Diagnoses and all orders for this visit:    Acute non-recurrent sinusitis, unspecified location  -     azithromycin (ZITHROMAX) 500 MG tablet; Take 1 tablet (500 mg total) by mouth daily for 3 days    Breast cancer screening  -     Mammo screening bilateral w 3d & cad; Future    Bleeding hemorrhoid  -     hydrocortisone (ANUSOL-HC, PROCTOSOL HC,) 2 5 % rectal cream; Insert into the rectum 2 (two) times a day  -     Ambulatory referral to Colorectal Surgery; Future              Subjective:      Patient ID: Aly Munguia is a 38 y o  female  CC:    Chief Complaint   Patient presents with    Cold Like Symptoms     Dry cough, fatigue, loss of voice  Symptoms started last week   Hemorrhoids     Pt was treated for bleeding hemorrhoids 2 months ago  Bleeding has stopped, but she is still having a lot of pain    -  lsh       HPI:    This is a 71-year-old female who comes in with symptoms of a dry cough, fatigue and loss of voice for the past week  She denies any nausea, vomiting or diarrhea  She has not used any medication for the symptoms  Her blood pressure is 116/76 and her weight is 213 lb up 6 lb from the previous visit and her BMI is 41 6  Her temperature is 98 2°  She did not get her labs for this visit and will do so and then make an appointment for her regular 3 month visit  The following portions of the patient's history were reviewed and updated as appropriate: allergies, current medications, past family history, past medical history, past social history, past surgical history and problem list       Review of Systems   Constitutional: Positive for fatigue  Negative for chills and fever  HENT: Positive for congestion, postnasal drip and sinus pressure  Negative for ear pain and sore throat  Eyes: Negative  Respiratory: Positive for cough  Negative for chest tightness, shortness of breath and wheezing  Cardiovascular: Negative  Negative for chest pain  Gastrointestinal: Negative  Negative for diarrhea, nausea and vomiting  Endocrine: Negative  Genitourinary: Negative  Musculoskeletal: Negative  Skin: Negative  Allergic/Immunologic: Negative  Neurological: Negative  Hematological: Negative  Psychiatric/Behavioral: Negative  Data to review:       Objective:    Vitals:    02/20/20 1428   BP: 116/76   BP Location: Left arm   Patient Position: Sitting   Cuff Size: Large   Pulse: 89   Temp: 98 2 °F (36 8 °C)   TempSrc: Oral   Weight: 96 6 kg (213 lb)   Height: 5' (1 524 m)        Physical Exam   Constitutional: She is oriented to person, place, and time  She appears well-developed and well-nourished  HENT:   Head: Normocephalic  Right Ear: External ear normal    Left Ear: External ear normal    Mouth/Throat: No oropharyngeal exudate     Positive postnasal drip, +1 erythema of posterior pharynx without exudates  Eyes: Pupils are equal, round, and reactive to light  Conjunctivae and EOM are normal    Neck: Normal range of motion  Neck supple  Cardiovascular: Normal rate, regular rhythm and normal heart sounds  Pulmonary/Chest: Effort normal and breath sounds normal  She has no wheezes  She has no rales  Abdominal: Soft  Bowel sounds are normal    Musculoskeletal: Normal range of motion  Lymphadenopathy:     She has no cervical adenopathy  Neurological: She is alert and oriented to person, place, and time  Skin: Skin is warm  Psychiatric: She has a normal mood and affect  Her behavior is normal  Judgment and thought content normal    Nursing note and vitals reviewed  BMI Counseling: Body mass index is 41 6 kg/m²  The BMI is above normal  Nutrition recommendations include decreasing portion sizes, encouraging healthy choices of fruits and vegetables, decreasing fast food intake, consuming healthier snacks, limiting drinks that contain sugar, moderation in carbohydrate intake, increasing intake of lean protein, reducing intake of saturated and trans fat and reducing intake of cholesterol  Exercise recommendations include exercising 3-5 times per week  No pharmacotherapy was ordered

## 2020-03-18 DIAGNOSIS — F41.9 ANXIETY: ICD-10-CM

## 2020-03-18 RX ORDER — PAROXETINE 10 MG/1
TABLET, FILM COATED ORAL
Qty: 30 TABLET | Refills: 0 | Status: SHIPPED | OUTPATIENT
Start: 2020-03-18 | End: 2020-04-14 | Stop reason: SDUPTHER

## 2020-03-18 NOTE — PRE-PROCEDURE INSTRUCTIONS
Pre-Surgery Instructions:   Medication Instructions    PARoxetine (PAXIL) 10 mg tablet Instructed patient per Anesthesia Guidelines   rosuvastatin (CRESTOR) 5 mg tablet Instructed patient per Anesthesia Guidelines

## 2020-03-19 ENCOUNTER — ANESTHESIA EVENT (OUTPATIENT)
Dept: PERIOP | Facility: HOSPITAL | Age: 45
End: 2020-03-19
Payer: COMMERCIAL

## 2020-03-19 NOTE — ANESTHESIA PREPROCEDURE EVALUATION
Review of Systems/Medical History  Patient summary reviewed  Chart reviewed      Cardiovascular  Hyperlipidemia,    Pulmonary  Negative pulmonary ROS        GI/Hepatic    PUD, GERD well controlled,        Negative  ROS        Endo/Other    Obesity    GYN       Hematology  Negative hematology ROS      Musculoskeletal  Negative musculoskeletal ROS        Neurology    Headaches (migranes),    Psychology   Anxiety,              Physical Exam    Airway    Mallampati score: II  TM Distance: >3 FB  Neck ROM: full     Dental       Cardiovascular  Cardiovascular exam normal    Pulmonary  Pulmonary exam normal     Other Findings        Anesthesia Plan  ASA Score- 2     Anesthesia Type- IV sedation with anesthesia with ASA Monitors  Additional Monitors:   Airway Plan:         Plan Factors-    Induction-     Postoperative Plan-     Informed Consent- Anesthetic plan and risks discussed with patient  I personally reviewed this patient with the CRNA  Discussed and agreed on the Anesthesia Plan with the CRNA  Demian Wallace

## 2020-03-20 ENCOUNTER — ANESTHESIA (OUTPATIENT)
Dept: PERIOP | Facility: HOSPITAL | Age: 45
End: 2020-03-20
Payer: COMMERCIAL

## 2020-03-20 ENCOUNTER — HOSPITAL ENCOUNTER (OUTPATIENT)
Facility: HOSPITAL | Age: 45
Setting detail: OUTPATIENT SURGERY
Discharge: HOME | End: 2020-03-20
Attending: COLON & RECTAL SURGERY | Admitting: COLON & RECTAL SURGERY
Payer: COMMERCIAL

## 2020-03-20 VITALS
SYSTOLIC BLOOD PRESSURE: 121 MMHG | TEMPERATURE: 97.9 F | OXYGEN SATURATION: 99 % | DIASTOLIC BLOOD PRESSURE: 59 MMHG | HEART RATE: 69 BPM | RESPIRATION RATE: 18 BRPM

## 2020-03-20 DIAGNOSIS — K60.1 CHRONIC POSTERIOR ANAL FISSURE: Primary | ICD-10-CM

## 2020-03-20 LAB
EXT PREGNANCY TEST URINE: NEGATIVE
EXT. CONTROL: NORMAL

## 2020-03-20 PROCEDURE — 81025 URINE PREGNANCY TEST: CPT | Performed by: COLON & RECTAL SURGERY

## 2020-03-20 RX ORDER — TRAMADOL HYDROCHLORIDE 50 MG/1
50 TABLET ORAL EVERY 6 HOURS PRN
Qty: 30 TABLET | Refills: 0 | Status: SHIPPED | OUTPATIENT
Start: 2020-03-20 | End: 2020-03-30

## 2020-03-20 RX ORDER — PROPOFOL 10 MG/ML
INJECTION, EMULSION INTRAVENOUS CONTINUOUS PRN
Status: DISCONTINUED | OUTPATIENT
Start: 2020-03-20 | End: 2020-03-20 | Stop reason: SURG

## 2020-03-20 RX ORDER — MAGNESIUM HYDROXIDE 1200 MG/15ML
LIQUID ORAL AS NEEDED
Status: DISCONTINUED | OUTPATIENT
Start: 2020-03-20 | End: 2020-03-20 | Stop reason: HOSPADM

## 2020-03-20 RX ORDER — FENTANYL CITRATE 50 UG/ML
INJECTION, SOLUTION INTRAMUSCULAR; INTRAVENOUS AS NEEDED
Status: DISCONTINUED | OUTPATIENT
Start: 2020-03-20 | End: 2020-03-20 | Stop reason: SURG

## 2020-03-20 RX ORDER — KETOROLAC TROMETHAMINE 30 MG/ML
INJECTION, SOLUTION INTRAMUSCULAR; INTRAVENOUS AS NEEDED
Status: DISCONTINUED | OUTPATIENT
Start: 2020-03-20 | End: 2020-03-20 | Stop reason: SURG

## 2020-03-20 RX ORDER — LIDOCAINE HYDROCHLORIDE 10 MG/ML
INJECTION, SOLUTION EPIDURAL; INFILTRATION; INTRACAUDAL; PERINEURAL AS NEEDED
Status: DISCONTINUED | OUTPATIENT
Start: 2020-03-20 | End: 2020-03-20 | Stop reason: SURG

## 2020-03-20 RX ORDER — FENTANYL CITRATE/PF 50 MCG/ML
25 SYRINGE (ML) INJECTION
Status: DISCONTINUED | OUTPATIENT
Start: 2020-03-20 | End: 2020-03-20 | Stop reason: HOSPADM

## 2020-03-20 RX ORDER — DIPHENHYDRAMINE HYDROCHLORIDE 50 MG/ML
12.5 INJECTION INTRAMUSCULAR; INTRAVENOUS ONCE AS NEEDED
Status: DISCONTINUED | OUTPATIENT
Start: 2020-03-20 | End: 2020-03-20 | Stop reason: HOSPADM

## 2020-03-20 RX ORDER — HYDROMORPHONE HCL/PF 1 MG/ML
0.5 SYRINGE (ML) INJECTION
Status: DISCONTINUED | OUTPATIENT
Start: 2020-03-20 | End: 2020-03-20 | Stop reason: HOSPADM

## 2020-03-20 RX ORDER — OXYCODONE HYDROCHLORIDE AND ACETAMINOPHEN 5; 325 MG/1; MG/1
1 TABLET ORAL EVERY 4 HOURS PRN
Status: DISCONTINUED | OUTPATIENT
Start: 2020-03-20 | End: 2020-03-20 | Stop reason: HOSPADM

## 2020-03-20 RX ORDER — PROPOFOL 10 MG/ML
INJECTION, EMULSION INTRAVENOUS AS NEEDED
Status: DISCONTINUED | OUTPATIENT
Start: 2020-03-20 | End: 2020-03-20 | Stop reason: SURG

## 2020-03-20 RX ORDER — SODIUM CHLORIDE, SODIUM LACTATE, POTASSIUM CHLORIDE, CALCIUM CHLORIDE 600; 310; 30; 20 MG/100ML; MG/100ML; MG/100ML; MG/100ML
125 INJECTION, SOLUTION INTRAVENOUS CONTINUOUS
Status: DISCONTINUED | OUTPATIENT
Start: 2020-03-20 | End: 2020-03-20 | Stop reason: HOSPADM

## 2020-03-20 RX ORDER — MIDAZOLAM HYDROCHLORIDE 2 MG/2ML
INJECTION, SOLUTION INTRAMUSCULAR; INTRAVENOUS AS NEEDED
Status: DISCONTINUED | OUTPATIENT
Start: 2020-03-20 | End: 2020-03-20 | Stop reason: SURG

## 2020-03-20 RX ADMIN — MIDAZOLAM HYDROCHLORIDE 2 MG: 1 INJECTION, SOLUTION INTRAMUSCULAR; INTRAVENOUS at 10:12

## 2020-03-20 RX ADMIN — PROPOFOL 100 MCG/KG/MIN: 10 INJECTION, EMULSION INTRAVENOUS at 10:14

## 2020-03-20 RX ADMIN — FENTANYL CITRATE 50 MCG: 50 INJECTION INTRAMUSCULAR; INTRAVENOUS at 10:14

## 2020-03-20 RX ADMIN — LIDOCAINE HYDROCHLORIDE 50 MG: 10 INJECTION, SOLUTION EPIDURAL; INFILTRATION; INTRACAUDAL; PERINEURAL at 10:14

## 2020-03-20 RX ADMIN — PROPOFOL 30 MG: 10 INJECTION, EMULSION INTRAVENOUS at 10:14

## 2020-03-20 RX ADMIN — KETOROLAC TROMETHAMINE 30 MG: 30 INJECTION, SOLUTION INTRAMUSCULAR; INTRAVENOUS at 10:30

## 2020-03-20 RX ADMIN — FENTANYL CITRATE 50 MCG: 50 INJECTION INTRAMUSCULAR; INTRAVENOUS at 10:12

## 2020-03-20 RX ADMIN — SODIUM CHLORIDE, SODIUM LACTATE, POTASSIUM CHLORIDE, AND CALCIUM CHLORIDE: .6; .31; .03; .02 INJECTION, SOLUTION INTRAVENOUS at 09:58

## 2020-03-20 NOTE — DISCHARGE SUMMARY
[facility]                                                                                 DISCHARGE SUMMARY        PATIENT NAME: Kim Ambrose    :  1975  MRN: [id number]  Pt Location:  OR ROOM 08    DISCHARGE DATE: 3/20/2020    PROCEDURE: Procedure(s) (LRB):  LEFT PARTIAL SPHINCTEROTOMY; AND FISSURECTOMY (N/A)    Anesthesia Type: IV Sedation with Anesthesia    Complications: None    Specimen(s):  * No specimens in log *    HOSPITAL COURSE: The patient was admitted for elective procedure  The procedure was uncomplicated and the the patient was discharged home post procedure          SIGNATURE: Bhavesh Tolbert MD  DATE: 2020  TIME: 10:43 AM
DISCHARGE

## 2020-03-20 NOTE — OP NOTE
OPERATIVE REPORT  PATIENT NAME: Sam Lim    :  1975  MRN: [id number]  Pt Location:  OR ROOM 08    SURGERY DATE: 3/20/2020    Preoperative Dx: Anal fissure    Postoperative Dx:  Same    Procedure:  Exam under anesthesia  Left partial internal sphincterotomy  Excision of anal tag and papilla  Fissurectomy  Surgeon: Dr Kinza Guillen MD    First Assistant: None    Findings:  Posterior anal fissure  Specimen(s): * No specimens in log *    Anesthesia Type: IV Sedation with Anesthesia    EBL: Minimal    Complications: None      Procedure: The patient was brought to the operating room and laid in a prone kandice-knife position  Sedation was administered  The buttocks retracted with cloth tape  The area was prepped and draped  The patient was given an anal block with a combination of lidocaine Marcaine epinephrine and bicarbonate  The patient had posterior anal fissure  There was an internal papilla and external tag  These were excised with electrocautery  The internal sphincter was under tension  I therefore placed a Wood retractor and made an incision in the left lateral position exposing the internal sphincter  The muscle was then cut for partial thickness and partial length  The mucosal edges were reapproximated with 5 0 Polysorb  There was good hemostasis  A Surgicel was placed  The patient was awoke and transported to recovery room in stable condition  Attestation: I was present for the entire procedure        Patient Disposition: PACU      SIGNATURE: Kinza Guillen MD  DATE: 2020  TIME: 10:39 AM

## 2020-04-14 DIAGNOSIS — F41.9 ANXIETY: ICD-10-CM

## 2020-04-14 RX ORDER — PAROXETINE 10 MG/1
TABLET, FILM COATED ORAL
Qty: 90 TABLET | Refills: 0 | Status: SHIPPED | OUTPATIENT
Start: 2020-04-14 | End: 2020-07-17 | Stop reason: SDUPTHER

## 2020-07-17 DIAGNOSIS — F41.9 ANXIETY: ICD-10-CM

## 2020-07-17 RX ORDER — PAROXETINE 10 MG/1
TABLET, FILM COATED ORAL
Qty: 30 TABLET | Refills: 0 | Status: SHIPPED | OUTPATIENT
Start: 2020-07-17 | End: 2020-08-07 | Stop reason: SDUPTHER

## 2020-07-31 ENCOUNTER — APPOINTMENT (OUTPATIENT)
Dept: LAB | Facility: HOSPITAL | Age: 45
End: 2020-07-31
Payer: COMMERCIAL

## 2020-07-31 DIAGNOSIS — E55.9 VITAMIN D DEFICIENCY: ICD-10-CM

## 2020-07-31 DIAGNOSIS — K21.9 GASTROESOPHAGEAL REFLUX DISEASE WITHOUT ESOPHAGITIS: ICD-10-CM

## 2020-07-31 DIAGNOSIS — F41.9 ANXIETY: ICD-10-CM

## 2020-07-31 DIAGNOSIS — E78.5 DYSLIPIDEMIA: ICD-10-CM

## 2020-07-31 LAB
ALBUMIN SERPL BCP-MCNC: 3.6 G/DL (ref 3.5–5)
ALP SERPL-CCNC: 79 U/L (ref 46–116)
ALT SERPL W P-5'-P-CCNC: 39 U/L (ref 12–78)
ANION GAP SERPL CALCULATED.3IONS-SCNC: 8 MMOL/L (ref 4–13)
AST SERPL W P-5'-P-CCNC: 22 U/L (ref 5–45)
BILIRUB SERPL-MCNC: 0.64 MG/DL (ref 0.2–1)
BUN SERPL-MCNC: 9 MG/DL (ref 5–25)
CALCIUM SERPL-MCNC: 9 MG/DL (ref 8.3–10.1)
CHLORIDE SERPL-SCNC: 101 MMOL/L (ref 100–108)
CHOLEST SERPL-MCNC: 153 MG/DL (ref 50–200)
CO2 SERPL-SCNC: 29 MMOL/L (ref 21–32)
CREAT SERPL-MCNC: 0.76 MG/DL (ref 0.6–1.3)
GFR SERPL CREATININE-BSD FRML MDRD: 96 ML/MIN/1.73SQ M
GLUCOSE P FAST SERPL-MCNC: 90 MG/DL (ref 65–99)
HDLC SERPL-MCNC: 43 MG/DL
LDLC SERPL CALC-MCNC: 88 MG/DL (ref 0–100)
POTASSIUM SERPL-SCNC: 4.2 MMOL/L (ref 3.5–5.3)
PROT SERPL-MCNC: 7.9 G/DL (ref 6.4–8.2)
SODIUM SERPL-SCNC: 138 MMOL/L (ref 136–145)
TRIGL SERPL-MCNC: 112 MG/DL

## 2020-07-31 PROCEDURE — 80061 LIPID PANEL: CPT

## 2020-07-31 PROCEDURE — 80053 COMPREHEN METABOLIC PANEL: CPT

## 2020-07-31 PROCEDURE — 36415 COLL VENOUS BLD VENIPUNCTURE: CPT

## 2020-08-07 ENCOUNTER — OFFICE VISIT (OUTPATIENT)
Dept: FAMILY MEDICINE CLINIC | Facility: CLINIC | Age: 45
End: 2020-08-07
Payer: COMMERCIAL

## 2020-08-07 VITALS
DIASTOLIC BLOOD PRESSURE: 80 MMHG | WEIGHT: 225 LBS | HEIGHT: 60 IN | SYSTOLIC BLOOD PRESSURE: 130 MMHG | TEMPERATURE: 98.4 F | BODY MASS INDEX: 44.17 KG/M2 | HEART RATE: 80 BPM

## 2020-08-07 DIAGNOSIS — E78.5 DYSLIPIDEMIA: Primary | ICD-10-CM

## 2020-08-07 DIAGNOSIS — F41.9 ANXIETY: ICD-10-CM

## 2020-08-07 DIAGNOSIS — G47.9 SLEEP DISORDER: ICD-10-CM

## 2020-08-07 DIAGNOSIS — E55.9 VITAMIN D DEFICIENCY: ICD-10-CM

## 2020-08-07 PROBLEM — R07.9 CHEST PAIN: Status: RESOLVED | Noted: 2019-03-18 | Resolved: 2020-08-07

## 2020-08-07 PROCEDURE — 3008F BODY MASS INDEX DOCD: CPT | Performed by: PHYSICIAN ASSISTANT

## 2020-08-07 PROCEDURE — 1036F TOBACCO NON-USER: CPT | Performed by: PHYSICIAN ASSISTANT

## 2020-08-07 PROCEDURE — 99214 OFFICE O/P EST MOD 30 MIN: CPT | Performed by: PHYSICIAN ASSISTANT

## 2020-08-07 RX ORDER — PAROXETINE 10 MG/1
TABLET, FILM COATED ORAL
Qty: 30 TABLET | Refills: 5 | Status: SHIPPED | OUTPATIENT
Start: 2020-08-07 | End: 2021-02-16 | Stop reason: SDUPTHER

## 2020-08-07 RX ORDER — FAMOTIDINE 40 MG/1
40 TABLET, FILM COATED ORAL DAILY
Qty: 30 TABLET | Refills: 5 | Status: SHIPPED | OUTPATIENT
Start: 2020-08-07 | End: 2021-01-28

## 2020-08-07 NOTE — PATIENT INSTRUCTIONS
Problem List Items Addressed This Visit        Other    Anxiety    Dyslipidemia - Primary     LDL very stable on Crestor 5 mg continue recheck 6 months             Other Visit Diagnoses     Sleep disorder

## 2020-08-07 NOTE — LETTER
August 7, 2020     Patient: Rocío Harrington   YOB: 1975   Date of Visit: 8/7/2020       To Whom it May Concern:    Rui Oliva is under my professional care  She was seen in my office on 8/7/2020 to review lab results and was found to be in good standing health  If you have any questions or concerns, please don't hesitate to call           Sincerely,          Nabil Elizalde PA-C        CC: No Recipients

## 2020-08-07 NOTE — PROGRESS NOTES
Assessment and Plan:    Problem List Items Addressed This Visit        Other    Anxiety     Very stable on Paxil 10 mg continue refills given  Recheck 6 months  Relevant Medications    PARoxetine (PAXIL) 10 mg tablet    Dyslipidemia - Primary     LDL very stable on Crestor 5 mg continue recheck 6 months  Relevant Orders    Lipid Panel with Direct LDL reflex    Comprehensive metabolic panel    Vitamin D deficiency     Check vitamin-D level next labs  Relevant Orders    Vitamin D 25 hydroxy      Other Visit Diagnoses     Sleep disorder        Relevant Medications    famotidine (PEPCID) 40 MG tablet                 Diagnoses and all orders for this visit:    Dyslipidemia  -     Lipid Panel with Direct LDL reflex; Future  -     Comprehensive metabolic panel; Future    Anxiety  -     PARoxetine (PAXIL) 10 mg tablet; TAKE ONE TABLET BY MOUTH EVERY DAY    Sleep disorder  -     famotidine (PEPCID) 40 MG tablet; Take 1 tablet (40 mg total) by mouth daily (At bedtime)    Vitamin D deficiency  -     Vitamin D 25 hydroxy; Future              Subjective:      Patient ID: Sylwia Foreman is a 38 y o  female  CC:    Chief Complaint   Patient presents with    Physical Exam     Yearly PE  Pt did have BW done  She states there are no concerns at this time  -  [facility]       HPI:      Sylwia Foreman is here for chronic conditions f/u including the diagnosis of Anxiety  Sleep disorder  Dyslipidemia  (primary encounter diagnosis)  Vitamin d deficiency   Pt  states they are taking all medications as directed without complaints or side effects   Pt  had labs done prior to today's visit which included Recent Results (from the past 672 hour(s))  -Comprehensive metabolic panel  Collection Time: 07/31/20  8:23 AM       Result                      Value             Ref Range           Sodium                      138               136 - 145 mm*       Potassium                   4 2               3 5 - 5 3 mm* Chloride                    101               100 - 108 mm*       CO2                         29                21 - 32 mmol*       ANION GAP                   8                 4 - 13 mmol/L       BUN                         9                 5 - 25 mg/dL        Creatinine                  0 76              0 60 - 1 30 *       Glucose, Fasting            90                65 - 99 mg/dL       Calcium                     9 0               8 3 - 10 1 m*       AST                         22                5 - 45 U/L          ALT                         39                12 - 78 U/L         Alkaline Phosphatase        79                46 - 116 U/L        Total Protein               7 9               6 4 - 8 2 g/*       Albumin                     3 6               3 5 - 5 0 g/*       Total Bilirubin             0 64              0 20 - 1 00 *       eGFR                        96                ml/min/1 73s*  -Lipid Panel with Direct LDL reflex  Collection Time: 07/31/20  8:23 AM       Result                      Value             Ref Range           Cholesterol                 153               50 - 200 mg/*       Triglycerides               112               <=150 mg/dL         HDL, Direct                 43                >=40 mg/dL          LDL Calculated              88                0 - 100 mg/dL        The following portions of the patient's history were reviewed and updated as appropriate: allergies, current medications, past family history, past medical history, past social history, past surgical history and problem list       Review of Systems   Constitutional: Negative  HENT: Negative  Eyes: Negative  Respiratory: Negative  Cardiovascular: Negative  Gastrointestinal: Negative  Endocrine: Negative  Genitourinary: Negative  Musculoskeletal: Negative  Skin: Negative  Allergic/Immunologic: Negative  Neurological: Negative  Hematological: Negative      Psychiatric/Behavioral: Negative  Data to review:       Objective:    Vitals:    08/07/20 1511   BP: 130/80   BP Location: Left arm   Patient Position: Sitting   Cuff Size: Large   Pulse: 80   Temp: 98 4 °F (36 9 °C)   TempSrc: Temporal   Weight: 102 kg (225 lb)   Height: 5' (1 524 m)        Physical Exam  Vitals signs and nursing note reviewed  Constitutional:       General: She is not in acute distress  Appearance: She is well-developed  She is not diaphoretic  HENT:      Head: Normocephalic and atraumatic  Eyes:      General:         Right eye: No discharge  Left eye: No discharge  Conjunctiva/sclera: Conjunctivae normal    Neck:      Musculoskeletal: Neck supple  Vascular: No carotid bruit  Cardiovascular:      Rate and Rhythm: Normal rate and regular rhythm  Heart sounds: Normal heart sounds  No murmur  No friction rub  No gallop  Pulmonary:      Effort: Pulmonary effort is normal  No respiratory distress  Breath sounds: Normal breath sounds  No wheezing or rales  Skin:     General: Skin is warm and dry  Neurological:      Mental Status: She is alert and oriented to person, place, and time     Psychiatric:         Judgment: Judgment normal

## 2021-01-19 ENCOUNTER — IMMUNIZATIONS (OUTPATIENT)
Dept: FAMILY MEDICINE CLINIC | Facility: HOSPITAL | Age: 46
End: 2021-01-19

## 2021-01-19 DIAGNOSIS — Z23 ENCOUNTER FOR IMMUNIZATION: Primary | ICD-10-CM

## 2021-01-19 PROCEDURE — 91301 SARS-COV-2 / COVID-19 MRNA VACCINE (MODERNA) 100 MCG: CPT

## 2021-01-19 PROCEDURE — 0011A SARS-COV-2 / COVID-19 MRNA VACCINE (MODERNA) 100 MCG: CPT

## 2021-01-28 ENCOUNTER — OFFICE VISIT (OUTPATIENT)
Dept: OBGYN CLINIC | Facility: CLINIC | Age: 46
End: 2021-01-28
Payer: COMMERCIAL

## 2021-01-28 VITALS
WEIGHT: 207 LBS | DIASTOLIC BLOOD PRESSURE: 72 MMHG | SYSTOLIC BLOOD PRESSURE: 114 MMHG | BODY MASS INDEX: 40.64 KG/M2 | OXYGEN SATURATION: 97 % | HEIGHT: 60 IN | HEART RATE: 85 BPM

## 2021-01-28 DIAGNOSIS — Z12.31 ENCOUNTER FOR SCREENING MAMMOGRAM FOR BREAST CANCER: ICD-10-CM

## 2021-01-28 DIAGNOSIS — Z01.419 ENCOUNTER FOR GYNECOLOGICAL EXAMINATION WITHOUT ABNORMAL FINDING: Primary | ICD-10-CM

## 2021-01-28 DIAGNOSIS — Z12.4 ENCOUNTER FOR PAPANICOLAOU SMEAR FOR CERVICAL CANCER SCREENING: ICD-10-CM

## 2021-01-28 DIAGNOSIS — Z11.51 SCREENING FOR HPV (HUMAN PAPILLOMAVIRUS): ICD-10-CM

## 2021-01-28 PROCEDURE — 99386 PREV VISIT NEW AGE 40-64: CPT | Performed by: NURSE PRACTITIONER

## 2021-01-28 PROCEDURE — G0145 SCR C/V CYTO,THINLAYER,RESCR: HCPCS | Performed by: NURSE PRACTITIONER

## 2021-01-28 PROCEDURE — 87624 HPV HI-RISK TYP POOLED RSLT: CPT | Performed by: NURSE PRACTITIONER

## 2021-01-28 NOTE — PROGRESS NOTES
Assessment / Plan    1  Encounter for gynecological examination without abnormal finding  Normal well woman exam  Pap with hpv obtained  BC- condoms/ rhythm    2  Encounter for screening mammogram for breast cancer    - Mammo screening bilateral w 3d & cad; Future    3  Encounter for Papanicolaou smear for cervical cancer screening    - Liquid-based pap, screening    4  Screening for HPV (human papillomavirus)    - Liquid-based pap, screening      Subjective      Jose Jarrell is a 41 y o  female who presents for her annual gynecologic exam   NEW/ former patient    Doing well, no complaints  last seen  by Kavon Clemente  2016 paphpv negative  2019 mammo negative  Exercises daily  Using "Dreampod" chon to track diet    Periods are regular   Current contraception: condoms and rhythm method  History of abnormal Pap smear: yes - years ago  Family history of breast,uterine, ovarian or colon cancer: yes - mat aunt breast ca age 29      Menstrual History:  OB History        2    Para   2    Term   2            AB        Living           SAB        TAB        Ectopic        Multiple        Live Births               Obstetric Comments   2 vaginal  Menarche 12              Patient's last menstrual period was 01/15/2021 (exact date)    Period Cycle (Days): 28  Period Duration (Days): 4  Period Pattern: Regular  Menstrual Flow: Light, Heavy  Menstrual Control: Panty liner, Maxi pad    Past Medical History:   Diagnosis Date    Abnormal Pap smear of cervix     Anxiety     Breast nodule 2009    Fibroglandular changes    GERD (gastroesophageal reflux disease)     Hyperlipidemia     Migraine with aura and without status migrainosus, not intractable     Morbid obesity with BMI of 40 0-44 9, adult (HCC)     Peptic ulcer      Past Surgical History:   Procedure Laterality Date    COLONOSCOPY      ORTHODONTIC TREATMENT      16 yo; incisors pulled down    MT ANAL SPHINCTEROTOMY N/A 3/20/2020    Procedure: LEFT PARTIAL SPHINCTEROTOMY; AND FISSURECTOMY;  Surgeon: Haroldo Atkins MD;  Location: [address] [place] [address];  Service: Colorectal    WISDOM TOOTH EXTRACTION       Current Outpatient Medications on File Prior to Visit   Medication Sig Dispense Refill    Ashwagandha 500 MG CAPS Take by mouth      b complex vitamins capsule Take 1 capsule by mouth daily      Cholecalciferol (VITAMIN D3) 2000 units TABS Take 2,000 Units by mouth daily      hydrocortisone (ANUSOL-HC, PROCTOSOL HC,) 2 5 % rectal cream Insert into the rectum 2 (two) times a day 30 g 0    multivitamin (THERAGRAN) TABS Take 1 tablet by mouth daily      PARoxetine (PAXIL) 10 mg tablet TAKE ONE TABLET BY MOUTH EVERY DAY 30 tablet 5    rosuvastatin (CRESTOR) 5 mg tablet Take 1 tablet (5 mg total) by mouth daily 90 tablet 3    Zinc 50 MG TABS Take by mouth      [DISCONTINUED] famotidine (PEPCID) 40 MG tablet Take 1 tablet (40 mg total) by mouth daily (At bedtime) (Patient not taking: Reported on 1/28/2021) 30 tablet 5    [DISCONTINUED] Garlic 6228 MG CAPS Take by mouth       No current facility-administered medications on file prior to visit        Family History   Problem Relation Age of Onset    Rheum arthritis Mother     Hypertension Mother     Hyperlipidemia Mother     Hepatitis Father         B    Hyperlipidemia Father     Hypertension Father     Lung cancer Maternal Grandmother         over [de-identified]    Rheum arthritis Maternal Grandmother     Kidney disease Maternal Grandfather     Anxiety disorder Paternal Grandmother     Depression Paternal Grandmother     Bipolar disorder Paternal Grandmother     Lung cancer Paternal Grandmother         over [de-identified]    Breast cancer Maternal Aunt 29    Nephrolithiasis Family     Anxiety disorder Sister     Depression Sister     Kidney disease Paternal Grandfather     Heart disease Paternal Aunt     Colon cancer Neg Hx     Ovarian cancer Neg Hx     Uterine cancer Neg Hx        The following portions of the patient's history were reviewed and updated as appropriate: allergies, current medications, past family history, past medical history, past social history, past surgical history and problem list     Review of Systems      Review of Systems   Constitutional: Negative for chills and fever  Respiratory: Negative for cough and shortness of breath  Gastrointestinal: Negative for abdominal distention, abdominal pain, blood in stool, constipation, diarrhea, nausea and vomiting  Genitourinary: Negative for difficulty urinating, dysuria, frequency, genital sores, hematuria, menstrual problem, pelvic pain, urgency, vaginal bleeding and vaginal discharge  Musculoskeletal: Negative for arthralgias and myalgias  Breasts:  Negative for skin changes, dimpling, asymmetry, nipple discharge, redness, tenderness or palpable masses    Objective      /72 (BP Location: Left arm, Patient Position: Sitting, Cuff Size: Standard)   Pulse 85   Ht 5' (1 524 m)   Wt 93 9 kg (207 lb)   LMP 01/15/2021 (Exact Date)   SpO2 97%   Breastfeeding No   BMI 40 43 kg/m²   Physical Exam  Constitutional:       General: She is not in acute distress  Appearance: Normal appearance  She is well-developed  She is obese  She is not ill-appearing or diaphoretic  HENT:      Head: Normocephalic and atraumatic  Eyes:      Pupils: Pupils are equal, round, and reactive to light  Neck:      Musculoskeletal: Neck supple  Thyroid: No thyromegaly  Pulmonary:      Effort: Pulmonary effort is normal    Chest:      Breasts: Breasts are symmetrical          Right: No inverted nipple, mass, nipple discharge, skin change or tenderness  Left: No inverted nipple, mass, nipple discharge, skin change or tenderness  Abdominal:      General: There is no distension  Palpations: Abdomen is soft  There is no mass  Tenderness: There is no abdominal tenderness  There is no guarding or rebound  Genitourinary:     General: Normal vulva  Exam position: Lithotomy position  Labia:         Right: No rash, tenderness, lesion or injury  Left: No rash, tenderness, lesion or injury  Vagina: No signs of injury and foreign body  No vaginal discharge, erythema, tenderness or bleeding  Cervix: No cervical motion tenderness, discharge or friability  Uterus: Not enlarged and not tender  Adnexa:         Right: No mass or tenderness  Left: No mass or tenderness  Lymphadenopathy:      Cervical: No cervical adenopathy  Upper Body:      Right upper body: No supraclavicular adenopathy  Left upper body: No supraclavicular adenopathy  Skin:     General: Skin is warm and dry  Neurological:      General: No focal deficit present  Mental Status: She is alert and oriented to person, place, and time  Psychiatric:         Mood and Affect: Mood normal          Behavior: Behavior normal          Thought Content:  Thought content normal          Judgment: Judgment normal

## 2021-01-30 LAB
HPV HR 12 DNA CVX QL NAA+PROBE: NEGATIVE
HPV16 DNA CVX QL NAA+PROBE: NEGATIVE
HPV18 DNA CVX QL NAA+PROBE: NEGATIVE

## 2021-02-04 LAB
LAB AP GYN PRIMARY INTERPRETATION: NORMAL
LAB AP LMP: NORMAL
Lab: NORMAL
PATH INTERP SPEC-IMP: NORMAL

## 2021-02-16 ENCOUNTER — OFFICE VISIT (OUTPATIENT)
Dept: FAMILY MEDICINE CLINIC | Facility: CLINIC | Age: 46
End: 2021-02-16
Payer: COMMERCIAL

## 2021-02-16 VITALS
SYSTOLIC BLOOD PRESSURE: 118 MMHG | WEIGHT: 204 LBS | RESPIRATION RATE: 16 BRPM | HEIGHT: 60 IN | BODY MASS INDEX: 40.05 KG/M2 | TEMPERATURE: 98.7 F | HEART RATE: 72 BPM | DIASTOLIC BLOOD PRESSURE: 72 MMHG

## 2021-02-16 DIAGNOSIS — E66.9 OBESITY (BMI 35.0-39.9 WITHOUT COMORBIDITY): ICD-10-CM

## 2021-02-16 DIAGNOSIS — E78.5 DYSLIPIDEMIA: ICD-10-CM

## 2021-02-16 DIAGNOSIS — E55.9 VITAMIN D DEFICIENCY: Primary | ICD-10-CM

## 2021-02-16 DIAGNOSIS — F41.9 ANXIETY: ICD-10-CM

## 2021-02-16 DIAGNOSIS — Z11.4 SCREENING FOR HIV (HUMAN IMMUNODEFICIENCY VIRUS): ICD-10-CM

## 2021-02-16 PROBLEM — J01.90 ACUTE NON-RECURRENT SINUSITIS: Status: RESOLVED | Noted: 2020-02-20 | Resolved: 2021-02-16

## 2021-02-16 PROCEDURE — 99214 OFFICE O/P EST MOD 30 MIN: CPT | Performed by: PHYSICIAN ASSISTANT

## 2021-02-16 PROCEDURE — 1036F TOBACCO NON-USER: CPT | Performed by: PHYSICIAN ASSISTANT

## 2021-02-16 PROCEDURE — 3008F BODY MASS INDEX DOCD: CPT | Performed by: PHYSICIAN ASSISTANT

## 2021-02-16 RX ORDER — PAROXETINE 10 MG/1
TABLET, FILM COATED ORAL
Qty: 30 TABLET | Refills: 0 | Status: SHIPPED | OUTPATIENT
Start: 2021-02-16 | End: 2021-02-16 | Stop reason: SDUPTHER

## 2021-02-16 RX ORDER — ROSUVASTATIN CALCIUM 5 MG/1
5 TABLET, COATED ORAL DAILY
Qty: 90 TABLET | Refills: 3 | Status: SHIPPED | OUTPATIENT
Start: 2021-02-16

## 2021-02-16 RX ORDER — PAROXETINE 10 MG/1
TABLET, FILM COATED ORAL
Qty: 45 TABLET | Refills: 11 | Status: SHIPPED | OUTPATIENT
Start: 2021-02-16 | End: 2021-04-19 | Stop reason: SDUPTHER

## 2021-02-16 NOTE — ASSESSMENT & PLAN NOTE
Pt  Has been using Noom and has lost 20 pounds since Sept  She is congratulated and encouraged to continue her efforts

## 2021-02-16 NOTE — PROGRESS NOTES
Assessment and Plan:    Problem List Items Addressed This Visit        Other    Anxiety     Not 100 % controlled  Will increase paxil to 1 5 tabs of 10 mg once daily  Refills given  Relevant Medications    PARoxetine (PAXIL) 10 mg tablet    Dyslipidemia     Due for lipid panel order reprinted  Relevant Medications    rosuvastatin (CRESTOR) 5 mg tablet    Other Relevant Orders    Lipid Panel with Direct LDL reflex    Comprehensive metabolic panel    Obesity (BMI 35 0-39 9 without comorbidity)     Pt  Has been using Noom and has lost 20 pounds since Sept  She is congratulated and encouraged to continue her efforts  Vitamin D deficiency - Primary     Due for vitamin-D level  Order reprinted  Other Visit Diagnoses     Screening for HIV (human immunodeficiency virus)        Relevant Orders    HIV 1/2 Antigen/Antibody (4th Generation) w Reflex [facility]                 Diagnoses and all orders for this visit:    Vitamin D deficiency    Anxiety  -     PARoxetine (PAXIL) 10 mg tablet; TAKE ONE TABLET BY MOUTH EVERY DAY    Dyslipidemia  -     rosuvastatin (CRESTOR) 5 mg tablet; Take 1 tablet (5 mg total) by mouth daily  -     Lipid Panel with Direct LDL reflex; Future  -     Comprehensive metabolic panel; Future    Obesity (BMI 35 0-39 9 without comorbidity)    Screening for HIV (human immunodeficiency virus)  -     HIV 1/2 Antigen/Antibody (4th Generation) w Reflex [facility]; Future              Subjective:      Patient ID: Krissy Lynn is a 41 y o  female  CC:    Chief Complaint   Patient presents with    Follow-up     Patient present today for a follow up on her medications  HPI:      Krissy Lynn is here for chronic conditions f/u including the diagnosis of Anxiety  Dyslipidemia   Pt  states they are taking all medications as directed without complaints or side effects   Pt  had labs done prior to today's visit which included Recent Results (from the past 672 hour(s))  -Liquid-based pap, screening  Collection Time: 01/28/21  4:33 PM       Result                      Value             Ref Range           Case Report                                                   Gynecologic Cytology Report                       Case: [id number]                                 Authorizing Provider:  Lorena Skiff, CRNP     Collected:           01/28/2021 1633             Ordering Location[facility]     [facility] Received:            01/28/2021 1633             First Screen:          Dae Graham                                                              Rescreen:              Christin Jules                                                                 Specimen:    LIQUID-BASED PAP, SCREENING, Cervix, Endocervical                                            Primary Interpretation                                        Negative for intraepithelial lesion or malignancy       Interpretation                                                Shift in alistair suggestive of bacterial vaginosis Fungal organisms morphologically consistent with Candida spp       Specimen Adequacy                                             Satisfactory for evaluation  Endocervical/transformation zone component present  Additional Information                                        [employer]'s FDA approved ,  and ThinPrep Imaging Duo System are  utilized with strict adherence to the 's instruction manual to  prepare gynecologic and non-gynecologic cytology specimens for the  production of ThinPrep slides as well as for gynecologic ThinPrep imaging  These processes have been validated by our laboratory and/or by the    The Pap test is not a diagnostic procedure and should not be used as the  sole means to detect cervical cancer  It is only a screening procedure to  aid in the detection of cervical cancer and its precursors   Both  false-negative and false-positive results have been experienced  Your  patient's test result should be interpreted in this context together with  the history and clinical findings  (FORMATTING REMOVED)       LMP                         1/15/2021                        -HPV High Risk  Collection Time: 01/28/21  4:33 PM  Specimen: Genital       Result                      Value             Ref Range           HPV Other HR                Negative          Negative            HPV16                       Negative          Negative            HPV18                       Negative          Negative           Patient is a  for [employer] and has been having a very difficult stressful time the past year with the [employer] pandemic and having to 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 [employer]  She really does not think the Paxil 10 mg is working enough for her but she is afraid to go up because of weight issues  She has been doing weight loss efforts through [employer] and has lost 20 lb since September  The following portions of the patient's history were reviewed and updated as appropriate: allergies, current medications, past family history, past medical history, past social history, past surgical history and problem list       Review of Systems   Constitutional: Negative  HENT: Negative  Eyes: Negative  Respiratory: Negative  Cardiovascular: Negative  Gastrointestinal: Negative  Endocrine: Negative  Genitourinary: Negative  Musculoskeletal: Negative  Skin: Negative  Allergic/Immunologic: Negative  Neurological: Negative  Hematological: Negative  Psychiatric/Behavioral: Negative            Data to review:       Objective:    Vitals:    02/16/21 1515   BP: 118/72   BP Location: Left arm   Patient Position: Sitting   Cuff Size: Large   Pulse: 72   Resp: 16   Temp: 98 7 °F (37 1 °C)   TempSrc: Tympanic   Weight: 92 5 kg (204 lb)   Height: 5' (1 524 m) Physical Exam  Vitals signs and nursing note reviewed  Constitutional:       Appearance: Normal appearance  She is well-developed  HENT:      Head: Normocephalic and atraumatic  Eyes:      General: Lids are normal       Conjunctiva/sclera: Conjunctivae normal       Pupils: Pupils are equal, round, and reactive to light  Cardiovascular:      Rate and Rhythm: Normal rate and regular rhythm  Heart sounds: No murmur  Pulmonary:      Effort: Pulmonary effort is normal       Breath sounds: Normal breath sounds  Skin:     General: Skin is warm and dry  Neurological:      General: No focal deficit present  Mental Status: She is alert  Coordination: Coordination is intact  Psychiatric:         Mood and Affect: Mood normal          Behavior: Behavior normal  Behavior is cooperative  Thought Content: Thought content normal          Judgment: Judgment normal            BMI Counseling: Body mass index is 39 84 kg/m²  The BMI is above normal  Nutrition recommendations include decreasing portion sizes, encouraging healthy choices of fruits and vegetables, decreasing fast food intake, consuming healthier snacks, limiting drinks that contain sugar, moderation in carbohydrate intake, increasing intake of lean protein, reducing intake of saturated and trans fat and reducing intake of cholesterol  Exercise recommendations include exercising 3-5 times per week  No pharmacotherapy was ordered

## 2021-02-16 NOTE — PATIENT INSTRUCTIONS
Problem List Items Addressed This Visit        Other    Anxiety     Not 100 % controlled  Will increase paxil to 1 5 tabs of 10 mg once daily  Refills given  Relevant Medications    PARoxetine (PAXIL) 10 mg tablet    Dyslipidemia     Due for lipid panel order reprinted  Relevant Medications    rosuvastatin (CRESTOR) 5 mg tablet    Other Relevant Orders    Lipid Panel with Direct LDL reflex    Comprehensive metabolic panel    Obesity (BMI 35 0-39 9 without comorbidity)     Pt  Has been using Noom and has lost 20 pounds since Sept  She is congratulated and encouraged to continue her efforts  Vitamin D deficiency - Primary     Due for vitamin-D level  Order reprinted             Other Visit Diagnoses     Screening for HIV (human immunodeficiency virus)        Relevant Orders    HIV 1/2 Antigen/Antibody (4th Generation) w Reflex [facility]

## 2021-02-16 NOTE — TELEPHONE ENCOUNTER
Last office visit: 8/7/2020  Last refilled: 8/7/2020  #30 x 5 R  Last labs:  Ordered 8/7/2020  Next office visit: 2/19/21 Cancelled    Left message on cell number to call the office and schedule office visit

## 2021-02-17 ENCOUNTER — IMMUNIZATIONS (OUTPATIENT)
Dept: FAMILY MEDICINE CLINIC | Facility: HOSPITAL | Age: 46
End: 2021-02-17

## 2021-02-17 DIAGNOSIS — Z23 ENCOUNTER FOR IMMUNIZATION: Primary | ICD-10-CM

## 2021-02-17 PROCEDURE — 91301 SARS-COV-2 / COVID-19 MRNA VACCINE (MODERNA) 100 MCG: CPT

## 2021-02-17 PROCEDURE — 0012A SARS-COV-2 / COVID-19 MRNA VACCINE (MODERNA) 100 MCG: CPT

## 2021-03-13 ENCOUNTER — LAB (OUTPATIENT)
Dept: LAB | Facility: HOSPITAL | Age: 46
End: 2021-03-13
Payer: COMMERCIAL

## 2021-03-13 DIAGNOSIS — E78.5 DYSLIPIDEMIA: ICD-10-CM

## 2021-03-13 DIAGNOSIS — E55.9 VITAMIN D DEFICIENCY: ICD-10-CM

## 2021-03-13 DIAGNOSIS — Z11.4 SCREENING FOR HIV (HUMAN IMMUNODEFICIENCY VIRUS): ICD-10-CM

## 2021-03-13 LAB
25(OH)D3 SERPL-MCNC: 34 NG/ML (ref 30–100)
ALBUMIN SERPL BCP-MCNC: 3.8 G/DL (ref 3.5–5)
ALP SERPL-CCNC: 77 U/L (ref 46–116)
ALT SERPL W P-5'-P-CCNC: 28 U/L (ref 12–78)
ANION GAP SERPL CALCULATED.3IONS-SCNC: 8 MMOL/L (ref 4–13)
AST SERPL W P-5'-P-CCNC: 16 U/L (ref 5–45)
BILIRUB SERPL-MCNC: 0.82 MG/DL (ref 0.2–1)
BUN SERPL-MCNC: 5 MG/DL (ref 5–25)
CALCIUM SERPL-MCNC: 9.3 MG/DL (ref 8.3–10.1)
CHLORIDE SERPL-SCNC: 104 MMOL/L (ref 100–108)
CHOLEST SERPL-MCNC: 144 MG/DL (ref 50–200)
CO2 SERPL-SCNC: 29 MMOL/L (ref 21–32)
CREAT SERPL-MCNC: 0.89 MG/DL (ref 0.6–1.3)
GFR SERPL CREATININE-BSD FRML MDRD: 79 ML/MIN/1.73SQ M
GLUCOSE P FAST SERPL-MCNC: 95 MG/DL (ref 65–99)
HDLC SERPL-MCNC: 45 MG/DL
LDLC SERPL CALC-MCNC: 81 MG/DL (ref 0–100)
POTASSIUM SERPL-SCNC: 4.6 MMOL/L (ref 3.5–5.3)
PROT SERPL-MCNC: 7.7 G/DL (ref 6.4–8.2)
SODIUM SERPL-SCNC: 141 MMOL/L (ref 136–145)
TRIGL SERPL-MCNC: 88 MG/DL

## 2021-03-13 PROCEDURE — 82306 VITAMIN D 25 HYDROXY: CPT

## 2021-03-13 PROCEDURE — 80053 COMPREHEN METABOLIC PANEL: CPT

## 2021-03-13 PROCEDURE — 87389 HIV-1 AG W/HIV-1&-2 AB AG IA: CPT

## 2021-03-13 PROCEDURE — 36415 COLL VENOUS BLD VENIPUNCTURE: CPT

## 2021-03-13 PROCEDURE — 80061 LIPID PANEL: CPT

## 2021-03-14 DIAGNOSIS — E78.5 DYSLIPIDEMIA: Primary | ICD-10-CM

## 2021-03-14 NOTE — RESULT ENCOUNTER NOTE
Please let pt know that her labs are great  Very stable and normal  No change to meds or supplements  I have placed orders to be done fasting in 6 months  Thank you

## 2021-03-15 LAB — HIV 1+2 AB+HIV1 P24 AG SERPL QL IA: NORMAL

## 2021-03-30 ENCOUNTER — OFFICE VISIT (OUTPATIENT)
Dept: URGENT CARE | Facility: MEDICAL CENTER | Age: 46
End: 2021-03-30
Payer: COMMERCIAL

## 2021-03-30 VITALS
SYSTOLIC BLOOD PRESSURE: 133 MMHG | RESPIRATION RATE: 20 BRPM | HEART RATE: 78 BPM | HEIGHT: 60 IN | WEIGHT: 200 LBS | BODY MASS INDEX: 39.27 KG/M2 | OXYGEN SATURATION: 96 % | TEMPERATURE: 99.6 F | DIASTOLIC BLOOD PRESSURE: 74 MMHG

## 2021-03-30 DIAGNOSIS — Z23 NEED FOR IMMUNIZATION AGAINST PERTUSSIS: ICD-10-CM

## 2021-03-30 DIAGNOSIS — S91.332A PUNCTURE WOUND OF LEFT FOOT, INITIAL ENCOUNTER: Primary | ICD-10-CM

## 2021-03-30 PROCEDURE — 99203 OFFICE O/P NEW LOW 30 MIN: CPT | Performed by: PHYSICIAN ASSISTANT

## 2021-03-30 PROCEDURE — 90715 TDAP VACCINE 7 YRS/> IM: CPT | Performed by: PHYSICIAN ASSISTANT

## 2021-03-30 PROCEDURE — 90715 TDAP VACCINE 7 YRS/> IM: CPT

## 2021-03-31 NOTE — PATIENT INSTRUCTIONS
Patient was educated on puncture wound  Patient was educated to keep puncture wound clean and dry  Patient was educated to wash wound with SOAP and water  Patient was told to go to ED if she begins to notice increase pain or redness  Puncture Wound   WHAT YOU NEED TO KNOW:   A puncture wound is a hole in the skin made by a sharp, pointed object  The area may be bruised or swollen  You may have bleeding, pain, or trouble moving the affected area  DISCHARGE INSTRUCTIONS:   Return to the emergency department if:   · You have severe pain  · You have numbness or tingling in the area of your wound  · Your wound starts bleeding and does not stop, even after you apply pressure  Call your doctor if:   · You have new drainage or a bad odor coming from the wound  · You have a fever or chills  · You have increased swelling, redness, or pain  · You have red streaks on your skin coming from your wound  · You have questions or concerns about your condition or care  Medicines: You may need any of the following:  · NSAIDs , such as ibuprofen, help decrease swelling, pain, and fever  This medicine is available with or without a doctor's order  NSAIDs can cause stomach bleeding or kidney problems in certain people  If you take blood thinner medicine, always ask your healthcare provider if NSAIDs are safe for you  Always read the medicine label and follow directions  · Antibiotics  help prevent a bacterial infection  · Take your medicine as directed  Contact your healthcare provider if you think your medicine is not helping or if you have side effects  Tell him of her if you are allergic to any medicine  Keep a list of the medicines, vitamins, and herbs you take  Include the amounts, and when and why you take them  Bring the list or the pill bottles to follow-up visits  Carry your medicine list with you in case of an emergency  Care for your wound as directed:  Keep your wound clean and dry  When you are allowed to bathe, carefully wash the wound with soap and water  Dry the area and put on new, clean bandages as directed  Change your bandages when they get wet or dirty  Rest and elevate  the injured area above the level of your heart as often as you can  This will help decrease swelling and pain  Prop your injured area on pillows or blankets to keep it elevated comfortably  Follow up with your doctor in 2 to 3 days:  Write down your questions so you remember to ask them during your visits  © Copyright [facility] [address] Information is for End User's use only and may not be sold, redistributed or otherwise used for commercial purposes  All illustrations and images included in CareNotes® are the copyrighted property of [employer]  or [facility] Melina Yee   The above information is an  only  It is not intended as medical advice for individual conditions or treatments  Talk to your doctor, nurse or pharmacist before following any medical regimen to see if it is safe and effective for you

## 2021-03-31 NOTE — PROGRESS NOTES
Left     [facility]        NAME: Morenita Richards is a 41 y o  female  : 1975    MRN: [id number]  DATE: 2021  TIME: 8:51 PM    Assessment and Plan   Puncture wound of left foot, initial encounter [S91 332A]  1  Puncture wound of left foot, initial encounter         Wound was cleaned with alcohol and betadine and wrapped  Patient was asked if any of the nail was stuck inside and she said no, I"m just here for my tetanus shot  Declined x-ray  Patient Instructions     Patient was educated on puncture wound  Patient was educated to keep puncture wound clean and dry  Patient was educated to wash wound with SOAP and water  Patient was told to go to ED if she begins to notice increase pain or redness  Chief Complaint     Chief Complaint   Patient presents with    Foot Pain     Patient states she stepped on a eliel screw with her L foot  She states she cleaned it really well  She states she needs a tetnus shot  History of Present Illness       Patient presents today after stepping on a Eliel nail at 5:30 PM on 3/30/21  Patient denies any significant pain but would like a tetanus shot  Review of Systems   Review of Systems   Constitutional: Negative  Respiratory: Negative  Cardiovascular: Negative  Musculoskeletal:        Puncture wound on bottom of left foot  Psychiatric/Behavioral: Negative            Current Medications       Current Outpatient Medications:     Ashwagandha 500 MG CAPS, Take by mouth, Disp: , Rfl:     b complex vitamins capsule, Take 1 capsule by mouth daily, Disp: , Rfl:     Cholecalciferol (VITAMIN D3) 2000 units TABS, Take 2,000 Units by mouth daily, Disp: , Rfl:     hydrocortisone (ANUSOL-HC, PROCTOSOL HC,) 2 5 % rectal cream, Insert into the rectum 2 (two) times a day, Disp: 30 g, Rfl: 0    multivitamin (THERAGRAN) TABS, Take 1 tablet by mouth daily, Disp: , Rfl:     PARoxetine (PAXIL) 10 mg tablet, TAKE ONE TABLET BY MOUTH EVERY DAY, Disp: 45 tablet, Rfl: 11    rosuvastatin (CRESTOR) 5 mg tablet, Take 1 tablet (5 mg total) by mouth daily, Disp: 90 tablet, Rfl: 3    Zinc 50 MG TABS, Take by mouth, Disp: , Rfl:     Current Allergies     Allergies as of 03/30/2021 - Reviewed 03/30/2021   Allergen Reaction Noted    Lipitor [atorvastatin] Myalgia 03/18/2019    Morphine  08/07/2020            The following portions of the patient's history were reviewed and updated as appropriate: allergies, current medications, past family history, past medical history, past social history, past surgical history and problem list      Past Medical History:   Diagnosis Date    Abnormal Pap smear of cervix     Anxiety     Breast nodule 2009    Fibroglandular changes    GERD (gastroesophageal reflux disease)     Hyperlipidemia     Migraine with aura and without status migrainosus, not intractable     Morbid obesity with BMI of 40 0-44 9, adult ([facility] [address] )     Peptic ulcer        Past Surgical History:   Procedure Laterality Date    COLONOSCOPY      ORTHODONTIC TREATMENT      16 yo; incisors pulled down    IN ANAL SPHINCTEROTOMY N/A 3/20/2020    Procedure: LEFT PARTIAL SPHINCTEROTOMY; AND FISSURECTOMY;  Surgeon: Yasmin Trimble MD;  Location: [address] [place] [address];  Service: Colorectal    WISDOM TOOTH EXTRACTION         Family History   Problem Relation Age of Onset    Rheum arthritis Mother     Hypertension Mother     Hyperlipidemia Mother     Hepatitis Father         B    Hyperlipidemia Father     Hypertension Father     Lung cancer Maternal Grandmother         over [de-identified]    Rheum arthritis Maternal Grandmother     Kidney disease Maternal Grandfather     Anxiety disorder Paternal Grandmother     Depression Paternal Grandmother     Bipolar disorder Paternal Grandmother     Lung cancer Paternal Grandmother         over [de-identified]    Breast cancer Maternal Aunt 29    Nephrolithiasis Family     Anxiety disorder Sister     Depression Sister     Kidney disease Paternal Grandfather     Heart disease Paternal Aunt     Colon cancer Neg Hx     Ovarian cancer Neg Hx     Uterine cancer Neg Hx          Medications have been verified  Objective   /74   Pulse 78   Temp 99 6 °F (37 6 °C)   Resp 20   Ht 5' (1 524 m)   Wt 90 7 kg (200 lb)   LMP 03/07/2021   SpO2 96%   BMI 39 06 kg/m²   Patient's last menstrual period was 03/07/2021  Physical Exam     Physical Exam  Constitutional:       Appearance: She is normal weight  HENT:      Head: Normocephalic  Cardiovascular:      Rate and Rhythm: Normal rate and regular rhythm  Heart sounds: Normal heart sounds  Pulmonary:      Effort: Pulmonary effort is normal       Breath sounds: Normal breath sounds  Skin:     Comments: Puncture wound noted on left bottom of mid foot  No warmth or excessive redness  Neurological:      Mental Status: She is alert and oriented to person, place, and time     Psychiatric:         Mood and Affect: Mood normal          Behavior: Behavior normal

## 2021-04-19 DIAGNOSIS — F41.9 ANXIETY: ICD-10-CM

## 2021-04-19 RX ORDER — PAROXETINE HYDROCHLORIDE 20 MG/1
TABLET, FILM COATED ORAL
Qty: 30 TABLET | Refills: 11 | Status: SHIPPED | OUTPATIENT
Start: 2021-04-19 | End: 2021-06-07 | Stop reason: SDUPTHER

## 2021-06-07 ENCOUNTER — OFFICE VISIT (OUTPATIENT)
Dept: FAMILY MEDICINE CLINIC | Facility: CLINIC | Age: 46
End: 2021-06-07
Payer: COMMERCIAL

## 2021-06-07 VITALS
WEIGHT: 196.6 LBS | HEIGHT: 60 IN | DIASTOLIC BLOOD PRESSURE: 68 MMHG | HEART RATE: 84 BPM | SYSTOLIC BLOOD PRESSURE: 124 MMHG | BODY MASS INDEX: 38.6 KG/M2

## 2021-06-07 DIAGNOSIS — E78.5 DYSLIPIDEMIA: ICD-10-CM

## 2021-06-07 DIAGNOSIS — F41.9 ANXIETY: Primary | ICD-10-CM

## 2021-06-07 PROCEDURE — 99213 OFFICE O/P EST LOW 20 MIN: CPT | Performed by: PHYSICIAN ASSISTANT

## 2021-06-07 PROCEDURE — 1036F TOBACCO NON-USER: CPT | Performed by: PHYSICIAN ASSISTANT

## 2021-06-07 PROCEDURE — 3725F SCREEN DEPRESSION PERFORMED: CPT | Performed by: PHYSICIAN ASSISTANT

## 2021-06-07 PROCEDURE — 3008F BODY MASS INDEX DOCD: CPT | Performed by: PHYSICIAN ASSISTANT

## 2021-06-07 RX ORDER — PAROXETINE HYDROCHLORIDE 20 MG/1
TABLET, FILM COATED ORAL
Qty: 90 TABLET | Refills: 3 | Status: SHIPPED | OUTPATIENT
Start: 2021-06-07

## 2021-06-07 NOTE — ASSESSMENT & PLAN NOTE
Lipid due in Sept  Respiratory Care Services     Policy Number: -[id number]    Title: Aerosolized Medication Protocol    Effective Date: 10/1998    Revised Date: 06/2013, 03/2016    Reviewed Date: 05/2014/ 03/2015 , 06/2017       I. Policy: The Aerosolized Medication Protocol shall by implemented by Respiratory Care              Practitioners (RCP) for patients with orders to receive aerosol therapy with medication. II. Purpose: To open and maintain obstructed airways, the RCP, will utilize the following   protocol to select the indicated aerosolized medication(s) and determine the most effective method of delivery to the patient. III. Patient Type: All patients who are determined to meet aerosolized medication criteria as          outlined in this protocol. IV. Responsibility: Director, [address], registered Respiratory Care                                                     Practitioners (RCPs) with documented competency in the performance of                                     respiratory therapeutic techniques. V. Equipment needed:  A. Stethoscope  B. Pulse oximeter  C. IPPB machine and circuit  D. Aerosol nebulizer  E. MDI Inhaler     VI. Protocol:   A. The following conditions are accepted indications for aerosolized medication therapy. 1. Bronchospasm/wheezing  2. Impaired mucociliary clearance  3. Tracheobronchial mucosal congestion/and laryngeal stridor  4. Diseases which commonly require aerosolized medication therapy include, but are not limited to:  a. Asthma/reactive airway disease  b. Bronchitis/emphysema (COPD)  c. Cystic fibrosis  d. Severe laryngitis/tracheitis  e. Bronchiectasis  f. Smoke inhalation or chemical trauma to the lung or upper airway  g. Physical trauma to the upper airway  h. Laryngotracheobronchitis  i. Bronchiolitis  j. Non-specific wheezing              B. Indications for bronchodilator medications will include:  a.  Bronchospasm/ wheezing  b. Asthma/reactive airway disease  c. Chronic obstructive pulmonary disease  d. Obstructive defect on pulmonary function testing  C. Administration of medications  1. If a bronchodilator or any other type of respiratory medication is needed, a physician order must be indicated in the medication section in the patients EMR. 2. When the physician specifies the medication and dosage at the time of request, the ordered medication will be used as part of the care plan. D. The following guidelines will be utilized in the evaluation and selection of the         appropriate delivery device for indicated medication(s):  1. IPPB  a. Ventilation is inadequate (rapid shallow breathing)  b. Refractory atelectasis has developed, other forms of therapy are unsuccessful  c. Inability to clear secretions  d. Need to improve lung expansion  2. Unassisted aerosol (UA) is the preferred method of aerosol delivery and indicated if  a. Ventilation is inadequate  b. Patient demonstrates wheezing   c. patient is unable to perform MDI effectively   d. Patient preference  3. Metered Dose Inhaler (MDI)   a. Patient is alert/cooperative  b. Medication(s) available in this delivery method. c. Able to perform 3 second breath hold. d. Patient has demonstrated ability to use MDI effectively  e. Patient has used MDI therapy previously, either at home or in the hospital.  f. Note: The only approved inhalers on formulary are albuterol and Spiriva. VII. Guidelines:   Monitor patients vital signs and evaluate patients clinical status. The need to change medication and/or modality may be indicated by:  1. A pulse greater than 120 bpm, or if a pulse increase of 20 bpm occurs with bronchodilator medications. 2. Significant worsening of dyspnea or wheezing occurring during or within 30 minutes of discontinuing therapy.   3. Worsening of patients sensorium (e.g. patient becomes confused or obtunded, and unable to follow directions). 4. Worsening of patients chest x-ray. 5. Change in sputum (e.g. increased pulmonary infiltrate, which might indicate need for volume expansion therapy). 6. Patient has difficulty coughing up secretions, which might indicate need for acetylcysteine and/or bronchial hygiene therapy. 7. Call physician immediately if dyspnea worsens and is not responsive to modifications allowed by protocol. VIII. Clinical Responsibility:  A. The therapy assessment guidelines will be used to evaluate all patients receiving aerosolized medications with the exception of critical care areas. 1. RCPs will perform changes in therapy per protocol. 2. It will be the responsibility of RCP to provide instruction regarding respiratory medications, aerosol therapy and proper MDI technique, as well as, spacer usage to patients ordered MDI therapy. 3. Current therapy that is part of a patients home regimen will not be discontinued. IX. Documentation  A. Document assessment findings in the respiratory assessment section of the patients EMR. B. Document changes in therapy per protocol in the respiratory orders section and in the care plan section of the patients EMR. C. Document patient education in the patient education section of the patients EMR. X. Outcome Criteria:  A. Relief of wheezes and obstruction  B. Improved cough and sputum color and consistency  C. Improved chest x-ray  D. Improved arterial oxygen tension and or SaO2  E. Improved Peak Flow on asthmatic patients        XI. Related Protocols:  A. Respiratory Patient Care Protocols  B. Bronchial Hygiene Therapy  C.  Oxygen Protocol    Reference:                        Respiratory Care Services     Policy Number: -[id number]    Title: Patient Care Assessment Program    Effective Date: 01/1999    Revised Date: 05/2014    Reviewed Date: 06/2013/ 03/2015, 03/2016, 06/2017     Overview  In an effort to improve quality and reduce costs of respiratory care at [facility], the Respiratory Department has developed a number of Patient Care Protocols. These protocols have been developed according to [place] [address] and are utilized for those patients who are ordered respiratory therapy using therapeutic indications and standardized approaches for accomplishing objectives. Patient Care Protocols are intended to improve care by:   Defining the indications and standards of care agreed upon by the Pulmonary Medicine and [address] [place] [address] of [facility].  Training respiratory care practitioners to apply those criteria to individual patients and modify therapy as indicated by the protocols.  Documenting the indication and care plan as part of the initial ordering process.  Tapering or discontinuing treatments once the indication for therapy changes. The Patient Care Protocols shall be universally applied throughout the hospital as determined by   the Pulmonary Medicine and [address] [place] [address]. Rationale for Patient Care Assessment Protocols:   Continuous Quality Improvement   Cost containment   Standardization of care   Enhanced continuity of care   Utilization review   Timely intervention    The following patient care assessment protocols have been developed:   Aerosolized Medication    Bronchial Hygiene    Oxygen Weaning Protocol   Volume Expansion/Secretion Clearance Non-Vented   Ventilator Weaning Protocol   CVRU Fast Track Weaning Protocol   Asthma Treatment Protocol ER   Pediatric Asthma Treatment Protocol ER   Alpha-1 Antitrypsin Deficiency Protocol   Prone Positioning Protocol    The Director of Respiratory Care Services oversees the Patient Care Assessment Program. The Clinical/ is responsible for protocol development and training. The Supervisor is responsible for implementation and  activities.      Each patient with an order for respiratory treatments will receive an evaluation. All Registered Respiratory Care Practitioners (RCPs) will perform the evaluations. The same evaluation tool will be utilized for all initial and follow-up assessments. If the patient does not meet criteria for ordered therapy, the therapy will be discontinued. If the patient demonstrates an adverse response to initially ordered therapy, the therapy will be discontinued and the physician will be contacted. Specific physician's orders that deviate from protocols and are deemed \"inappropriate\" or \"unsafe\" will be addressed with ordering physician and/or medical director as required. Patients of [facility] and [address] will not be assessed for the first 24 hours as the Medical Director of [address] [place] [address] has requested that changes in therapy should not be made during that time frame. Respiratory Patient Care Assessment Protocols                           I.  Policy: In an effort to provide quality patient care and effective utilization of services, physicians who order respiratory therapy will have their patients treated via the protocols established (see attached). All Registered [facility] [address] (RCPs) will complete the initial assessment. This assessment will indicate patient needs, and the care plan developed for the patient and will performed within 24 hours of admission. Frequency of the therapy will be set according to the results of the respiratory therapy evaluation and frequency guidelines policy. Reassessment will be continued done every 48 hours and more frequently as needed for the individual patient. II. Purpose:     A. To provide a process that will allow for ongoing assessment and care plan modification for patients receiving respiratory services based on both objective and or subjective patient responses to interventions.  This process of protocol utilization will assist in patient care progression while eliminating the need for the physician to continually update respiratory therapy orders. B. To assure continuity of respiratory care that meets [facility] Clinical Practice Guidelines. III. Initiation:  Implement Respiratory Care Protocols for patients who are ordered by physician          to receive respiratory therapy procedures or for ventilator management. IV. Protocol:  A. Upon receiving an order for therapy the RCP will review the patients EMR (electronic medical record) for all pertinent information includin. Physicians order for therapy  2. Patient history and physical examination  3. Physician progress notes  4. Diagnostic. X-rays, PFTs, arterial blood gases etc.      B. The RCP will perform a respiratory assessment in the following manner:  1. Perform hand hygiene per hospital policy utilizing Standard Precautions for all patients and following transmission-based isolation as indicated per policy. 2. Identify patient via ID bracelet verifying patient name and birth date. 3. General observations: color, pattern and effort of breathing, chest expansion, (symmetrical and bilateral), level of consciousness and the ability to ambulate. 4. The RCP will assess patients cough ability and determine if Nasotracheal suctioning is needed. If patient is unable to produce sputum, at that time, the RCP should question the patient with regard to their sputum: production, color consistency, frequency and amount. 5. Auscultation: Using a stethoscope, the RCP will listen and note quality of breath sounds and presence or absence of adventitious breath sounds in all lung fields, both anteriorly and posteriorly. 6. Upon completing the EMR review and physical assessment, the RCP will document findings in the RT Assessment section of the EMR. The score level will be provided and will be used to determine the frequency of therapy.     V. Indications:   A. Indications for Bronchial Hygiene Protocol will include:  1. Potential for or presence of atelectasis. 2. Need for hydration and removal of retained secretions. 3. Need for improvement of cough effectiveness. 4. Presence of conditions associated with disorder of pulmonary clearance:  a. Cystic fibrosis  b. Bronchiectasis  B. Indications for Aerosolized Medication(s) Protocol should include:  1. Treatment of bronchospasm/wheezing  2. Improvement of mucociliary clearance  3. Treatment of stridor  4. History of Asthma or COPD             C.  Indications for Oxygen Therapy Protocol should include:  1. Documented hypoxemia  2. Severe trauma  3. Acute myocardial infarction  4. Short-term therapy (e.g. post anesthesia recovery)    VI. Maintenance:    A. Timely patient assessment is an integral part of this protocol therefore the following will be applied:  1. All non- critical care patients will be evaluated upon receiving initial respiratory care orders within 24 hours and re-evaluated within 48 hours (or more as needed). 2. Orders requesting a Respiratory Consult will be responded to in the following manner:  a. In patient emergency situations, the RCP assigned to the floor will respond immediately to the patient, provide an initial respiratory assessment, and contact the patients physician as necessary for appropriate orders. b. In non-emergent situations, the RCP assigned to the floor will respond to the patient within 90 minutes and provide an initial respiratory assessment and contact patients physician as necessary for appropriate orders. c. An RCP will provide a comprehensive assessment as soon as possible. 3. Upon completion of an evaluation, the RCP will complete documentation in the patients EMR in the RT Assessment section. 4. The RCP who completes the assessment will document orders for therapy in the orders section of the patients EMR selecting new order.  Next, per protocol should be selected indicating it is a protocol order and sign orders should be selected to complete the process. 5. The Pharmacy and Therapeutics (P&T) Committee has mandated that the medication Xopenex may be changed to unit dose albuterol without an order, except for those patients receiving Xopenex due to cardiac arrhythmias. The dosage for these patients should be 0.63 mg. and may be changed from 1.25 mg. to 0.63 mg per P & T Committee by the RCP completing the assessment. 6. Patients who are not experiencing cardiac arrhythmias, and are ordered Xopenex and Atrovent may be changed to Duoneb. VII. Safety:        A. The following safety issues shall be monitored:  1. The RCP will perform hand hygiene per hospital policy utilizing Standard Precautions for all patients and following transmission-based isolation as indicated per hospital policy. 2. The RCP must exercise professional judgment in classifying the patient for frequency of therapy. 3. Appropriate classification of the patient will require an evaluation utilizing the Therapy Assessment Protocol Guidelines. 4. The RCP will confer with the physician concerning the care of the patient at any time questions or problems arise. 5. If during therapy, the patient exhibits no improvement or deterioration in clinical status the RCP will notify the physician and the patients nurse. VIII. Interventions:   A. The patients nurse is responsible concerning all items related to his/her care. Ongoing communication with nursing is essential to successful protocol management. B. The RCP recognizes the value of the team approach in meeting the patients needs. Nursing input regarding the patients pulmonary condition will be sought as needed. IX. Reportable conditions: The RCP will inform the physician if:  1. There are acute changes in patients respiratory status. 2. The therapist is unable to determine appropriate care plan upon assessment.   3. The patient fails to reach therapeutic objective. 4. A change or additional medication is needed. X.  Patient Education:    A. Patient will receive instruction on the followin. The treatment modality, including objectives and proper technique of therapy  2. Respiratory medications  B. Documentation shall occur in the patient education section of the patients EMR. XI. Documentation: Record all findings as described above in the patients EMR. Related Protocols: A. Aerosolized Medication Protocol  B. Bronchial Hygiene  C. Oxygen Protocol   D. Volume Expansion/Secretion Clearance  E. Ventilator Weaning Protocols    References:  N   Joint Commission Consolidated [place] Standard   L    Respiratory Care Department Policy, Procedure and Protocol Guideline Manual, , MOHSEN Olivares. L  Therapist Driven Respiratory Care Protocols  A Practitioners Guide for Criteria-Based Respiratory Care by Norbert Riggins M.D., and MOHSEN Harman RRT. L  The rationale for therapist-driven protocols: an update. Respiratory Care 1998; [address] [facility] Guidelines.

## 2021-06-07 NOTE — PROGRESS NOTES
Assessment and Plan:    Problem List Items Addressed This Visit        Other    Anxiety - Primary     Much improved on increase of paxil to 20 mg once daily  No SI or HI  Recheck in Sept            Relevant Medications    PARoxetine (PAXIL) 20 mg tablet    Dyslipidemia     Lipid due in Sept                        Diagnoses and all orders for this visit:    Anxiety  -     PARoxetine (PAXIL) 20 mg tablet; TAKE ONE TABLET BY MOUTH EVERY DAY    Dyslipidemia              Subjective:      Patient ID: Trace Dockery is a 41 y o  female  CC:    Chief Complaint   Patient presents with    Follow-up       HPI:      Patient here today for a recheck as we increased her Paxil to 1 5 of the 10 mg dose the last time we saw her  After that she emailed me through the portal and we actually increased her medication to 20 mg once daily  Patient states that she is doing great on this  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  She states that she went along weekend vacation with her narcissistic sister and she states that she handles that better this year than any other year they have ever done this with her parents  Blood work is due in September  The following portions of the patient's history were reviewed and updated as appropriate: allergies, current medications, past family history, past medical history, past social history, past surgical history and problem list       Review of Systems   Constitutional: Negative  HENT: Negative  Eyes: Negative  Respiratory: Negative  Cardiovascular: Negative  Gastrointestinal: Negative  Endocrine: Negative  Genitourinary: Negative  Musculoskeletal: Negative  Skin: Negative  Allergic/Immunologic: Negative  Neurological: Negative  Hematological: Negative  Psychiatric/Behavioral: Negative            Data to review:       Objective:    Vitals:    06/07/21 1459   BP: 124/68   BP Location: Left arm Patient Position: Sitting   Pulse: 84   Weight: 89 2 kg (196 lb 9 6 oz)   Height: 5' (1 524 m)        Physical Exam  Vitals signs and nursing note reviewed  Constitutional:       Appearance: Normal appearance  She is well-developed  HENT:      Head: Normocephalic and atraumatic  Eyes:      General: Lids are normal       Conjunctiva/sclera: Conjunctivae normal       Pupils: Pupils are equal, round, and reactive to light  Cardiovascular:      Rate and Rhythm: Normal rate and regular rhythm  Heart sounds: No murmur  Pulmonary:      Effort: Pulmonary effort is normal       Breath sounds: Normal breath sounds  Skin:     General: Skin is warm and dry  Neurological:      General: No focal deficit present  Mental Status: She is alert  Coordination: Coordination is intact  Psychiatric:         Mood and Affect: Mood normal          Behavior: Behavior normal  Behavior is cooperative  Thought Content:  Thought content normal          Judgment: Judgment normal

## 2021-06-07 NOTE — PATIENT INSTRUCTIONS
Problem List Items Addressed This Visit        Other    Anxiety - Primary     Much improved on increase of paxil to 20 mg once daily  No SI or HI   Recheck in Sept           Relevant Medications    PARoxetine (PAXIL) 20 mg tablet    Dyslipidemia     Lipid due in Sept

## 2021-08-27 ENCOUNTER — APPOINTMENT (OUTPATIENT)
Dept: LAB | Facility: HOSPITAL | Age: 46
End: 2021-08-27
Payer: COMMERCIAL

## 2021-08-27 DIAGNOSIS — E78.5 DYSLIPIDEMIA: ICD-10-CM

## 2021-08-27 LAB
ALBUMIN SERPL BCP-MCNC: 3.8 G/DL (ref 3.5–5)
ALP SERPL-CCNC: 70 U/L (ref 46–116)
ALT SERPL W P-5'-P-CCNC: 36 U/L (ref 12–78)
ANION GAP SERPL CALCULATED.3IONS-SCNC: 2 MMOL/L (ref 4–13)
AST SERPL W P-5'-P-CCNC: 22 U/L (ref 5–45)
BILIRUB SERPL-MCNC: 0.78 MG/DL (ref 0.2–1)
BUN SERPL-MCNC: 10 MG/DL (ref 5–25)
CALCIUM SERPL-MCNC: 9.1 MG/DL (ref 8.3–10.1)
CHLORIDE SERPL-SCNC: 101 MMOL/L (ref 100–108)
CHOLEST SERPL-MCNC: 240 MG/DL (ref 50–200)
CO2 SERPL-SCNC: 30 MMOL/L (ref 21–32)
CREAT SERPL-MCNC: 0.81 MG/DL (ref 0.6–1.3)
GFR SERPL CREATININE-BSD FRML MDRD: 89 ML/MIN/1.73SQ M
GLUCOSE P FAST SERPL-MCNC: 89 MG/DL (ref 65–99)
HDLC SERPL-MCNC: 43 MG/DL
LDLC SERPL CALC-MCNC: 172 MG/DL (ref 0–100)
POTASSIUM SERPL-SCNC: 4.6 MMOL/L (ref 3.5–5.3)
PROT SERPL-MCNC: 7.9 G/DL (ref 6.4–8.2)
SODIUM SERPL-SCNC: 133 MMOL/L (ref 136–145)
TRIGL SERPL-MCNC: 126 MG/DL

## 2021-08-27 PROCEDURE — 80053 COMPREHEN METABOLIC PANEL: CPT

## 2021-08-27 PROCEDURE — 36415 COLL VENOUS BLD VENIPUNCTURE: CPT

## 2021-08-27 PROCEDURE — 80061 LIPID PANEL: CPT

## 2021-09-01 PROBLEM — Z12.39 BREAST CANCER SCREENING: Status: RESOLVED | Noted: 2019-01-08 | Resolved: 2021-09-01

## 2021-09-02 ENCOUNTER — OFFICE VISIT (OUTPATIENT)
Dept: FAMILY MEDICINE CLINIC | Facility: CLINIC | Age: 46
End: 2021-09-02
Payer: COMMERCIAL

## 2021-09-02 VITALS
HEIGHT: 60 IN | HEART RATE: 106 BPM | SYSTOLIC BLOOD PRESSURE: 126 MMHG | WEIGHT: 204 LBS | DIASTOLIC BLOOD PRESSURE: 80 MMHG | BODY MASS INDEX: 40.05 KG/M2 | TEMPERATURE: 98.1 F

## 2021-09-02 DIAGNOSIS — E55.9 VITAMIN D DEFICIENCY: ICD-10-CM

## 2021-09-02 DIAGNOSIS — E66.9 OBESITY (BMI 35.0-39.9 WITHOUT COMORBIDITY): ICD-10-CM

## 2021-09-02 DIAGNOSIS — E78.5 DYSLIPIDEMIA: Primary | ICD-10-CM

## 2021-09-02 DIAGNOSIS — F41.9 ANXIETY: ICD-10-CM

## 2021-09-02 PROCEDURE — 99214 OFFICE O/P EST MOD 30 MIN: CPT | Performed by: PHYSICIAN ASSISTANT

## 2021-09-02 NOTE — ASSESSMENT & PLAN NOTE
BMI continues to be elevated 39 84  Decrease in caloric intake and including bread pasta potatoes and rice

## 2021-09-02 NOTE — PROGRESS NOTES
Assessment and Plan:    Problem List Items Addressed This Visit        Other    Anxiety     Very well controlled on current paxil  NO SI or HI  Dyslipidemia - Primary      Bad LDL cholesterol is again elevated at 172 up from 81  Pt has been under a lot of stress and has not been been taking her medication  I will have her restart her med and recheck labs again in 6 months  Relevant Orders    Lipid Panel with Direct LDL reflex    Comprehensive metabolic panel    Obesity (BMI 35 0-39 9 without comorbidity)      BMI continues to be elevated 39 84  Decrease in caloric intake and including bread pasta potatoes and rice  Vitamin D deficiency       Check vitamin-D with next labs  Relevant Orders    Vitamin D 25 hydroxy                 Diagnoses and all orders for this visit:    Dyslipidemia  -     Lipid Panel with Direct LDL reflex; Future  -     Comprehensive metabolic panel; Future    Anxiety    Vitamin D deficiency  -     Vitamin D 25 hydroxy; Future    Obesity (BMI 35 0-39 9 without comorbidity)              Subjective:      Patient ID: Amadeo Guevara is a 41 y o  female  CC:    Chief Complaint   Patient presents with    Follow-up     6 month f/u -review labs - offers no complaints  HPI:      Amadeo Guevara is here for chronic conditions f/u including the diagnosis of Dyslipidemia  (primary encounter diagnosis)  Anxiety  Vitamin d deficiency  Obesity (bmi 35 0-39 9 without comorbidity)   Pt  states they are taking all medications as directed without complaints or side effects   Pt  had labs done prior to today's visit which included Recent Results (from the past 672 hour(s))  -NOVEL CORONAVIRUS (COVID-19), PCR [facility]  Collection Time: 08/09/21  3:21 PM       Result                      Value             Ref Range           SARS-CoV-2                  NOT DETECTED      NOT DETECTED   -Comprehensive metabolic panel  Collection Time: 08/27/21 11:23 AM       Result Value             Ref Range           Sodium                      133 (L)           136 - 145 mm*       Potassium                   4 6               3 5 - 5 3 mm*       Chloride                    101               100 - 108 mm*       CO2                         30                21 - 32 mmol*       ANION GAP                   2 (L)             4 - 13 mmol/L       BUN                         10                5 - 25 mg/dL        Creatinine                  0 81              0 60 - 1 30 *       Glucose, Fasting            89                65 - 99 mg/dL       Calcium                     9 1               8 3 - 10 1 m*       AST                         22                5 - 45 U/L          ALT                         36                12 - 78 U/L         Alkaline Phosphatase        70                46 - 116 U/L        Total Protein               7 9               6 4 - 8 2 g/*       Albumin                     3 8               3 5 - 5 0 g/*       Total Bilirubin             0 78              0 20 - 1 00 *       eGFR                        89                ml/min/1 73s*  -Lipid Panel with Direct LDL reflex  Collection Time: 08/27/21 11:23 AM       Result                      Value             Ref Range           Cholesterol                 240 (H)           50 - 200 mg/*       Triglycerides               126               <=150 mg/dL         HDL, Direct                 43                >=40 mg/dL          LDL Calculated              172 (H)           0 - 100 mg/dL        The following portions of the patient's history were reviewed and updated as appropriate: allergies, current medications, past family history, past medical history, past social history, past surgical history and problem list       Review of Systems   Constitutional: Negative  HENT: Negative  Eyes: Negative  Respiratory: Negative  Cardiovascular: Negative  Gastrointestinal: Negative  Endocrine: Negative  Genitourinary: Negative  Musculoskeletal: Negative  Skin: Negative  Allergic/Immunologic: Negative  Neurological: Negative  Hematological: Negative  Psychiatric/Behavioral: Negative  Data to review:       Objective:    Vitals:    09/02/21 1350   BP: 126/80   BP Location: Left arm   Patient Position: Sitting   Cuff Size: Adult   Pulse: (!) 106   Temp: 98 1 °F (36 7 °C)   TempSrc: Temporal   Weight: 92 5 kg (204 lb)   Height: 5' (1 524 m)        Physical Exam  Vitals and nursing note reviewed  Constitutional:       Appearance: Normal appearance  She is well-developed  HENT:      Head: Normocephalic and atraumatic  Eyes:      General: Lids are normal       Conjunctiva/sclera: Conjunctivae normal       Pupils: Pupils are equal, round, and reactive to light  Cardiovascular:      Rate and Rhythm: Normal rate and regular rhythm  Heart sounds: No murmur heard  Pulmonary:      Effort: Pulmonary effort is normal       Breath sounds: Normal breath sounds  Skin:     General: Skin is warm and dry  Neurological:      General: No focal deficit present  Mental Status: She is alert  Coordination: Coordination is intact  Psychiatric:         Mood and Affect: Mood normal          Behavior: Behavior normal  Behavior is cooperative  Thought Content: Thought content normal          Judgment: Judgment normal          BMI Counseling: Body mass index is 39 84 kg/m²  The BMI is above normal  Nutrition recommendations include reducing portion sizes, decreasing overall calorie intake, 3-5 servings of fruits/vegetables daily, reducing fast food intake, consuming healthier snacks, decreasing soda and/or juice intake, moderation in carbohydrate intake, increasing intake of lean protein, reducing intake of saturated fat and trans fat and reducing intake of cholesterol

## 2021-09-02 NOTE — ASSESSMENT & PLAN NOTE
Bad LDL cholesterol is again elevated at 172 up from 81  Pt has been under a lot of stress and has not been been taking her medication  I will have her restart her med and recheck labs again in 6 months

## 2021-09-02 NOTE — PATIENT INSTRUCTIONS
Problem List Items Addressed This Visit        Other    Anxiety     Very well controlled on current paxil  NO SI or HI  Dyslipidemia - Primary      Bad LDL cholesterol is again elevated at 172 up from 81  Pt has been under a lot of stress and has not been been taking her medication  I will have her restart her med and recheck labs again in 6 months  Relevant Orders    Lipid Panel with Direct LDL reflex    Comprehensive metabolic panel    Obesity (BMI 35 0-39 9 without comorbidity)      BMI continues to be elevated 39 84  Decrease in caloric intake and including bread pasta potatoes and rice  Vitamin D deficiency       Check vitamin-D with next labs  Relevant Orders    Vitamin D 25 hydroxy        Problem List Items Addressed This Visit        Other    Anxiety     Very well controlled on current paxil  NO SI or HI  Dyslipidemia - Primary      Bad LDL cholesterol is again elevated at 172 up from 81  Pt has been under a lot of stress and has not been been taking her medication  I will have her restart her med and recheck labs again in 6 months  Relevant Orders    Lipid Panel with Direct LDL reflex    Comprehensive metabolic panel    Obesity (BMI 35 0-39 9 without comorbidity)      BMI continues to be elevated 39 84  Decrease in caloric intake and including bread pasta potatoes and rice  Vitamin D deficiency       Check vitamin-D with next labs           Relevant Orders    Vitamin D 25 hydroxy

## 2021-11-11 ENCOUNTER — OFFICE VISIT (OUTPATIENT)
Dept: FAMILY MEDICINE CLINIC | Facility: CLINIC | Age: 46
End: 2021-11-11
Payer: COMMERCIAL

## 2021-11-11 VITALS
DIASTOLIC BLOOD PRESSURE: 64 MMHG | SYSTOLIC BLOOD PRESSURE: 108 MMHG | WEIGHT: 210 LBS | BODY MASS INDEX: 41.23 KG/M2 | HEART RATE: 76 BPM | OXYGEN SATURATION: 98 % | HEIGHT: 60 IN

## 2021-11-11 DIAGNOSIS — L02.213 ABSCESS OF CHEST WALL: Primary | ICD-10-CM

## 2021-11-11 PROCEDURE — 99214 OFFICE O/P EST MOD 30 MIN: CPT | Performed by: PHYSICIAN ASSISTANT

## 2021-11-11 RX ORDER — SULFAMETHOXAZOLE AND TRIMETHOPRIM 800; 160 MG/1; MG/1
1 TABLET ORAL EVERY 12 HOURS SCHEDULED
Qty: 20 TABLET | Refills: 0 | Status: SHIPPED | OUTPATIENT
Start: 2021-11-11 | End: 2021-11-22 | Stop reason: HOSPADM

## 2021-11-15 ENCOUNTER — OFFICE VISIT (OUTPATIENT)
Dept: FAMILY MEDICINE CLINIC | Facility: CLINIC | Age: 46
End: 2021-11-15
Payer: COMMERCIAL

## 2021-11-15 VITALS
WEIGHT: 213.13 LBS | DIASTOLIC BLOOD PRESSURE: 68 MMHG | BODY MASS INDEX: 41.84 KG/M2 | SYSTOLIC BLOOD PRESSURE: 118 MMHG | TEMPERATURE: 97.7 F | HEIGHT: 60 IN

## 2021-11-15 DIAGNOSIS — L02.213 ABSCESS OF CHEST WALL: Primary | ICD-10-CM

## 2021-11-15 PROCEDURE — 99213 OFFICE O/P EST LOW 20 MIN: CPT | Performed by: PHYSICIAN ASSISTANT

## 2021-11-20 ENCOUNTER — HOSPITAL ENCOUNTER (EMERGENCY)
Facility: HOSPITAL | Age: 46
Discharge: HOME | End: 2021-11-20
Attending: EMERGENCY MEDICINE | Admitting: EMERGENCY MEDICINE
Payer: COMMERCIAL

## 2021-11-20 VITALS
SYSTOLIC BLOOD PRESSURE: 134 MMHG | BODY MASS INDEX: 40.86 KG/M2 | TEMPERATURE: 98.2 F | OXYGEN SATURATION: 98 % | DIASTOLIC BLOOD PRESSURE: 57 MMHG | RESPIRATION RATE: 18 BRPM | HEART RATE: 83 BPM | WEIGHT: 209.22 LBS

## 2021-11-20 DIAGNOSIS — T78.40XA ALLERGIC REACTION, INITIAL ENCOUNTER: Primary | ICD-10-CM

## 2021-11-20 PROCEDURE — 99282 EMERGENCY DEPT VISIT SF MDM: CPT

## 2021-11-20 PROCEDURE — 99284 EMERGENCY DEPT VISIT MOD MDM: CPT | Performed by: EMERGENCY MEDICINE

## 2021-11-20 RX ORDER — PREDNISONE 20 MG/1
TABLET ORAL
Qty: 15 TABLET | Refills: 0 | Status: SHIPPED | OUTPATIENT
Start: 2021-11-20 | End: 2021-11-22 | Stop reason: HOSPADM

## 2021-11-21 ENCOUNTER — HOSPITAL ENCOUNTER (OUTPATIENT)
Facility: HOSPITAL | Age: 46
Setting detail: OBSERVATION
Discharge: HOME | End: 2021-11-22
Attending: EMERGENCY MEDICINE | Admitting: INTERNAL MEDICINE
Payer: COMMERCIAL

## 2021-11-21 DIAGNOSIS — L27.0 DRUG ERUPTION: ICD-10-CM

## 2021-11-21 DIAGNOSIS — R21 RASH IN ADULT: Primary | ICD-10-CM

## 2021-11-21 LAB
ALBUMIN SERPL BCP-MCNC: 3.8 G/DL (ref 3.5–5)
ALP SERPL-CCNC: 88 U/L (ref 46–116)
ALT SERPL W P-5'-P-CCNC: 39 U/L (ref 12–78)
ANION GAP SERPL CALCULATED.3IONS-SCNC: 10 MMOL/L (ref 4–13)
AST SERPL W P-5'-P-CCNC: 18 U/L (ref 5–45)
BASOPHILS # BLD AUTO: 0.04 THOUSANDS/ΜL (ref 0–0.1)
BASOPHILS NFR BLD AUTO: 1 % (ref 0–1)
BILIRUB SERPL-MCNC: 0.39 MG/DL (ref 0.2–1)
BUN SERPL-MCNC: 9 MG/DL (ref 5–25)
CALCIUM SERPL-MCNC: 9 MG/DL (ref 8.3–10.1)
CHLORIDE SERPL-SCNC: 103 MMOL/L (ref 100–108)
CO2 SERPL-SCNC: 26 MMOL/L (ref 21–32)
CREAT SERPL-MCNC: 0.86 MG/DL (ref 0.6–1.3)
CRP SERPL QL: 21.3 MG/L
EOSINOPHIL # BLD AUTO: 0.01 THOUSAND/ΜL (ref 0–0.61)
EOSINOPHIL NFR BLD AUTO: 0 % (ref 0–6)
ERYTHROCYTE [DISTWIDTH] IN BLOOD BY AUTOMATED COUNT: 13.2 % (ref 11.6–15.1)
ERYTHROCYTE [SEDIMENTATION RATE] IN BLOOD: 42 MM/HOUR (ref 0–19)
FLUAV RNA RESP QL NAA+PROBE: NEGATIVE
FLUBV RNA RESP QL NAA+PROBE: NEGATIVE
GFR SERPL CREATININE-BSD FRML MDRD: 82 ML/MIN/1.73SQ M
GLUCOSE SERPL-MCNC: 152 MG/DL (ref 65–140)
HCT VFR BLD AUTO: 40.1 % (ref 34.8–46.1)
HGB BLD-MCNC: 13.2 G/DL (ref 11.5–15.4)
IMM GRANULOCYTES # BLD AUTO: 0.12 THOUSAND/UL (ref 0–0.2)
IMM GRANULOCYTES NFR BLD AUTO: 1 % (ref 0–2)
LYMPHOCYTES # BLD AUTO: 1.15 THOUSANDS/ΜL (ref 0.6–4.47)
LYMPHOCYTES NFR BLD AUTO: 14 % (ref 14–44)
MCH RBC QN AUTO: 29.2 PG (ref 26.8–34.3)
MCHC RBC AUTO-ENTMCNC: 32.9 G/DL (ref 31.4–37.4)
MCV RBC AUTO: 89 FL (ref 82–98)
MONOCYTES # BLD AUTO: 0.38 THOUSAND/ΜL (ref 0.17–1.22)
MONOCYTES NFR BLD AUTO: 5 % (ref 4–12)
NEUTROPHILS # BLD AUTO: 6.58 THOUSANDS/ΜL (ref 1.85–7.62)
NEUTS SEG NFR BLD AUTO: 79 % (ref 43–75)
NRBC BLD AUTO-RTO: 0 /100 WBCS
PLATELET # BLD AUTO: 266 THOUSANDS/UL (ref 149–390)
PMV BLD AUTO: 8.9 FL (ref 8.9–12.7)
POTASSIUM SERPL-SCNC: 3.9 MMOL/L (ref 3.5–5.3)
PROT SERPL-MCNC: 8 G/DL (ref 6.4–8.2)
RBC # BLD AUTO: 4.52 MILLION/UL (ref 3.81–5.12)
RSV RNA RESP QL NAA+PROBE: NEGATIVE
SARS-COV-2 RNA RESP QL NAA+PROBE: NEGATIVE
SODIUM SERPL-SCNC: 139 MMOL/L (ref 136–145)
WBC # BLD AUTO: 8.28 THOUSAND/UL (ref 4.31–10.16)

## 2021-11-21 PROCEDURE — 99243 OFF/OP CNSLTJ NEW/EST LOW 30: CPT | Performed by: STUDENT IN AN ORGANIZED HEALTH CARE EDUCATION/TRAINING PROGRAM

## 2021-11-21 PROCEDURE — 99220 PR INITIAL OBSERVATION CARE/DAY 70 MINUTES: CPT | Performed by: INTERNAL MEDICINE

## 2021-11-21 PROCEDURE — 96375 TX/PRO/DX INJ NEW DRUG ADDON: CPT

## 2021-11-21 PROCEDURE — 85025 COMPLETE CBC W/AUTO DIFF WBC: CPT | Performed by: EMERGENCY MEDICINE

## 2021-11-21 PROCEDURE — 0241U HB NFCT DS VIR RESP RNA 4 TRGT: CPT | Performed by: INTERNAL MEDICINE

## 2021-11-21 PROCEDURE — 99284 EMERGENCY DEPT VISIT MOD MDM: CPT

## 2021-11-21 PROCEDURE — 36415 COLL VENOUS BLD VENIPUNCTURE: CPT | Performed by: EMERGENCY MEDICINE

## 2021-11-21 PROCEDURE — 85652 RBC SED RATE AUTOMATED: CPT | Performed by: EMERGENCY MEDICINE

## 2021-11-21 PROCEDURE — 99284 EMERGENCY DEPT VISIT MOD MDM: CPT | Performed by: EMERGENCY MEDICINE

## 2021-11-21 PROCEDURE — 96374 THER/PROPH/DIAG INJ IV PUSH: CPT

## 2021-11-21 PROCEDURE — 86140 C-REACTIVE PROTEIN: CPT | Performed by: EMERGENCY MEDICINE

## 2021-11-21 PROCEDURE — 80053 COMPREHEN METABOLIC PANEL: CPT | Performed by: EMERGENCY MEDICINE

## 2021-11-21 RX ORDER — DIPHENHYDRAMINE HYDROCHLORIDE 50 MG/ML
25 INJECTION INTRAMUSCULAR; INTRAVENOUS ONCE
Status: COMPLETED | OUTPATIENT
Start: 2021-11-21 | End: 2021-11-21

## 2021-11-21 RX ORDER — ACETAMINOPHEN 325 MG/1
650 TABLET ORAL EVERY 6 HOURS PRN
Status: DISCONTINUED | OUTPATIENT
Start: 2021-11-21 | End: 2021-11-22 | Stop reason: HOSPADM

## 2021-11-21 RX ORDER — METHYLPREDNISOLONE SODIUM SUCCINATE 125 MG/2ML
125 INJECTION, POWDER, LYOPHILIZED, FOR SOLUTION INTRAMUSCULAR; INTRAVENOUS ONCE
Status: COMPLETED | OUTPATIENT
Start: 2021-11-21 | End: 2021-11-21

## 2021-11-21 RX ORDER — TRIAMCINOLONE ACETONIDE 1 MG/G
CREAM TOPICAL 2 TIMES DAILY
Status: DISCONTINUED | OUTPATIENT
Start: 2021-11-21 | End: 2021-11-22 | Stop reason: HOSPADM

## 2021-11-21 RX ORDER — METHYLPREDNISOLONE SODIUM SUCCINATE 125 MG/2ML
60 INJECTION, POWDER, LYOPHILIZED, FOR SOLUTION INTRAMUSCULAR; INTRAVENOUS EVERY 12 HOURS SCHEDULED
Status: DISCONTINUED | OUTPATIENT
Start: 2021-11-22 | End: 2021-11-22 | Stop reason: HOSPADM

## 2021-11-21 RX ORDER — PAROXETINE HYDROCHLORIDE 20 MG/1
20 TABLET, FILM COATED ORAL DAILY
Status: DISCONTINUED | OUTPATIENT
Start: 2021-11-22 | End: 2021-11-22 | Stop reason: HOSPADM

## 2021-11-21 RX ORDER — DIPHENHYDRAMINE HYDROCHLORIDE 50 MG/ML
25 INJECTION INTRAMUSCULAR; INTRAVENOUS EVERY 6 HOURS PRN
Status: DISCONTINUED | OUTPATIENT
Start: 2021-11-21 | End: 2021-11-22 | Stop reason: HOSPADM

## 2021-11-21 RX ADMIN — METHYLPREDNISOLONE SODIUM SUCCINATE 125 MG: 125 INJECTION, POWDER, FOR SOLUTION INTRAMUSCULAR; INTRAVENOUS at 12:43

## 2021-11-21 RX ADMIN — DIPHENHYDRAMINE HYDROCHLORIDE 25 MG: 50 INJECTION, SOLUTION INTRAMUSCULAR; INTRAVENOUS at 12:13

## 2021-11-21 RX ADMIN — ACETAMINOPHEN 650 MG: 325 TABLET, FILM COATED ORAL at 21:02

## 2021-11-21 RX ADMIN — DIPHENHYDRAMINE HYDROCHLORIDE 25 MG: 50 INJECTION, SOLUTION INTRAMUSCULAR; INTRAVENOUS at 21:01

## 2021-11-21 RX ADMIN — TRIAMCINOLONE ACETONIDE: 1 CREAM TOPICAL at 17:33

## 2021-11-22 ENCOUNTER — TELEPHONE (OUTPATIENT)
Dept: FAMILY MEDICINE CLINIC | Facility: CLINIC | Age: 46
End: 2021-11-22

## 2021-11-22 ENCOUNTER — TRANSITIONAL CARE MANAGEMENT (OUTPATIENT)
Dept: FAMILY MEDICINE CLINIC | Facility: CLINIC | Age: 46
End: 2021-11-22

## 2021-11-22 VITALS
TEMPERATURE: 97.7 F | RESPIRATION RATE: 16 BRPM | DIASTOLIC BLOOD PRESSURE: 88 MMHG | HEART RATE: 81 BPM | SYSTOLIC BLOOD PRESSURE: 135 MMHG | OXYGEN SATURATION: 97 %

## 2021-11-22 PROCEDURE — 99217 PR OBSERVATION CARE DISCHARGE MANAGEMENT: CPT | Performed by: INTERNAL MEDICINE

## 2021-11-22 RX ORDER — LORATADINE 10 MG/1
10 TABLET ORAL DAILY
Qty: 10 TABLET | Refills: 0 | Status: SHIPPED | OUTPATIENT
Start: 2021-11-22 | End: 2022-07-15 | Stop reason: ALTCHOICE

## 2021-11-22 RX ORDER — TRIAMCINOLONE ACETONIDE 1 MG/G
CREAM TOPICAL 2 TIMES DAILY
Qty: 30 G | Refills: 0 | Status: SHIPPED | OUTPATIENT
Start: 2021-11-22

## 2021-11-22 RX ORDER — PREDNISONE 10 MG/1
TABLET ORAL DAILY
Refills: 0 | Status: CANCELLED | OUTPATIENT
Start: 2021-11-22

## 2021-11-22 RX ORDER — LORATADINE 10 MG/1
10 TABLET ORAL DAILY
Status: DISCONTINUED | OUTPATIENT
Start: 2021-11-22 | End: 2021-11-22 | Stop reason: HOSPADM

## 2021-11-22 RX ORDER — PREDNISONE 20 MG/1
40 TABLET ORAL DAILY
Qty: 14 TABLET | Refills: 0 | Status: SHIPPED | OUTPATIENT
Start: 2021-11-23 | End: 2021-11-30 | Stop reason: ALTCHOICE

## 2021-11-22 RX ADMIN — TRIAMCINOLONE ACETONIDE: 1 CREAM TOPICAL at 08:25

## 2021-11-22 RX ADMIN — DIPHENHYDRAMINE HYDROCHLORIDE 25 MG: 50 INJECTION, SOLUTION INTRAMUSCULAR; INTRAVENOUS at 06:33

## 2021-11-22 RX ADMIN — PAROXETINE HYDROCHLORIDE 20 MG: 20 TABLET, FILM COATED ORAL at 08:25

## 2021-11-22 RX ADMIN — METHYLPREDNISOLONE SODIUM SUCCINATE 60 MG: 125 INJECTION, POWDER, FOR SOLUTION INTRAMUSCULAR; INTRAVENOUS at 08:25

## 2021-11-30 ENCOUNTER — OFFICE VISIT (OUTPATIENT)
Dept: DERMATOLOGY | Facility: CLINIC | Age: 46
End: 2021-11-30
Payer: COMMERCIAL

## 2021-11-30 ENCOUNTER — OFFICE VISIT (OUTPATIENT)
Dept: FAMILY MEDICINE CLINIC | Facility: CLINIC | Age: 46
End: 2021-11-30
Payer: COMMERCIAL

## 2021-11-30 VITALS — TEMPERATURE: 98.2 F | WEIGHT: 216 LBS | BODY MASS INDEX: 42.41 KG/M2 | HEIGHT: 60 IN

## 2021-11-30 VITALS
BODY MASS INDEX: 42.41 KG/M2 | TEMPERATURE: 97.2 F | HEIGHT: 60 IN | HEART RATE: 100 BPM | DIASTOLIC BLOOD PRESSURE: 86 MMHG | SYSTOLIC BLOOD PRESSURE: 120 MMHG | WEIGHT: 216 LBS

## 2021-11-30 DIAGNOSIS — R21 MORBILLIFORM RASH: Primary | ICD-10-CM

## 2021-11-30 DIAGNOSIS — L02.213 ABSCESS OF CHEST WALL: ICD-10-CM

## 2021-11-30 DIAGNOSIS — Z13.89 SCREENING FOR SKIN CONDITION: Primary | ICD-10-CM

## 2021-11-30 DIAGNOSIS — T78.40XD ALLERGIC REACTION TO DRUG, SUBSEQUENT ENCOUNTER: ICD-10-CM

## 2021-11-30 PROBLEM — T78.40XA ALLERGIC REACTION CAUSED BY A DRUG: Status: ACTIVE | Noted: 2021-11-30

## 2021-11-30 PROCEDURE — 99495 TRANSJ CARE MGMT MOD F2F 14D: CPT | Performed by: PHYSICIAN ASSISTANT

## 2021-11-30 PROCEDURE — 3008F BODY MASS INDEX DOCD: CPT | Performed by: PHYSICIAN ASSISTANT

## 2021-11-30 PROCEDURE — 1111F DSCHRG MED/CURRENT MED MERGE: CPT | Performed by: PHYSICIAN ASSISTANT

## 2021-11-30 PROCEDURE — 99202 OFFICE O/P NEW SF 15 MIN: CPT | Performed by: DERMATOLOGY

## 2022-04-08 ENCOUNTER — OFFICE VISIT (OUTPATIENT)
Dept: FAMILY MEDICINE CLINIC | Facility: CLINIC | Age: 47
End: 2022-04-08
Payer: MEDICARE

## 2022-04-08 VITALS
DIASTOLIC BLOOD PRESSURE: 80 MMHG | HEIGHT: 60 IN | BODY MASS INDEX: 44.17 KG/M2 | HEART RATE: 76 BPM | WEIGHT: 225 LBS | SYSTOLIC BLOOD PRESSURE: 120 MMHG

## 2022-04-08 DIAGNOSIS — Z00.00 HEALTHCARE MAINTENANCE: ICD-10-CM

## 2022-04-08 DIAGNOSIS — E66.9 OBESITY (BMI 35.0-39.9 WITHOUT COMORBIDITY): ICD-10-CM

## 2022-04-08 DIAGNOSIS — Z12.31 ENCOUNTER FOR SCREENING MAMMOGRAM FOR MALIGNANT NEOPLASM OF BREAST: Primary | ICD-10-CM

## 2022-04-08 DIAGNOSIS — F41.9 ANXIETY: ICD-10-CM

## 2022-04-08 DIAGNOSIS — R63.5 ABNORMAL WEIGHT GAIN: ICD-10-CM

## 2022-04-08 DIAGNOSIS — E78.5 DYSLIPIDEMIA: ICD-10-CM

## 2022-04-08 DIAGNOSIS — E55.9 VITAMIN D DEFICIENCY: ICD-10-CM

## 2022-04-08 DIAGNOSIS — Z12.11 COLON CANCER SCREENING: ICD-10-CM

## 2022-04-08 PROBLEM — R21 MORBILLIFORM RASH: Status: RESOLVED | Noted: 2021-11-21 | Resolved: 2022-04-08

## 2022-04-08 PROBLEM — T78.40XA ALLERGIC REACTION CAUSED BY A DRUG: Status: RESOLVED | Noted: 2021-11-30 | Resolved: 2022-04-08

## 2022-04-08 PROCEDURE — 99214 OFFICE O/P EST MOD 30 MIN: CPT | Performed by: PHYSICIAN ASSISTANT

## 2022-04-08 NOTE — PROGRESS NOTES
Assessment and Plan:    Problem List Items Addressed This Visit        Other    Anxiety     Stable on Paxil  Dyslipidemia     Patient states she has intolerance to regular statin use  Off statin for about 2-3 weeks and last labs were done 8/21 so we should check new results and then determine if we can go every other day and recheck in 3-4 months  Relevant Orders    Comprehensive metabolic panel    Lipid Panel with Direct LDL reflex    Lipid Panel with Direct LDL reflex    Comprehensive metabolic panel    Obesity (BMI 35 0-39 9 without comorbidity)     PT would like to consult with [facility]  Management  Order placed  Relevant Orders    Ambulatory Referral to Weight Management    Abnormal weight gain    Relevant Orders    Ambulatory Referral to Weight Management    Vitamin D deficiency     Check vitamin-D level  Relevant Orders    Vitamin D 25 hydroxy    Healthcare maintenance     Mammo and colonoscopy ordered  Pt will go for colon screening this summer  Other Visit Diagnoses     Encounter for screening mammogram for malignant neoplasm of breast    -  Primary    Relevant Orders    Mammo screening bilateral w 3d & cad    Colon cancer screening        Relevant Orders    Ambulatory referral for colonoscopy                 Diagnoses and all orders for this visit:    Encounter for screening mammogram for malignant neoplasm of breast  -     Mammo screening bilateral w 3d & cad; Future    Dyslipidemia  -     Comprehensive metabolic panel  -     Lipid Panel with Direct LDL reflex  -     Lipid Panel with Direct LDL reflex; Future  -     Comprehensive metabolic panel; Future    Anxiety    Vitamin D deficiency  -     Vitamin D 25 hydroxy    Colon cancer screening  -     Ambulatory referral for colonoscopy; Future    Abnormal weight gain  -     Ambulatory Referral to Weight Management;  Future    Obesity (BMI 35 0-39 9 without comorbidity)  -     Ambulatory Referral to Weight Management; Future    Healthcare maintenance              Subjective:      Patient ID: Malu Membreno is a 40 y o  female  CC:    Chief Complaint   Patient presents with    Follow-up     Pt is present today for medication follow up  Per pt when she takes rosuvastatin constitently she experience joint pain       HPI:      Malu Membreno is here for chronic conditions f/u including the diagnosis of Encounter for screening mammogram for malignant neoplasm of breast  (primary encounter diagnosis)   Pt  states they are taking all medications as directed without complaints or side effects except for no statin for the past 2-3 weeks due to clark  Hip pain with its use  The following portions of the patient's history were reviewed and updated as appropriate: allergies, current medications, past family history, past medical history, past social history, past surgical history and problem list       Review of Systems   Constitutional: Negative  HENT: Negative  Eyes: Negative  Respiratory: Negative  Cardiovascular: Negative  Gastrointestinal: Negative  Endocrine: Negative  Genitourinary: Negative  Musculoskeletal: Negative  Skin: Negative  Allergic/Immunologic: Negative  Neurological: Negative  Hematological: Negative  Psychiatric/Behavioral: Negative  Data to review:       Objective:    Vitals:    04/08/22 1015   BP: 120/80   BP Location: Right arm   Patient Position: Sitting   Cuff Size: Large   Pulse: 76   Weight: 102 kg (225 lb)   Height: 5' (1 524 m)        Physical Exam  Vitals and nursing note reviewed  Constitutional:       Appearance: Normal appearance  She is well-developed  HENT:      Head: Normocephalic and atraumatic  Eyes:      General: Lids are normal       Conjunctiva/sclera: Conjunctivae normal       Pupils: Pupils are equal, round, and reactive to light  Cardiovascular:      Rate and Rhythm: Normal rate and regular rhythm        Heart sounds: No murmur heard       Pulmonary:      Effort: Pulmonary effort is normal       Breath sounds: Normal breath sounds  Skin:     General: Skin is warm and dry  Neurological:      General: No focal deficit present  Mental Status: She is alert  Coordination: Coordination is intact  Psychiatric:         Mood and Affect: Mood normal          Behavior: Behavior normal  Behavior is cooperative  Thought Content: Thought content normal          Judgment: Judgment normal            BMI Counseling: Body mass index is 43 94 kg/m²  The BMI is above normal  Nutrition recommendations include decreasing portion sizes, encouraging healthy choices of fruits and vegetables, decreasing fast food intake, consuming healthier snacks, limiting drinks that contain sugar, moderation in carbohydrate intake, increasing intake of lean protein, reducing intake of saturated and trans fat and reducing intake of cholesterol  Exercise recommendations include exercising 3-5 times per week  No pharmacotherapy was ordered  Rationale for BMI follow-up plan is due to patient being overweight or obese  Depression Screening and Follow-up Plan: Patient was screened for depression during today's encounter  They screened negative with a PHQ-2 score of 0

## 2022-04-08 NOTE — ASSESSMENT & PLAN NOTE
Patient states she has intolerance to regular statin use  Off statin for about 2-3 weeks and last labs were done 8/21 so we should check new results and then determine if we can go every other day and recheck in 3-4 months

## 2022-04-08 NOTE — PATIENT INSTRUCTIONS
Problem List Items Addressed This Visit        Other    Anxiety     Stable on Paxil  Dyslipidemia     Patient states she has intolerance to regular statin use  Off statin for about 2-3 weeks and last labs were done 8/21 so we should check new results and then determine if we can go every other day and recheck in 3-4 months  Relevant Orders    Comprehensive metabolic panel    Lipid Panel with Direct LDL reflex    Lipid Panel with Direct LDL reflex    Comprehensive metabolic panel    Obesity (BMI 35 0-39 9 without comorbidity)    Relevant Orders    Ambulatory Referral to Weight Management    Abnormal weight gain    Relevant Orders    Ambulatory Referral to Weight Management    Vitamin D deficiency     Check vitamin-D level           Relevant Orders    Vitamin D 25 hydroxy      Other Visit Diagnoses     Encounter for screening mammogram for malignant neoplasm of breast    -  Primary    Relevant Orders    Mammo screening bilateral w 3d & cad    Colon cancer screening        Relevant Orders    Ambulatory referral for colonoscopy

## 2022-04-15 ENCOUNTER — APPOINTMENT (OUTPATIENT)
Dept: LAB | Facility: HOSPITAL | Age: 47
End: 2022-04-15
Payer: MEDICARE

## 2022-04-15 DIAGNOSIS — E78.5 DYSLIPIDEMIA: ICD-10-CM

## 2022-04-15 LAB
25(OH)D3 SERPL-MCNC: 33.9 NG/ML (ref 30–100)
ALBUMIN SERPL BCP-MCNC: 3.8 G/DL (ref 3.5–5)
ALP SERPL-CCNC: 76 U/L (ref 46–116)
ALT SERPL W P-5'-P-CCNC: 44 U/L (ref 12–78)
ANION GAP SERPL CALCULATED.3IONS-SCNC: 8 MMOL/L (ref 4–13)
AST SERPL W P-5'-P-CCNC: 24 U/L (ref 5–45)
BILIRUB SERPL-MCNC: 0.81 MG/DL (ref 0.2–1)
BUN SERPL-MCNC: 9 MG/DL (ref 5–25)
CALCIUM SERPL-MCNC: 9.2 MG/DL (ref 8.3–10.1)
CHLORIDE SERPL-SCNC: 102 MMOL/L (ref 100–108)
CHOLEST SERPL-MCNC: 206 MG/DL
CO2 SERPL-SCNC: 28 MMOL/L (ref 21–32)
CREAT SERPL-MCNC: 0.91 MG/DL (ref 0.6–1.3)
GFR SERPL CREATININE-BSD FRML MDRD: 76 ML/MIN/1.73SQ M
GLUCOSE P FAST SERPL-MCNC: 97 MG/DL (ref 65–99)
HDLC SERPL-MCNC: 37 MG/DL
LDLC SERPL CALC-MCNC: 145 MG/DL (ref 0–100)
POTASSIUM SERPL-SCNC: 4.6 MMOL/L (ref 3.5–5.3)
PROT SERPL-MCNC: 8 G/DL (ref 6.4–8.2)
SODIUM SERPL-SCNC: 138 MMOL/L (ref 136–145)
TRIGL SERPL-MCNC: 122 MG/DL

## 2022-04-15 PROCEDURE — 80061 LIPID PANEL: CPT | Performed by: PHYSICIAN ASSISTANT

## 2022-04-15 PROCEDURE — 36415 COLL VENOUS BLD VENIPUNCTURE: CPT | Performed by: PHYSICIAN ASSISTANT

## 2022-04-15 PROCEDURE — 80053 COMPREHEN METABOLIC PANEL: CPT | Performed by: PHYSICIAN ASSISTANT

## 2022-04-15 PROCEDURE — 82306 VITAMIN D 25 HYDROXY: CPT | Performed by: PHYSICIAN ASSISTANT

## 2022-04-15 NOTE — RESULT ENCOUNTER NOTE
Please let the patient know that her LDL is better than it was at 172 currently at 145 however this still reflects the statin that she stopped taking 3 weeks ago and therefore I would like her to go every other day starting now and check labs in 3-4 months as discussed  Vit D is normal  Orders were placed

## 2022-04-22 ENCOUNTER — HOSPITAL ENCOUNTER (OUTPATIENT)
Dept: MAMMOGRAPHY | Facility: MEDICAL CENTER | Age: 47
Discharge: HOME | End: 2022-04-22
Payer: MEDICARE

## 2022-04-22 VITALS — WEIGHT: 225 LBS | BODY MASS INDEX: 44.17 KG/M2 | HEIGHT: 60 IN

## 2022-04-22 DIAGNOSIS — Z12.31 ENCOUNTER FOR SCREENING MAMMOGRAM FOR MALIGNANT NEOPLASM OF BREAST: ICD-10-CM

## 2022-04-22 PROCEDURE — 77067 SCR MAMMO BI INCL CAD: CPT

## 2022-04-22 PROCEDURE — 77063 BREAST TOMOSYNTHESIS BI: CPT

## 2022-05-07 ENCOUNTER — TELEPHONE (OUTPATIENT)
Dept: GASTROENTEROLOGY | Facility: MEDICAL CENTER | Age: 47
End: 2022-05-07

## 2022-05-13 ENCOUNTER — CONSULT (OUTPATIENT)
Dept: BARIATRICS | Facility: CLINIC | Age: 47
End: 2022-05-13
Payer: MEDICARE

## 2022-05-13 VITALS
HEIGHT: 61 IN | WEIGHT: 220 LBS | OXYGEN SATURATION: 99 % | RESPIRATION RATE: 16 BRPM | TEMPERATURE: 98.5 F | DIASTOLIC BLOOD PRESSURE: 72 MMHG | SYSTOLIC BLOOD PRESSURE: 120 MMHG | HEART RATE: 73 BPM | BODY MASS INDEX: 41.54 KG/M2

## 2022-05-13 DIAGNOSIS — F41.9 ANXIETY: ICD-10-CM

## 2022-05-13 DIAGNOSIS — K21.9 GASTROESOPHAGEAL REFLUX DISEASE WITHOUT ESOPHAGITIS: ICD-10-CM

## 2022-05-13 DIAGNOSIS — E66.9 OBESITY (BMI 35.0-39.9 WITHOUT COMORBIDITY): ICD-10-CM

## 2022-05-13 DIAGNOSIS — E78.5 DYSLIPIDEMIA: ICD-10-CM

## 2022-05-13 DIAGNOSIS — R63.5 ABNORMAL WEIGHT GAIN: ICD-10-CM

## 2022-05-13 DIAGNOSIS — E66.01 OBESITY, CLASS III, BMI 40-49.9 (MORBID OBESITY) (HCC): Primary | ICD-10-CM

## 2022-05-13 PROBLEM — E66.813 OBESITY, CLASS III, BMI 40-49.9 (MORBID OBESITY): Status: ACTIVE | Noted: 2019-01-18

## 2022-05-13 PROCEDURE — 99214 OFFICE O/P EST MOD 30 MIN: CPT | Performed by: NURSE PRACTITIONER

## 2022-05-13 NOTE — PROGRESS NOTES
Assessment/Plan:  Behzad Buchanan was seen today for consult  Diagnoses and all orders for this visit:    Obesity, Class III, BMI 40-49 9 (morbid obesity) (HCC)  Abnormal weight gain  - Discussed options of HealthyCORE-Intensive Lifestyle Intervention Program, Very Low Calorie Diet-VLCD, Conservative Program, Roxanne-En-Y Gastric Bypass and Vertical Sleeve Gastrectomy and the role of weight loss medications  She is not interested in weight loss surgery  - Explained the importance of making lifestyle changes first before starting any anti-obesity medications  Patient should demonstrate lifestyle changes first before anti-obesity medication can be initiated  - Patient is interested in pursuing Conservative Program  - Initial weight loss goal of 5-10% weight loss for improved health  - Weight loss can improve patient's co-morbid conditions and/or prevent weight-related complications  - Stop Brady Gey 2/8  - Discussed Paxil could contribute to weight gain  Advised not to stop medication, but encouraged to discuss with primary care  - Avoid phentermine, as this could worsen anxiety   - Not a Wellbutrin candidate, due to taking Paxil    - Consider Topamax after a trial of lifestyle modification    - Labs reviewed  CMP and lipid panel from 4/15/2022  Chol and LDL elevated and HDL low, which could all improve with weight loss  Otherwise labs within acceptable range  - Check TSH, A1C, and fasting insulin  Goals:  Do not skip meals  Food log (ie ) www myfitnesspal com,sparkpeople  com,loseit com,calorieking  com,etc  baritastic (use skinnytaste  com, dietdoctor  com or smartphone chon [employer] for recipes)  No sugary beverages  At least 64oz of water daily  Increase physical activity by 10 minutes daily   Gradually increase physical activity to a goal of 5 days per week for 30 minutes of MODERATE intensity PLUS 2 days per week of FULL BODY resistance training (use smartphone apps FitON, Home Workout, etc ) Try to increase walking to 30 minutes daily   - Log, weigh, and measure food and drink  - 0216-5434 calories per day  Sample menu given  - Discussed bundles with the dietician - she will think about it  Dyslipidemia  - Taking crestor  Continue management with prescribing provider  Anxiety  - Taking Paxil  Continue management with prescribing provider  Gastroesophageal reflux disease without esophagitis  - Diet controlled currently  Follow up in approximately 2 months with Non-Surgical Physician/Advanced Practitioner  Subjective:   Chief Complaint   Patient presents with    Consult     MWM no goal weight patient stated just healthy  S/b 2-8       Patient ID: Saran Correia  is a 40 y o  female with excess weight/obesity here to pursue weight management  Presents to the office visit with her  Julian Quispe  Previous notes and records have been reviewed      Past Medical History:   Diagnosis Date    Abnormal Pap smear of cervix     Anxiety     Breast nodule 2009    Fibroglandular changes    GERD (gastroesophageal reflux disease)     Hyperlipidemia     Migraine with aura and without status migrainosus, not intractable     Morbid obesity with BMI of 40 0-44 9, adult (HCC)     Peptic ulcer      Past Surgical History:   Procedure Laterality Date    COLONOSCOPY      ORTHODONTIC TREATMENT      16 yo; incisors pulled down    IL ANAL SPHINCTEROTOMY N/A 3/20/2020    Procedure: LEFT PARTIAL SPHINCTEROTOMY; AND FISSURECTOMY;  Surgeon: Anel Hammond MD;  Location: [address] [place] [address];  Service: Colorectal    WISDOM TOOTH EXTRACTION         HPI:  Wt Readings from Last 20 Encounters:   05/13/22 99 8 kg (220 lb)   04/22/22 102 kg (225 lb)   04/08/22 102 kg (225 lb)   11/30/21 98 kg (216 lb)   11/30/21 98 kg (216 lb)   11/20/21 94 9 kg (209 lb 3 5 oz)   11/15/21 96 7 kg (213 lb 2 oz)   11/11/21 95 3 kg (210 lb)   09/02/21 92 5 kg (204 lb)   06/07/21 89 2 kg (196 lb 9 6 oz)   03/30/21 90 7 kg (200 lb)   02/16/21 92 5 kg (204 lb)   21 93 9 kg (207 lb)   20 102 kg (225 lb)   20 96 6 kg (213 lb)   19 94 1 kg (207 lb 6 4 oz)   19 94 8 kg (209 lb)   19 88 2 kg (194 lb 6 4 oz)   19 92 7 kg (204 lb 6 4 oz)   19 92 5 kg (204 lb)     Obesity/Excess Weight:  Severity: Severe  Onset:  Since childhood    Modifiers: Diet and Exercise and Commercial Weight Loss Programs-ie  Weight Watchers, Marleta Nova, Nutrisystem, etc   Contributing factors: Stress/Emotional Eating  Associated symptoms: fatigue, increased joint pain and increased shortness of breath    Hydration: 8 5 glasses of water per day, 4 cups decaf green tea - black  Alcohol: 1-2 drinks per month  Smoking: denies  Exercise: walking daily 20 minutes, apple fitness yoga and weight training 3 times per week  Occupation: OT in schools   Sleep: 7 hours  STOP ban/8  Had sleep study around  negative for sleep apnea    Highest weight: 238-240 lbs prior to COVID pandemic   Current weight: 220 lbs  Goal weight: healthy weight    Colonoscopy: ordered by another provider - she will be calling to schedule   Mammogram: UTD - had 2022    The following portions of the patient's history were reviewed and updated as appropriate: allergies, current medications, past family history, past medical history, past social history, past surgical history, and problem list     Review of Systems   HENT: Positive for sore throat (due to GERD)  Respiratory: Negative for cough and shortness of breath  Cardiovascular: Negative for chest pain and palpitations  Gastrointestinal: Positive for constipation, diarrhea and nausea (intermittent)  Negative for vomiting         + GERD - diet controlled   Endocrine: Negative for cold intolerance and heat intolerance  Genitourinary: Negative for dysuria  Musculoskeletal: Positive for arthralgias (knee) and back pain (lower)  Skin: Negative for rash     Neurological: Positive for headaches (migraine - pcp aware)  Psychiatric/Behavioral: Negative for suicidal ideas (or HI)  + depression - somewhat controlled  + anxiety - controlled with medication       Objective:  /72 (BP Location: Left arm, Patient Position: Sitting, Cuff Size: Standard)   Pulse 73   Temp 98 5 °F (36 9 °C) (Tympanic)   Resp 16   Ht 5' 0 9" (1 547 m)   Wt 99 8 kg (220 lb)   LMP 04/19/2022 (Exact Date)   SpO2 99%   BMI 41 71 kg/m²     Physical Exam  Vitals and nursing note reviewed  Constitutional   General appearance: Abnormal   well developed and morbidly obese  Eyes No conjunctival injection  Ears, Nose, Mouth, and Throat Oral mucosa moist    Pulmonary   Respiratory effort: No increased work of breathing or signs of respiratory distress  Cardiovascular    Examination of extremities for edema and/or varicosities: Normal   no edema  Abdomen   Abdomen: Abnormal   The abdomen was obese  Musculoskeletal   Gait and station: Normal     Psychiatric   Orientation to person, place and time: Normal     Affect: appropriate      Labs  Recent labs have been personally reviewed      Lab Results   Component Value Date     05/12/2015    SODIUM 138 04/15/2022    K 4 6 04/15/2022     04/15/2022    CO2 28 04/15/2022    ANIONGAP 22 (H) 05/12/2015    AGAP 8 04/15/2022    BUN 9 04/15/2022    CREATININE 0 91 04/15/2022    GLUC 152 (H) 11/21/2021    GLUF 97 04/15/2022    CALCIUM 9 2 04/15/2022    AST 24 04/15/2022    ALT 44 04/15/2022    ALKPHOS 76 04/15/2022    TP 8 0 04/15/2022    TBILI 0 81 04/15/2022    EGFR 76 04/15/2022     Lab Results   Component Value Date    CHOLESTEROL 206 (H) 04/15/2022     Lab Results   Component Value Date    HDL 37 (L) 04/15/2022     Lab Results   Component Value Date    TRIG 122 04/15/2022     Lab Results   Component Value Date    LDLCALC 145 (H) 04/15/2022

## 2022-05-20 ENCOUNTER — APPOINTMENT (OUTPATIENT)
Dept: LAB | Facility: HOSPITAL | Age: 47
End: 2022-05-20
Payer: MEDICARE

## 2022-05-20 DIAGNOSIS — E66.01 OBESITY, CLASS III, BMI 40-49.9 (MORBID OBESITY) (HCC): ICD-10-CM

## 2022-05-20 LAB
EST. AVERAGE GLUCOSE BLD GHB EST-MCNC: 105 MG/DL
HBA1C MFR BLD: 5.3 %
INSULIN SERPL-ACNC: 14.8 MU/L (ref 3–25)
TSH SERPL DL<=0.05 MIU/L-ACNC: 2.52 UIU/ML (ref 0.45–4.5)

## 2022-05-20 PROCEDURE — 83525 ASSAY OF INSULIN: CPT

## 2022-05-20 PROCEDURE — 84443 ASSAY THYROID STIM HORMONE: CPT

## 2022-05-20 PROCEDURE — 83036 HEMOGLOBIN GLYCOSYLATED A1C: CPT

## 2022-05-20 PROCEDURE — 36415 COLL VENOUS BLD VENIPUNCTURE: CPT

## 2022-06-23 NOTE — PROGRESS NOTES
Weight Management Medical Nutrition Assessment  Kalpana Chin presented for a meal planning session  Today's weight is 214 8#  Per dietary recall patient consumes excess calories from larger portions at mealtimes and snacking on sweets in the evening  Completed a body composition using SECA scale and reviewed results with patient  Reevue indirect calorimter revealed REE is fast ( 12% faster) compared to the predictive normal for someone her same age, height, and gender  Tracking using noom 1187-3192 calories   Developed and reviewed a low calorie meal plan  Patient seen by Medical Provider in past 6 months:  yes  Requested to schedule appointment with Medical Provider: No      Anthropometric Measurements  Start Weight (#): 214 8#  Current Weight (#): n/a  TBW % Change from start weight:n/a  Ideal Body Weight (#):100#  Goal Weight (#):ST#  LT#    Weight Loss History  Previous weight loss attempts: Commercial Programs (Weight Watchers, Vera Carwin, etc )  Counseling with  MD  Self Created Diets (Portion Control, Healthy Food Choices, etc )    Food and Nutrition Related History  OT in school   Food Recall  Breakfast:Frozen yogurt (1/2 cup) with protein powder   Snack:skip  Lunch:skip during school year  Now-leftovers   Snack:protein bar   Dinner:stir dinero - steak salad   Snack:ice cream / frozen yogurt / popcorn       Beverages: water  Volume of beverage intake: 80-100oz    Weekends: Worse, more crazing on wkd   Cravings: sweets / ice cream   Trouble area of day:    Frequency of Eating out: irregularly  Food restrictions:none reported  Cooking: self   Food Shopping: self    Physical Activity Intake  Activity:Walking/Yoga/ 30 minutes   Frequency:frequently   Physical limitations/barriers to exercise: none reported     Estimated Needs  Energy  SECA: [id number]     X 1 3 -1000 = 1129 calories   Bear Cristiane Energy Needs:  BMR : 1541 calories   1-2# loss weekly sedentary:  849-1349 calories              1-2# loss weekly lightly active:7307-4590 calories   Maintenance calories for sedentary activity level: 1849 calories   Reevue Indirect Calorimeter REE:   1858 caloris          Weight loss without exercise:   1295 calories         Weight loss with exercise:4642-4066  Maintenance: 7154-1571 calories             Protein:55-68gm      (1 2-1 5g/kg IBW)  Fluid: 53oz     (35mL/kg IBW)    Nutrition Diagnosis  Yes;     Overweight/obesity  related to Excess energy intake as evidenced by  BMI more than normative standard for age and sex (obesity-grade III 36+)       Nutrition Intervention    Nutrition Prescription  Calories:5450-8455 calories and flex to 1600 calories on cardio days  Protein:55-70gm  Fluid:50-60oz    Meal Plan ([facility]/Pro/Carb)  Breakfast: 200-300/20/30  Snack:  Lunch: 300/30/30-45  Snack: 150/>5/20  Dinner: 300/30/30-45  Snack: 150/>5/20      Nutrition Education:      Calorie controlled menu  Lean protein food choices  Healthy snack options  Food journaling tips      Nutrition Counseling:  Strategies: meal planning, portion sizes, healthy snack choices, hydration, fiber intake, protein intake, exercise, food journal      Monitoring and Evaluation:  Evaluation criteria:  Energy Intake  Meet protein needs  Maintain adequate hydration  Monitor weekly weight  Meal planning/preparation  Food journal   Decreased portions at mealtimes and snacks  Physical activity     Barriers to learning:none  Readiness to change: Preparation:  (Getting ready to change)   Comprehension: good  Expected Compliance: good

## 2022-06-24 ENCOUNTER — OFFICE VISIT (OUTPATIENT)
Dept: BARIATRICS | Facility: CLINIC | Age: 47
End: 2022-06-24

## 2022-06-24 VITALS — HEIGHT: 60 IN | BODY MASS INDEX: 42.18 KG/M2 | WEIGHT: 214.84 LBS

## 2022-06-24 DIAGNOSIS — R63.5 ABNORMAL WEIGHT GAIN: ICD-10-CM

## 2022-06-24 PROCEDURE — RECHECK: Performed by: DIETITIAN, REGISTERED

## 2022-06-24 PROCEDURE — BODSTAT PR BODY STAT: Performed by: DIETITIAN, REGISTERED

## 2022-07-15 ENCOUNTER — OFFICE VISIT (OUTPATIENT)
Dept: BARIATRICS | Facility: CLINIC | Age: 47
End: 2022-07-15
Payer: MEDICARE

## 2022-07-15 VITALS
WEIGHT: 211.7 LBS | SYSTOLIC BLOOD PRESSURE: 124 MMHG | RESPIRATION RATE: 16 BRPM | DIASTOLIC BLOOD PRESSURE: 80 MMHG | BODY MASS INDEX: 39.97 KG/M2 | HEIGHT: 61 IN | HEART RATE: 82 BPM

## 2022-07-15 DIAGNOSIS — E78.5 DYSLIPIDEMIA: ICD-10-CM

## 2022-07-15 DIAGNOSIS — E66.01 OBESITY, CLASS III, BMI 40-49.9 (MORBID OBESITY) (HCC): Primary | ICD-10-CM

## 2022-07-15 DIAGNOSIS — F41.9 ANXIETY: ICD-10-CM

## 2022-07-15 PROCEDURE — 99214 OFFICE O/P EST MOD 30 MIN: CPT | Performed by: NURSE PRACTITIONER

## 2022-07-15 NOTE — PATIENT INSTRUCTIONS
Weight loss medications: Qsymia (combo of phentermine and Topamax), Phentermine, Topamax, Wellbutrin, Naltrexone, and Metformin

## 2022-07-15 NOTE — PROGRESS NOTES
Assessment/Plan:     Obesity, Class III, BMI 40-49 9 (morbid obesity) ([address] )  - Patient is pursuing Conservative Program with bundles with the dietician   - Initial weight loss goal of 5-10% weight loss for improved health  - labs reviewed: fasting insulin A1C, and TSH 5/20/2022 and all within normal limits  - She is interested in weight loss medication  Discussed phentermine, Topamax, Wellbutrin, naltrexone, and Metformin    - She will take some time to think about the medications  - Denies history of seizures, glaucoma, or kidney stones  - Currently on paroxetine and could consider changing that to Wellbutrin, but will defer to prescribing provider  Initial: 220 lbs  Current: 211 7 lbs  Change: -8 3 lbs  Goal: 180 lbs    Goals:  Do not skip meals  Food log (ie ) www myfitnesspal com,sparkpeople  com,loseit com,calorieking  com,etc  baritastic (use skinnytaste  com, dietdoctor  com or smartphone chon [employer] for recipes)  No sugary beverages  At least 64oz of water daily  Increase physical activity by 10 minutes daily  Gradually increase physical activity to a goal of 5 days per week for 30 minutes of MODERATE intensity PLUS 2 days per week of FULL BODY resistance training (use smartphone apps VONTRAVEL, Home Workout, etc )  - Continue food logging, weighing and measuring    - Keep up the great work with exercise  - Keep up the great water intake  - 6799-3326 calories, flex to 1600 calories on cardio days  Dyslipidemia  - Taking crestor  May improve with weight loss and lifestyle modification  Continue management with prescribing provider  Anxiety  - Taking paroxetine  Continue management with prescribing provider  Christo Cohen was seen today for follow-up      Diagnoses and all orders for this visit:    Obesity, Class III, BMI 40-49 9 (morbid obesity) ([facility])    Dyslipidemia    Anxiety          Follow up in approximately 2 months with Non-Surgical Physician/Advanced Practitioner  Subjective:   Chief Complaint   Patient presents with    Follow-up     MWM 2mth f/u; waist 47in       Patient ID: Ltanya Marianna  is a 40 y o  female with excess weight/obesity here to pursue weight management  Patient is pursuing Conservative Program with bundles with the dietician  Presents to the office visit accompanied by her   Most recent notes and records were reviewed  HPI    Wt Readings from Last 10 Encounters:   07/15/22 96 kg (211 lb 11 2 oz)   06/24/22 97 5 kg (214 lb 13 4 oz)   05/13/22 99 8 kg (220 lb)   04/22/22 102 kg (225 lb)   04/08/22 102 kg (225 lb)   11/30/21 98 kg (216 lb)   11/30/21 98 kg (216 lb)   11/20/21 94 9 kg (209 lb 3 5 oz)   11/15/21 96 7 kg (213 lb 2 oz)   11/11/21 95 3 kg (210 lb)     Food logging: yes, weighing and measuring food as well  Staying around 1200 calories on non-active days and more active days around 1400  B- greek yogurt and protein shake and fruit  S- none or string cheese or fruit or protein bar  L- leftovers or salad with protein sometimes FF dressing  S- string cheese or protein bar  D- protein and veggies and 1/2 cup starch   S- none or string cheese or fruit or protein bar or frozen yogurt    Hydration- 5 - 22 oz water daily  Alcohol- none  Exercise- walks daily 40 minutes, and apple fitness - stationary bike 3 times per week for 30-45 minutes    Colonoscopy: has order, encouraged to schedule  Mammogram: UTD, due April 2023  The following portions of the patient's history were reviewed and updated as appropriate: allergies, current medications, past family history, past medical history, past social history, past surgical history, and problem list     Review of Systems   HENT: Negative for sore throat  Respiratory: Negative for cough and shortness of breath  Cardiovascular: Negative for chest pain and palpitations     Gastrointestinal: Positive for diarrhea (slight, advised to follow-up with primary care if it does not improve)  Negative for abdominal pain, constipation, nausea and vomiting  Denies GERD   Skin: Negative for rash  Psychiatric/Behavioral: Negative for suicidal ideas (or HI)  Denies depression and anxiety       Objective:  /80   Pulse 82   Resp 16   Ht 5' 0 5" (1 537 m)   Wt 96 kg (211 lb 11 2 oz)   Breastfeeding No   BMI 40 66 kg/m²     Physical Exam  Vitals and nursing note reviewed  Constitutional   General appearance: Abnormal   well developed and morbidly obese  Eyes No conjunctival injection  Ears, Nose, Mouth, and Throat Oral mucosa moist    Pulmonary   Respiratory effort: No increased work of breathing or signs of respiratory distress  Cardiovascular    Examination of extremities for edema and/or varicosities: Normal   no edema  Abdomen   Abdomen: Abnormal   The abdomen was obese      Musculoskeletal   Gait and station: Normal     Psychiatric   Orientation to person, place and time: Normal     Affect: appropriate See HPI ATU sono-vtx; 6#3; TIMA-15; Nl dopplers; 8/8 BPP

## 2022-07-15 NOTE — ASSESSMENT & PLAN NOTE
- Taking crestor  May improve with weight loss and lifestyle modification  Continue management with prescribing provider

## 2022-07-22 ENCOUNTER — TELEPHONE (OUTPATIENT)
Dept: GASTROENTEROLOGY | Facility: CLINIC | Age: 47
End: 2022-07-22

## 2022-07-22 DIAGNOSIS — Z12.11 SPECIAL SCREENING FOR MALIGNANT NEOPLASMS, COLON: Primary | ICD-10-CM

## 2022-07-22 NOTE — TELEPHONE ENCOUNTER
Scheduled date of colonoscopy (as of today):10 13 22  Physician performing colonoscopy:DR ROBISON  Location of colonoscopy:  Bowel prep reviewed with patient:SUELLEN  Instructions reviewed with patient by:JAYE VERBALLY/MAILED  Clearances: N/A    07/22/22  Screened by: Devonte Damian    Referring Provider Dr Meche Vaughn    Pre- Screening: Body mass index is 40 66 kg/m²  Has patient been referred for a routine screening Colonoscopy? yes  Is the patient between 39-70 years old? yes      Previous Colonoscopy yes   If yes:    Date: 15 yrs ago    Facility:     Reason:       SCHEDULING STAFF: If the patient is between 45yrs-49yrs, please advise patient to confirm benefits/coverage with their insurance company for a routine screening colonoscopy, some insurance carriers will only cover at [phone] or older  If the patient is over 66years old, please schedule an office visit  Does the patient want to see a Gastroenterologist prior to their procedure OR are they having any GI symptoms? no    Has the patient been hospitalized or had abdominal surgery in the past 6 months? no    Does the patient use supplemental oxygen? no    Does the patient take Coumadin, Lovenox, Plavix, Elliquis, Xarelto, or other blood thinning medication? no    Has the patient had a stroke, cardiac event, or stent placed in the past year? no     PT PASSED OA  PT CAN BE REACHED [phone]  SCHEDULING STAFF: If patient answers NO to above questions, then schedule procedure  If patient answers YES to above questions, then schedule office appointment  If patient is between 45yrs - 49yrs, please advise patient that we will have to confirm benefits & coverage with their insurance company for a routine screening colonoscopy

## 2022-07-27 ENCOUNTER — OFFICE VISIT (OUTPATIENT)
Dept: OBGYN CLINIC | Facility: CLINIC | Age: 47
End: 2022-07-27
Payer: MEDICARE

## 2022-07-27 VITALS
DIASTOLIC BLOOD PRESSURE: 78 MMHG | BODY MASS INDEX: 41.54 KG/M2 | HEIGHT: 60 IN | SYSTOLIC BLOOD PRESSURE: 114 MMHG | WEIGHT: 211.6 LBS

## 2022-07-27 DIAGNOSIS — Z01.411 ENCOUNTER FOR GYNECOLOGICAL EXAMINATION WITH ABNORMAL FINDING: Primary | ICD-10-CM

## 2022-07-27 DIAGNOSIS — N93.9 ABNORMAL BLEEDING IN MENSTRUAL CYCLE: ICD-10-CM

## 2022-07-27 DIAGNOSIS — Z12.31 ENCOUNTER FOR SCREENING MAMMOGRAM FOR BREAST CANCER: ICD-10-CM

## 2022-07-27 DIAGNOSIS — N81.10 VAGINAL PROLAPSE: ICD-10-CM

## 2022-07-27 LAB — SL AMB POCT URINE HCG: NEGATIVE

## 2022-07-27 PROCEDURE — 81025 URINE PREGNANCY TEST: CPT | Performed by: NURSE PRACTITIONER

## 2022-07-27 PROCEDURE — 99396 PREV VISIT EST AGE 40-64: CPT | Performed by: NURSE PRACTITIONER

## 2022-07-27 RX ORDER — MEDROXYPROGESTERONE ACETATE 10 MG/1
10 TABLET ORAL DAILY
Qty: 10 TABLET | Refills: 0 | Status: SHIPPED | OUTPATIENT
Start: 2022-07-27 | End: 2022-09-21 | Stop reason: ALTCHOICE

## 2022-07-27 NOTE — PROGRESS NOTES
Assessment / Plan    1  Encounter for gynecological examination with abnormal finding  Well woman exam    2  Encounter for screening mammogram for breast cancer  Order provided for next year    - Mammo screening bilateral w 3d & cad; Future    3  Abnormal bleeding in menstrual cycle  Check pelvic US, RV for problem focused visit  Discussed probable ebx with patient as well  UPT negative  RX provera 10 x 10 since she is still bleeding     - US pelvis complete w transvaginal; Future  - medroxyPROGESTERone (PROVERA) 10 mg tablet; Take 1 tablet (10 mg total) by mouth daily  Dispense: 10 tablet; Refill: 0    4  Vaginal prolapse  Will evaluate further at her next visit  Bertha Herrera is a 40 y o  female who presents for her annual gynecologic exam      39 yo  presents for routine GYN exam   She reports having an abnormal period and concern about vaginal prolapse  2021 pap HPV negative  Hx of abnormal paps  STD hx negative  2022 mammo negative  BC: condoms or rhythm  Pertinent hx: migraine with aura, HTN, BMI 41    Currently got her period , still bleeding-- progressed like a normal period, although cramping has been worse; then lighter but daily since that time, changing once daily  Also reports some pressure/buldge at opening of vagina when trying to pass bowels  Also feels like she has to shift to completely empty her bladder  Generally pain and pressure in her pelvis, lower back  Periods are irregular just this current cycle  Current contraception: condoms  History of abnormal Pap smear: yes -   Family history of breast,uterine, ovarian or colon cancer: yes - mat aunt breast ca, < 48, not sure if she had genetic testing        Menstrual History:  OB History        2    Para   2    Term   2       0    AB   0    Living   2       SAB        IAB        Ectopic        Multiple        Live Births               Obstetric Comments   2 vaginal  Menarche 12  Cycles Q 26-28 days, x 4-5d, heavy x 1 day then lightens  At heaviest changes pad 2-3 times a day  Patient's last menstrual period was 07/15/2022  The following portions of the patient's history were reviewed and updated as appropriate: allergies, current medications, past family history, past medical history, past social history, past surgical history and problem list     Review of Systems      Review of Systems   Constitutional: Negative for chills and fever  Respiratory: Negative for cough and shortness of breath  Gastrointestinal: Negative for abdominal distention, abdominal pain, blood in stool, constipation, diarrhea, nausea and vomiting  Genitourinary: Positive for menstrual problem and vaginal bleeding  Negative for difficulty urinating, dysuria, frequency, genital sores, hematuria, pelvic pain, urgency and vaginal discharge  Musculoskeletal: Negative for arthralgias and myalgias  Breasts:  Negative for skin changes, dimpling, asymmetry, nipple discharge, redness, tenderness or palpable masses    Objective      /78 (BP Location: Right arm, Patient Position: Sitting, Cuff Size: Large)   Ht 5' (1 524 m)   Wt 96 kg (211 lb 9 6 oz)   LMP 07/15/2022   BMI 41 33 kg/m²   Physical Exam  Constitutional:       General: She is not in acute distress  Appearance: Normal appearance  She is well-developed  She is obese  She is not ill-appearing or diaphoretic  HENT:      Head: Normocephalic and atraumatic  Eyes:      Pupils: Pupils are equal, round, and reactive to light  Neck:      Thyroid: No thyromegaly  Pulmonary:      Effort: Pulmonary effort is normal    Chest:   Breasts: Breasts are symmetrical       Right: No inverted nipple, mass, nipple discharge, skin change, tenderness or supraclavicular adenopathy  Left: No inverted nipple, mass, nipple discharge, skin change, tenderness or supraclavicular adenopathy         Abdominal:      General: There is no distension  Palpations: Abdomen is soft  There is no mass  Tenderness: There is no abdominal tenderness  There is no guarding or rebound  Genitourinary:     General: Normal vulva  Exam position: Lithotomy position  Labia:         Right: No rash, tenderness, lesion or injury  Left: No rash, tenderness, lesion or injury  Vagina: No signs of injury and foreign body  Bleeding (small amount, menstrual) present  No vaginal discharge, erythema or tenderness  Cervix: No cervical motion tenderness, discharge or friability  Uterus: Not enlarged and not tender  Adnexa:         Right: No mass or tenderness  Left: No mass or tenderness  Musculoskeletal:      Cervical back: Neck supple  Lymphadenopathy:      Cervical: No cervical adenopathy  Upper Body:      Right upper body: No supraclavicular adenopathy  Left upper body: No supraclavicular adenopathy  Skin:     General: Skin is warm and dry  Neurological:      General: No focal deficit present  Mental Status: She is alert and oriented to person, place, and time  Psychiatric:         Mood and Affect: Mood normal          Behavior: Behavior normal          Thought Content:  Thought content normal          Judgment: Judgment normal

## 2022-08-03 ENCOUNTER — HOSPITAL ENCOUNTER (OUTPATIENT)
Dept: ULTRASOUND IMAGING | Facility: HOSPITAL | Age: 47
Discharge: HOME | End: 2022-08-03
Attending: NURSE PRACTITIONER
Payer: MEDICARE

## 2022-08-03 DIAGNOSIS — N93.9 ABNORMAL BLEEDING IN MENSTRUAL CYCLE: ICD-10-CM

## 2022-08-03 PROCEDURE — 76856 US EXAM PELVIC COMPLETE: CPT

## 2022-08-03 PROCEDURE — 76830 TRANSVAGINAL US NON-OB: CPT

## 2022-08-11 NOTE — RESULT ENCOUNTER NOTE
39 yo w/ AUB-- pelvic US uterus 9 6 x 3 8 x 4 7 cm; normal shape/size  Eml 11 mm  Rt ovary 2 7 simple follicle    Patient has an appointment with me on 8/31/22

## 2022-08-31 ENCOUNTER — OFFICE VISIT (OUTPATIENT)
Dept: BARIATRICS | Facility: CLINIC | Age: 47
End: 2022-08-31

## 2022-08-31 ENCOUNTER — OFFICE VISIT (OUTPATIENT)
Dept: OBGYN CLINIC | Facility: CLINIC | Age: 47
End: 2022-08-31
Payer: MEDICARE

## 2022-08-31 VITALS — HEIGHT: 60 IN | WEIGHT: 202.72 LBS | BODY MASS INDEX: 39.8 KG/M2

## 2022-08-31 VITALS
DIASTOLIC BLOOD PRESSURE: 78 MMHG | SYSTOLIC BLOOD PRESSURE: 112 MMHG | HEIGHT: 60 IN | WEIGHT: 205.2 LBS | BODY MASS INDEX: 40.29 KG/M2

## 2022-08-31 DIAGNOSIS — R63.5 ABNORMAL WEIGHT GAIN: ICD-10-CM

## 2022-08-31 DIAGNOSIS — N93.9 ABNORMAL UTERINE BLEEDING (AUB): Primary | ICD-10-CM

## 2022-08-31 LAB — SL AMB POCT URINE HCG: NEGATIVE

## 2022-08-31 PROCEDURE — 81025 URINE PREGNANCY TEST: CPT | Performed by: NURSE PRACTITIONER

## 2022-08-31 PROCEDURE — RECHECK: Performed by: DIETITIAN, REGISTERED

## 2022-08-31 PROCEDURE — 88305 TISSUE EXAM BY PATHOLOGIST: CPT | Performed by: PATHOLOGY

## 2022-08-31 PROCEDURE — WEIGHT: Performed by: DIETITIAN, REGISTERED

## 2022-08-31 NOTE — PROGRESS NOTES
41 yo w/ AUB-- pelvic US uterus 9 6 x 3 8 x 4 7 cm; normal shape/size  Eml 11 mm  Rt ovary 2 7 simple follicle     had four weeks of bleeding in July; given provera 10 x 10d  Was still bleeding while using provera, but when finished the bleeding stopped  Endometrial biopsy    Date/Time: 8/31/2022 2:36 PM  Performed by: SHERIDAN Pineda  Authorized by: SHERIDAN Pineda   Universal Protocol:  Consent: Written consent obtained  Risks and benefits: risks, benefits and alternatives were discussed  Consent given by: patient  Patient understanding: patient states understanding of the procedure being performed  Patient consent: the patient's understanding of the procedure matches consent given  Procedure consent: procedure consent matches procedure scheduled  Patient identity confirmed: verbally with patient      Indication:     Indications: Other disorder of menstruation and other abnormal bleeding from female genital tract    Procedure:     Procedure: endometrial biopsy with Pipelle      A bivalve speculum was placed in the vagina: yes      Cervix cleaned and prepped: yes      A paracervical block was performed: no      An intracervical block was performed: no      The cervix was dilated: no      Uterus sounded: yes      Uterus sound depth (cm):  9    Specimen collected: specimen collected and sent to pathology      Patient tolerated procedure well with no complications: yes    Comments:     Procedure comments:  Post ebx instructions provided  She chooses to observe menstrual patterns for now  If they continue to be too frequent, elongated or too heavy she will follow up for further management

## 2022-09-15 ENCOUNTER — PATIENT MESSAGE (OUTPATIENT)
Dept: OBGYN CLINIC | Facility: CLINIC | Age: 47
End: 2022-09-15

## 2022-09-16 ENCOUNTER — APPOINTMENT (OUTPATIENT)
Dept: LAB | Facility: HOSPITAL | Age: 47
End: 2022-09-16
Payer: MEDICARE

## 2022-09-16 DIAGNOSIS — E78.5 DYSLIPIDEMIA: ICD-10-CM

## 2022-09-16 LAB
ALBUMIN SERPL BCP-MCNC: 3.6 G/DL (ref 3.5–5)
ALP SERPL-CCNC: 71 U/L (ref 46–116)
ALT SERPL W P-5'-P-CCNC: 32 U/L (ref 12–78)
ANION GAP SERPL CALCULATED.3IONS-SCNC: 7 MMOL/L (ref 4–13)
AST SERPL W P-5'-P-CCNC: 17 U/L (ref 5–45)
BILIRUB SERPL-MCNC: 0.63 MG/DL (ref 0.2–1)
BUN SERPL-MCNC: 15 MG/DL (ref 5–25)
CALCIUM SERPL-MCNC: 8.8 MG/DL (ref 8.3–10.1)
CHLORIDE SERPL-SCNC: 102 MMOL/L (ref 96–108)
CHOLEST SERPL-MCNC: 191 MG/DL
CO2 SERPL-SCNC: 30 MMOL/L (ref 21–32)
CREAT SERPL-MCNC: 0.82 MG/DL (ref 0.6–1.3)
GFR SERPL CREATININE-BSD FRML MDRD: 86 ML/MIN/1.73SQ M
GLUCOSE P FAST SERPL-MCNC: 97 MG/DL (ref 65–99)
HDLC SERPL-MCNC: 41 MG/DL
LDLC SERPL CALC-MCNC: 131 MG/DL (ref 0–100)
POTASSIUM SERPL-SCNC: 4 MMOL/L (ref 3.5–5.3)
PROT SERPL-MCNC: 7.7 G/DL (ref 6.4–8.4)
SODIUM SERPL-SCNC: 139 MMOL/L (ref 135–147)
TRIGL SERPL-MCNC: 93 MG/DL

## 2022-09-21 ENCOUNTER — OFFICE VISIT (OUTPATIENT)
Dept: FAMILY MEDICINE CLINIC | Facility: CLINIC | Age: 47
End: 2022-09-21
Payer: MEDICARE

## 2022-09-21 ENCOUNTER — TELEPHONE (OUTPATIENT)
Dept: GASTROENTEROLOGY | Facility: CLINIC | Age: 47
End: 2022-09-21

## 2022-09-21 VITALS
SYSTOLIC BLOOD PRESSURE: 116 MMHG | HEART RATE: 96 BPM | DIASTOLIC BLOOD PRESSURE: 66 MMHG | WEIGHT: 203.2 LBS | BODY MASS INDEX: 39.89 KG/M2 | HEIGHT: 60 IN | TEMPERATURE: 97.2 F

## 2022-09-21 DIAGNOSIS — E66.01 OBESITY, CLASS III, BMI 40-49.9 (MORBID OBESITY) (HCC): ICD-10-CM

## 2022-09-21 DIAGNOSIS — Z00.00 HEALTHCARE MAINTENANCE: ICD-10-CM

## 2022-09-21 DIAGNOSIS — R63.5 EXCESSIVE WEIGHT GAIN: ICD-10-CM

## 2022-09-21 DIAGNOSIS — F41.9 ANXIETY: ICD-10-CM

## 2022-09-21 DIAGNOSIS — E78.5 DYSLIPIDEMIA: ICD-10-CM

## 2022-09-21 DIAGNOSIS — E55.9 VITAMIN D DEFICIENCY: ICD-10-CM

## 2022-09-21 DIAGNOSIS — K21.9 GASTROESOPHAGEAL REFLUX DISEASE WITHOUT ESOPHAGITIS: Primary | ICD-10-CM

## 2022-09-21 DIAGNOSIS — N92.6 IRREGULAR MENSTRUAL CYCLE: ICD-10-CM

## 2022-09-21 PROBLEM — L02.213 ABSCESS OF CHEST WALL: Status: RESOLVED | Noted: 2021-11-11 | Resolved: 2022-09-21

## 2022-09-21 PROCEDURE — 99214 OFFICE O/P EST MOD 30 MIN: CPT | Performed by: PHYSICIAN ASSISTANT

## 2022-09-21 NOTE — ASSESSMENT & PLAN NOTE
Patient was offered screening and preventative services at today's visit  Encouraged to continue getting her mammograms and following up with her gynecologist for pelvic examinations  The influenza and COVID vaccination was recommended to the patient

## 2022-09-21 NOTE — ASSESSMENT & PLAN NOTE
Patient condition currently stable  Denies new or worsening symptoms of GERD  Educated to limit foods known to exacerbate her symptoms and to remain elevated 2-3 hours after eating

## 2022-09-21 NOTE — ASSESSMENT & PLAN NOTE
PT wants to try to eventually wean and get off her paxil which she has been on for the past 2 years  Educated to take 1/2 of her 20 mg Paxil tabs  Ashwaganda supplement discontinued 9/12  Encouraged to call the office if her symptoms worsen taking 10 mg of her 20 mg tablet  Patient denies HI or SI  She reports her condition is currently very stable and wishes to come off her medication to further help her weight loss journey  We will check on how pt is doing in 6 months

## 2022-09-21 NOTE — PROGRESS NOTES
Assessment and Plan:    Problem List Items Addressed This Visit        Digestive    Gastroesophageal reflux disease without esophagitis - Primary     Patient condition currently stable  Denies new or worsening symptoms of GERD  Educated to limit foods known to exacerbate her symptoms and to remain elevated 2-3 hours after eating  Other    Excessive weight gain     Patient encouraged to limit processed and fatty foods  Encouraged 150 minutes of exercise weekly per AHA guidelines  Understands to continue seeing dietician for additional management  Congratulated for weight loss thus far and encouraged to continue on her current regimen  Anxiety     PT wants to try to eventually wean and get off her paxil which she has been on for the past 2 years  Educated to take 1/2 of her 20 mg Paxil tabs  Ashwaganda supplement discontinued 9/12  Encouraged to call the office if her symptoms worsen taking 10 mg of her 20 mg tablet  Patient denies HI or SI  She reports her condition is currently very stable and wishes to come off her medication to further help her weight loss journey  We will check on how pt is doing in 6 months  Dyslipidemia     Patient condition currently very stable with total cholesterol 191, Triglycerides 93, HDL 41, and   Patient encouraged and congratulated to continue current regimen with daily exercise and limiting processed and unhealthy foods  Patient was able to reduce her numbers with diet and exercise alone and stopped her Crestor medication in July due to intolerable myalgias  Continue off meds and check in 6 months  Relevant Orders    Lipid Panel with Direct LDL reflex    Obesity, Class III, BMI 40-49 9 (morbid obesity) ([address] )     Patient encouraged and congratulated with overall weight loss of 30 pounds  Encouraged to continue watching her diet and limiting unhealthy processed foods  150 of exercise weekly encouraged with patient            Irregular menstrual cycle     Patient's current condition is being followed with gynecology  Endometrial biopsy was performed along with transvaginal U/S and was concluded to be WNL  Vitamin D deficiency     Encouraged to continue taking Vitamin D3 supplementation daily  Further management with medication dose adjustments will be made pending lab results for next visit in 6 months  Patient's condition currently stable  Relevant Orders    Vitamin D 25 hydroxy    Healthcare maintenance     Patient was offered screening and preventative services at today's visit  Encouraged to continue getting her mammograms and following up with her gynecologist for pelvic examinations  The influenza and COVID vaccination was recommended to the patient  Diagnoses and all orders for this visit:    Gastroesophageal reflux disease without esophagitis    Excessive weight gain    Anxiety    Dyslipidemia  -     Lipid Panel with Direct LDL reflex; Future    Obesity, Class III, BMI 40-49 9 (morbid obesity) (HCC)    Vitamin D deficiency  -     Vitamin D 25 hydroxy; Future    Healthcare maintenance    Irregular menstrual cycle            Subjective:      Patient ID: Nevin Arias is a 56 y o  female  CC:    Chief Complaint   Patient presents with    Follow-up     4 month f/u- review labs offers no other complaints  HPI:    Patient is a 56year old woman who presents to the outpatient clinic for a 4 month f/u  Patient states she has been able to reduce her cholesterol numbers with diet and exercise alone  She stopped taking her Crestor in July due to myalgias specifically within her hips  This prohibited her from working out and she states she would rather workout than take a medication  Patient also wishes to begin slowly weaning off her Paxil for anxiety which she has been on for roughly a little over 6 months     She believes her anxiety for the most part is very controlled and she wishes to lose even more weight by being off the medication  She has also stopped using her ashwaganda for her anxiety  Patient further mentions she has been experiencing irregular menstrual cycles  She has been experiencing heavy bleeding for prolonged periods of time  Patient denies painful menstrual cramping out of the normal  She had a transvaginal U/S and endometrial biopsy at her gynecologist's office  Patient did not note any abnormal findings on these imaging studies provided by her gynecologist              The following portions of the patient's history were reviewed and updated as appropriate: allergies, current medications, past family history, past medical history, past social history, past surgical history and problem list       Review of Systems   Constitutional: Negative  HENT: Negative  Eyes: Negative  Respiratory: Negative  Cardiovascular: Negative  Gastrointestinal: Negative  Endocrine: Negative  Genitourinary: Positive for menstrual problem  Negative for pelvic pain  Musculoskeletal: Negative  Skin: Negative  Allergic/Immunologic: Negative  Neurological: Negative  Hematological: Negative  Psychiatric/Behavioral: Negative  Negative for suicidal ideas  The patient is not nervous/anxious  All other systems reviewed and are negative  Data to review:       Objective:    Vitals:    09/21/22 0824   BP: 116/66   BP Location: Right arm   Patient Position: Sitting   Cuff Size: Adult   Pulse: 96   Temp: (!) 97 2 °F (36 2 °C)   TempSrc: Temporal   Weight: 92 2 kg (203 lb 3 2 oz)   Height: 5' (1 524 m)        Physical Exam  Vitals and nursing note reviewed  Constitutional:       Appearance: Normal appearance  She is well-developed  She is obese  HENT:      Head: Normocephalic and atraumatic        Right Ear: External ear normal       Left Ear: External ear normal       Nose: Nose normal       Mouth/Throat:      Mouth: Mucous membranes are moist    Eyes:      General: Lids are normal       Extraocular Movements: Extraocular movements intact  Conjunctiva/sclera: Conjunctivae normal       Pupils: Pupils are equal, round, and reactive to light  Cardiovascular:      Rate and Rhythm: Normal rate and regular rhythm  Pulses: Normal pulses  Heart sounds: Normal heart sounds  No murmur heard  Pulmonary:      Effort: Pulmonary effort is normal       Breath sounds: Normal breath sounds  Musculoskeletal:         General: Normal range of motion  Cervical back: Normal range of motion  Skin:     General: Skin is warm and dry  Neurological:      General: No focal deficit present  Mental Status: She is alert  Coordination: Coordination is intact  Psychiatric:         Mood and Affect: Mood normal          Behavior: Behavior normal  Behavior is cooperative  Thought Content:  Thought content normal          Judgment: Judgment normal

## 2022-09-21 NOTE — ASSESSMENT & PLAN NOTE
Patient condition currently very stable with total cholesterol 191, Triglycerides 93, HDL 41, and   Patient encouraged and congratulated to continue current regimen with daily exercise and limiting processed and unhealthy foods  Patient was able to reduce her numbers with diet and exercise alone and stopped her Crestor medication in July due to intolerable myalgias  Continue off meds and check in 6 months

## 2022-09-21 NOTE — ASSESSMENT & PLAN NOTE
Patient's current condition is being followed with gynecology  Endometrial biopsy was performed along with transvaginal U/S and was concluded to be WNL

## 2022-09-21 NOTE — PROGRESS NOTES
Assessment and Plan:    Problem List Items Addressed This Visit        Digestive    Gastroesophageal reflux disease without esophagitis - Primary     Patient condition currently stable  Denies new or worsening symptoms of GERD  Educated to limit foods known to exacerbate her symptoms and to remain elevated 2-3 hours after eating  Other    Excessive weight gain     Patient encouraged to limit processed and fatty foods  Encouraged 150 minutes of exercise weekly per AHA guidelines  Understands to continue seeing dietician for additional management  Congratulated for weight loss thus far and encouraged to continue on her current regimen  Anxiety     PT wants to try to eventually wean and get off her paxil which she has been on for the past 2 years  Educated to take 1/2 of her 20 mg Paxil tabs  Ashwaganda supplement discontinued 9/12  Encouraged to call the office if her symptoms worsen taking 10 mg of her 20 mg tablet  Patient denies HI or SI  She reports her condition is currently very stable and wishes to come off her medication to further help her weight loss journey  We will check on how pt is doing in 6 months  Dyslipidemia     Patient condition currently very stable with total cholesterol 191, Triglycerides 93, HDL 41, and   Patient encouraged and congratulated to continue current regimen with daily exercise and limiting processed and unhealthy foods  Patient was able to reduce her numbers with diet and exercise alone and stopped her Crestor medication in July due to intolerable myalgias  Continue off meds and check in 6 months  Relevant Orders    Lipid Panel with Direct LDL reflex    Obesity, Class III, BMI 40-49 9 (morbid obesity) ([address] )     Patient encouraged and congratulated with overall weight loss of 30 pounds  Encouraged to continue watching her diet and limiting unhealthy processed foods  150 of exercise weekly encouraged with patient            Irregular menstrual cycle     Patient's current condition is being followed with gynecology  Endometrial biopsy was performed along with transvaginal U/S and was concluded to be WNL  Vitamin D deficiency     Encouraged to continue taking Vitamin D3 supplementation daily  Further management with medication dose adjustments will be made pending lab results for next visit in 6 months  Patient's condition currently stable  Relevant Orders    Vitamin D 25 hydroxy    Healthcare maintenance     Patient was offered screening and preventative services at today's visit  Encouraged to continue getting her mammograms and following up with her gynecologist for pelvic examinations  The influenza and COVID vaccination was recommended to the patient  {Assess/PlanSmartLinks:12160}          Subjective:      Patient ID: Selena Kennedy is a 56 y o  female  CC:    Chief Complaint   Patient presents with    Follow-up     4 month f/u- review labs offers no other complaints          HPI:    HPI    {Common ambulatory SmartLinks:39901}      Review of Systems      Data to review:       Objective:    Vitals:    09/21/22 0824   BP: 116/66   BP Location: Right arm   Patient Position: Sitting   Cuff Size: Adult   Pulse: 96   Temp: (!) 97 2 °F (36 2 °C)   TempSrc: Temporal   Weight: 92 2 kg (203 lb 3 2 oz)   Height: 5' (1 524 m)        Physical Exam

## 2022-09-21 NOTE — ASSESSMENT & PLAN NOTE
Patient encouraged to limit processed and fatty foods  Encouraged 150 minutes of exercise weekly per AHA guidelines  Understands to continue seeing dietician for additional management  Congratulated for weight loss thus far and encouraged to continue on her current regimen

## 2022-09-21 NOTE — ASSESSMENT & PLAN NOTE
Encouraged to continue taking Vitamin D3 supplementation daily  Further management with medication dose adjustments will be made pending lab results for next visit in 6 months  Patient's condition currently stable

## 2022-09-21 NOTE — TELEPHONE ENCOUNTER
Procedure for 10/13/22 had to be cx'd and rescheduled due to change in GI Lab schedule  rescheduled for 12/01/22 @[facility] with Dr Guerda Light    Lm for pt to cb my direct ext to confirm

## 2022-09-21 NOTE — ASSESSMENT & PLAN NOTE
Patient encouraged and congratulated with overall weight loss of 30 pounds  Encouraged to continue watching her diet and limiting unhealthy processed foods  150 of exercise weekly encouraged with patient

## 2022-10-07 ENCOUNTER — OFFICE VISIT (OUTPATIENT)
Dept: BARIATRICS | Facility: CLINIC | Age: 47
End: 2022-10-07
Payer: MEDICARE

## 2022-10-07 VITALS
HEART RATE: 76 BPM | SYSTOLIC BLOOD PRESSURE: 126 MMHG | BODY MASS INDEX: 39.15 KG/M2 | WEIGHT: 199.4 LBS | HEIGHT: 60 IN | OXYGEN SATURATION: 97 % | DIASTOLIC BLOOD PRESSURE: 78 MMHG | RESPIRATION RATE: 16 BRPM

## 2022-10-07 DIAGNOSIS — E66.9 OBESITY, CLASS II, BMI 35-39.9: Primary | ICD-10-CM

## 2022-10-07 DIAGNOSIS — F41.9 ANXIETY: ICD-10-CM

## 2022-10-07 PROBLEM — E66.812 OBESITY, CLASS II, BMI 35-39.9: Status: ACTIVE | Noted: 2019-01-18

## 2022-10-07 PROCEDURE — 99213 OFFICE O/P EST LOW 20 MIN: CPT | Performed by: NURSE PRACTITIONER

## 2022-10-07 NOTE — PROGRESS NOTES
Assessment/Plan:     Obesity, Class II, BMI 35-39 9  - Patient is pursuing Conservative Program with bundles with the dietician  - She is not interested in weight loss surgery  - Initial weight loss goal of 5-10% weight loss for improved health  - labs reviewed: CMP and lipid panel 9/16/2022  HDL low and LDL elevated, which will likely improve with weight loss, otherwise, labs within acceptable range  - Discussed weight loss medications  She is happy with her progress, but a bit frustrated the weight loss isn't occurring fasting  Encouragement provided that she is doing a great job and continues to gradually lose weight  Will hold off on weight loss medications for now  Can always reconsider if she starts to hit a plateau with weight loss  - Primary care is weaning down on Paxil and this might be discontinued all together at some point  Could consider starting Wellbutrin if an antidepressant needed, to also assist with weight loss  - Denies history of seizures, glaucoma, or kidney stones  Initial: 220 lbs BMI 41 7  Current: 199 4 lbs BMI 37 8   Change: -20 6 lbs (-12 3 lbs since the last office visit)  Goal: 180 lbs    Goals:  - Continue food logging, weighing and measuring    - Keep up the great work with exercise  - Keep up the great water intake  - Continue nutrition recommendations per the dietician   - 7290-5311 calories, flex to 1600 calories on cardio days  Anxiety  - Taking paroxetine  Continue management with prescribing provider  Alexandrea Garcia was seen today for follow-up  Diagnoses and all orders for this visit:    Obesity, Class II, BMI 35-39 9    Anxiety          Follow up in approximately 2 months with Non-Surgical Physician/Advanced Practitioner  Subjective:   Chief Complaint   Patient presents with    Follow-up     MWM 2 mth fu        Patient ID: Emil Ramires  is a 56 y o  female with excess weight/obesity here to pursue weight management    Patient is pursuing Conservative Program with bundles with the dietician  Most recent notes and records were reviewed  HPI    Wt Readings from Last 10 Encounters:   10/07/22 90 4 kg (199 lb 6 4 oz)   09/21/22 92 2 kg (203 lb 3 2 oz)   08/31/22 93 1 kg (205 lb 3 2 oz)   08/31/22 92 kg (202 lb 11 5 oz)   07/27/22 96 kg (211 lb 9 6 oz)   07/15/22 96 kg (211 lb 11 2 oz)   06/24/22 97 5 kg (214 lb 13 4 oz)   05/13/22 99 8 kg (220 lb)   04/22/22 102 kg (225 lb)   04/08/22 102 kg (225 lb)     Food logging: yes, getting closer to 1400 calories per day and 1600 on active days  B- greek yogurt and protein shake and fruit  S- protein bar  L- carb balance wrap with 1 5-2 oz chicken breast and tomato and spinach   S- rice cake  D- protein and veggies and 1/2 cup starch   S- frozen yogurt or fruit     Hydration- 5 - 22 oz water daily, occ green tea - black   Alcohol- none  Exercise- 5 days per week treadmill for 20 minutes, stationary bike or fast walk 3 times per week for 30-45 minutes and 3 nights per week gets and extra walk in the evening or [employer]      Colonoscopy: scheduled December 2023  Mammogram: UTD, due April 2023          The following portions of the patient's history were reviewed and updated as appropriate: allergies, current medications, past family history, past medical history, past social history, past surgical history, and problem list     Family History   Problem Relation Age of Onset    Rheum arthritis Mother     Hypertension Mother     Hyperlipidemia Mother     Hepatitis Father         B    Hyperlipidemia Father     Hypertension Father     Anxiety disorder Sister     Depression Sister     Lung cancer Maternal Grandmother         over [de-identified]    Rheum arthritis Maternal Grandmother     Cancer Maternal Grandmother         advanced, unknown primary    Kidney disease Maternal Grandfather     Anxiety disorder Paternal Grandmother     Depression Paternal Grandmother     Bipolar disorder Paternal Grandmother  Lung cancer Paternal Grandmother         over [de-identified]    Cancer Paternal Grandmother         lung    Diabetes Paternal Grandfather     Kidney disease Paternal Grandfather     Breast cancer Maternal Aunt 29    Heart disease Maternal Uncle     Nephrolithiasis Family     Colon cancer Neg Hx     Ovarian cancer Neg Hx     Uterine cancer Neg Hx     Stroke Neg Hx     Thyroid disease Neg Hx         Review of Systems   HENT: Negative for sore throat  Respiratory: Negative for cough and shortness of breath  Cardiovascular: Negative for chest pain and palpitations  Gastrointestinal: Positive for diarrhea (diet, getting better )  Negative for abdominal pain, constipation, nausea and vomiting  Denies GERD   Skin: Negative for rash  Psychiatric/Behavioral: Negative for suicidal ideas (or HI)  Denies depression and anxiety       Objective:  /78   Pulse 76   Resp 16   Ht 5' (1 524 m)   Wt 90 4 kg (199 lb 6 4 oz)   SpO2 97%   BMI 38 94 kg/m²     Physical Exam  Vitals and nursing note reviewed  Constitutional   General appearance: Abnormal   well developed and obese  Eyes No conjunctival injection  Ears, Nose, Mouth, and Throat Oral mucosa moist    Pulmonary   Respiratory effort: No increased work of breathing or signs of respiratory distress  Cardiovascular     Examination of extremities for edema and/or varicosities: Normal   no edema  Abdomen   Abdomen: Abnormal   The abdomen was obese      Musculoskeletal   Normal range of motion  Neurological   Gait and station: Normal     Psychiatric   Orientation to person, place and time: Normal     Affect: appropriate

## 2022-10-07 NOTE — ASSESSMENT & PLAN NOTE
- Patient is pursuing Conservative Program with bundles with the dietician  - She is not interested in weight loss surgery  - Initial weight loss goal of 5-10% weight loss for improved health  - labs reviewed: CMP and lipid panel 9/16/2022  HDL low and LDL elevated, which will likely improve with weight loss, otherwise, labs within acceptable range  - Discussed weight loss medications  She is happy with her progress, but a bit frustrated the weight loss isn't occurring fasting  Encouragement provided that she is doing a great job and continues to gradually lose weight  Will hold off on weight loss medications for now  Can always reconsider if she starts to hit a plateau with weight loss  - Primary care is weaning down on Paxil and this might be discontinued all together at some point  Could consider starting Wellbutrin if an antidepressant needed, to also assist with weight loss  - Denies history of seizures, glaucoma, or kidney stones  Initial: 220 lbs BMI 41 7  Current: 199 4 lbs BMI 37 8   Change: -20 6 lbs (-12 3 lbs since the last office visit)  Goal: 180 lbs    Goals:  - Continue food logging, weighing and measuring    - Keep up the great work with exercise  - Keep up the great water intake  - Continue nutrition recommendations per the dietician   - 0474-1400 calories, flex to 1600 calories on cardio days

## 2022-10-11 PROBLEM — Z00.00 HEALTHCARE MAINTENANCE: Status: RESOLVED | Noted: 2022-04-08 | Resolved: 2022-10-11

## 2022-11-11 ENCOUNTER — OFFICE VISIT (OUTPATIENT)
Dept: BARIATRICS | Facility: CLINIC | Age: 47
End: 2022-11-11

## 2022-11-11 VITALS — HEIGHT: 60 IN | BODY MASS INDEX: 37.66 KG/M2 | WEIGHT: 191.8 LBS

## 2022-11-11 DIAGNOSIS — R63.5 ABNORMAL WEIGHT GAIN: ICD-10-CM

## 2022-11-30 RX ORDER — SODIUM CHLORIDE 9 MG/ML
50 INJECTION, SOLUTION INTRAVENOUS CONTINUOUS
Status: CANCELLED | OUTPATIENT
Start: 2022-11-30

## 2022-12-01 ENCOUNTER — HOSPITAL ENCOUNTER (OUTPATIENT)
Dept: GASTROENTEROLOGY | Facility: HOSPITAL | Age: 47
Setting detail: OUTPATIENT SURGERY
End: 2022-12-01
Attending: INTERNAL MEDICINE

## 2022-12-01 ENCOUNTER — ANESTHESIA EVENT (OUTPATIENT)
Dept: GASTROENTEROLOGY | Facility: HOSPITAL | Age: 47
End: 2022-12-01

## 2022-12-01 ENCOUNTER — ANESTHESIA (OUTPATIENT)
Dept: GASTROENTEROLOGY | Facility: HOSPITAL | Age: 47
End: 2022-12-01

## 2022-12-01 ENCOUNTER — ANESTHESIA EVENT (OUTPATIENT)
Dept: ANESTHESIOLOGY | Facility: HOSPITAL | Age: 47
End: 2022-12-01

## 2022-12-01 ENCOUNTER — ANESTHESIA (OUTPATIENT)
Dept: ANESTHESIOLOGY | Facility: HOSPITAL | Age: 47
End: 2022-12-01

## 2022-12-01 VITALS
TEMPERATURE: 96.1 F | OXYGEN SATURATION: 100 % | DIASTOLIC BLOOD PRESSURE: 52 MMHG | HEART RATE: 57 BPM | SYSTOLIC BLOOD PRESSURE: 91 MMHG | RESPIRATION RATE: 18 BRPM

## 2022-12-01 DIAGNOSIS — Z12.11 SPECIAL SCREENING FOR MALIGNANT NEOPLASMS, COLON: ICD-10-CM

## 2022-12-01 LAB
EXT PREGNANCY TEST URINE: NEGATIVE
EXT. CONTROL: NORMAL

## 2022-12-01 RX ORDER — PROPOFOL 10 MG/ML
INJECTION, EMULSION INTRAVENOUS AS NEEDED
Status: DISCONTINUED | OUTPATIENT
Start: 2022-12-01 | End: 2022-12-01

## 2022-12-01 RX ORDER — SODIUM CHLORIDE 9 MG/ML
50 INJECTION, SOLUTION INTRAVENOUS CONTINUOUS
Status: DISCONTINUED | OUTPATIENT
Start: 2022-12-01 | End: 2022-12-05 | Stop reason: HOSPADM

## 2022-12-01 RX ADMIN — PROPOFOL 50 MG: 10 INJECTION, EMULSION INTRAVENOUS at 11:53

## 2022-12-01 RX ADMIN — SODIUM CHLORIDE 50 ML/HR: 0.9 INJECTION, SOLUTION INTRAVENOUS at 10:44

## 2022-12-01 RX ADMIN — PROPOFOL 50 MG: 10 INJECTION, EMULSION INTRAVENOUS at 11:58

## 2022-12-01 RX ADMIN — PROPOFOL 50 MG: 10 INJECTION, EMULSION INTRAVENOUS at 11:49

## 2022-12-01 RX ADMIN — PROPOFOL 50 MG: 10 INJECTION, EMULSION INTRAVENOUS at 11:47

## 2022-12-01 RX ADMIN — PROPOFOL 50 MG: 10 INJECTION, EMULSION INTRAVENOUS at 11:46

## 2022-12-01 RX ADMIN — PROPOFOL 50 MG: 10 INJECTION, EMULSION INTRAVENOUS at 11:45

## 2022-12-01 RX ADMIN — PROPOFOL 50 MG: 10 INJECTION, EMULSION INTRAVENOUS at 11:51

## 2022-12-01 NOTE — ANESTHESIA PREPROCEDURE EVALUATION
Procedure:  COLONOSCOPY    Relevant Problems   CARDIO   (+) Bleeding hemorrhoid      GI/HEPATIC   (+) Bleeding hemorrhoid   (+) Gastroesophageal reflux disease without esophagitis      NEURO/PSYCH   (+) Anxiety        Physical Exam    Airway    Mallampati score: II  TM Distance: >3 FB  Neck ROM: full     Dental       Cardiovascular  Cardiovascular exam normal    Pulmonary  Pulmonary exam normal     Other Findings        Anesthesia Plan  ASA Score- 2     Anesthesia Type- IV sedation with anesthesia with ASA Monitors  Additional Monitors:   Airway Plan:           Plan Factors-Exercise tolerance (METS): >4 METS  Chart reviewed  Existing labs reviewed  Patient summary reviewed  Patient is not a current smoker  Patient not instructed to abstain from smoking on day of procedure  Patient did not smoke on day of surgery  Induction-     Postoperative Plan-     Informed Consent- Anesthetic plan and risks discussed with patient  I personally reviewed this patient with the CRNA  Discussed and agreed on the Anesthesia Plan with the CRNA             Lab Results   Component Value Date    HGBA1C 5 3 05/20/2022       Lab Results   Component Value Date     05/12/2015    K 4 0 09/16/2022     09/16/2022    CO2 30 09/16/2022    ANIONGAP 22 (H) 05/12/2015    BUN 15 09/16/2022    CREATININE 0 82 09/16/2022    GLUCOSE 85 05/12/2015    GLUF 97 09/16/2022    CALCIUM 8 8 09/16/2022    AST 17 09/16/2022    ALT 32 09/16/2022    ALKPHOS 71 09/16/2022    EGFR 86 09/16/2022       Lab Results   Component Value Date    WBC 8 28 11/21/2021    HGB 13 2 11/21/2021    HCT 40 1 11/21/2021    MCV 89 11/21/2021     11/21/2021   echo 2019      LEFT VENTRICLE: Size was normal  Systolic function was normal  Ejection fraction was estimated to be 60 %  There were no regional wall motion abnormalities   Wall thickness was normal  DOPPLER: Left ventricular diastolic function parameters  were normal      RIGHT VENTRICLE: The size was normal  Systolic function was normal  Wall thickness was normal      LEFT ATRIUM: Size was normal      RIGHT ATRIUM: Size was normal      MITRAL VALVE: Valve structure was normal  There was normal leaflet separation  DOPPLER: The transmitral velocity was within the normal range  There was no evidence for stenosis  There was no significant regurgitation      AORTIC VALVE: The valve was trileaflet  Leaflets exhibited normal thickness and normal cuspal separation  DOPPLER: Transaortic velocity was within the normal range  There was no evidence for stenosis  There was no significant  regurgitation      TRICUSPID VALVE: The valve structure was normal  There was normal leaflet separation  DOPPLER: The transtricuspid velocity was within the normal range  There was no evidence for stenosis  There was trace regurgitation  Estimated peak PA  pressure was 23 mmHg      PULMONIC VALVE: Leaflets exhibited normal thickness, no calcification, and normal cuspal separation  DOPPLER: The transpulmonic velocity was within the normal range   There was no significant regurgitation      PERICARDIUM: There was no pericardial effusion      AORTA: The root exhibited normal size      SYSTEMIC VEINS: IVC: The inferior vena cava was normal in size

## 2022-12-01 NOTE — NURSING NOTE
Pt is awake,alert,tolerated diet,seen and instructed by Dr Dung Tariq, written and verbal instructions given, pt and S/O verbalize an understanding

## 2022-12-01 NOTE — ANESTHESIA POSTPROCEDURE EVALUATION
Post-Op Assessment Note    CV Status:  Stable    Pain management: adequate     Mental Status:  Alert and awake   Hydration Status:  Euvolemic   PONV Controlled:  Controlled   Airway Patency:  Patent      Post Op Vitals Reviewed: Yes      Staff: Anesthesiologist         No notable events documented      BP   86/50   Temp     Pulse  55   Resp   20   SpO2   100%

## 2022-12-01 NOTE — ANESTHESIA PREPROCEDURE EVALUATION
Procedure:  PRE-OP ONLY    Relevant Problems   CARDIO   (+) Bleeding hemorrhoid      GI/HEPATIC   (+) Bleeding hemorrhoid   (+) Gastroesophageal reflux disease without esophagitis      NEURO/PSYCH   (+) Anxiety      Other   (+) Dyslipidemia   (+) Excessive weight gain   (+) Obesity, Class II, BMI 35-39 9             Anesthesia Plan  ASA Score- 2     Anesthesia Type-     Plan Factors-Exercise tolerance (METS): >4 METS  Chart reviewed  Existing labs reviewed  Patient summary reviewed  Induction-     Postoperative Plan-     Informed Consent- Anesthetic plan and risks discussed with patient  I personally reviewed this patient with the CRNA  Discussed and agreed on the Anesthesia Plan with the CRNA               Lab Results   Component Value Date    HGBA1C 5 3 05/20/2022       Lab Results   Component Value Date     05/12/2015    K 4 0 09/16/2022     09/16/2022    CO2 30 09/16/2022    ANIONGAP 22 (H) 05/12/2015    BUN 15 09/16/2022    CREATININE 0 82 09/16/2022    GLUCOSE 85 05/12/2015    GLUF 97 09/16/2022    CALCIUM 8 8 09/16/2022    AST 17 09/16/2022    ALT 32 09/16/2022    ALKPHOS 71 09/16/2022    EGFR 86 09/16/2022       Lab Results   Component Value Date    WBC 8 28 11/21/2021    HGB 13 2 11/21/2021    HCT 40 1 11/21/2021    MCV 89 11/21/2021     11/21/2021   echo 2019      LEFT VENTRICLE: Size was normal  Systolic function was normal  Ejection fraction was estimated to be 60 %  There were no regional wall motion abnormalities  Wall thickness was normal  DOPPLER: Left ventricular diastolic function parameters  were normal      RIGHT VENTRICLE: The size was normal  Systolic function was normal  Wall thickness was normal      LEFT ATRIUM: Size was normal      RIGHT ATRIUM: Size was normal      MITRAL VALVE: Valve structure was normal  There was normal leaflet separation  DOPPLER: The transmitral velocity was within the normal range  There was no evidence for stenosis   There was no significant regurgitation      AORTIC VALVE: The valve was trileaflet  Leaflets exhibited normal thickness and normal cuspal separation  DOPPLER: Transaortic velocity was within the normal range  There was no evidence for stenosis  There was no significant  regurgitation      TRICUSPID VALVE: The valve structure was normal  There was normal leaflet separation  DOPPLER: The transtricuspid velocity was within the normal range  There was no evidence for stenosis  There was trace regurgitation  Estimated peak PA  pressure was 23 mmHg      PULMONIC VALVE: Leaflets exhibited normal thickness, no calcification, and normal cuspal separation  DOPPLER: The transpulmonic velocity was within the normal range   There was no significant regurgitation      PERICARDIUM: There was no pericardial effusion      AORTA: The root exhibited normal size      SYSTEMIC VEINS: IVC: The inferior vena cava was normal in size

## 2022-12-01 NOTE — H&P
[facility] - History & Physical  Saran Correia 56 y o  female MRN: [id number]      HPI: Saran Correia is a 56y o  year old female who presents for screening colonoscopy  No prior colonoscopy  Not on antiplatelets or anticoagulants  REVIEW OF SYSTEMS: Per the HPI, and otherwise unremarkable      Historical Information   Past Medical History:   Diagnosis Date   • Abnormal Pap smear of cervix     2016 pap/hpv negative; 1/2021 pap/hpv negative   • Anxiety    • Breast nodule 2009    Fibroglandular changes   • GERD (gastroesophageal reflux disease)    • Hyperlipidemia    • Migraine with aura and without status migrainosus, not intractable    • Morbid obesity with BMI of 40 0-44 9, adult ([facility] [address] )    • Peptic ulcer    • Polycystic ovary syndrome 3/19    patient has always gotten monthly periods     Past Surgical History:   Procedure Laterality Date   • COLONOSCOPY     • ORTHODONTIC TREATMENT      16 yo; incisors pulled down   • CT ANAL SPHINCTEROTOMY N/A 3/20/2020    Procedure: LEFT PARTIAL SPHINCTEROTOMY; AND FISSURECTOMY;  Surgeon: Anel Hammond MD;  Location: [address] [place] [address] OR;  Service: Colorectal   • WISDOM TOOTH EXTRACTION       Social History   Social History     Substance and Sexual Activity   Alcohol Use Not Currently    Comment: Less than a drink a week, very rarely     Social History     Substance and Sexual Activity   Drug Use No     Social History     Tobacco Use   Smoking Status Never   Smokeless Tobacco Never     Family History   Problem Relation Age of Onset   • Rheum arthritis Mother    • Hypertension Mother    • Hyperlipidemia Mother    • Hepatitis Father         B   • Hyperlipidemia Father    • Hypertension Father    • Anxiety disorder Sister    • Depression Sister    • Lung cancer Maternal Grandmother         over [de-identified]   • Rheum arthritis Maternal Grandmother    • Cancer Maternal Grandmother         advanced, unknown primary   • Kidney disease Maternal Grandfather    • Anxiety disorder Paternal Grandmother    • Depression Paternal Grandmother    • Bipolar disorder Paternal Grandmother    • Lung cancer Paternal Grandmother         over [de-identified]   • Cancer Paternal Grandmother         lung   • Diabetes Paternal Grandfather    • Kidney disease Paternal Grandfather    • Breast cancer Maternal Aunt 34   • Heart disease Maternal Uncle    • Nephrolithiasis Family    • Colon cancer Neg Hx    • Ovarian cancer Neg Hx    • Uterine cancer Neg Hx    • Stroke Neg Hx    • Thyroid disease Neg Hx        MEDICATIONS & ALLERGIES:    Current Outpatient Medications   Medication Instructions   • b complex vitamins capsule 1 capsule, Oral, Daily   • multivitamin (THERAGRAN) TABS 1 tablet, Oral, Daily   • PARoxetine (PAXIL) 20 mg tablet TAKE ONE TABLET BY MOUTH EVERY DAY    • polyethylene glycol (GOLYTELY) 4000 mL solution 4,000 mL, Oral, Once, Take as directed by office   • triamcinolone (KENALOG) 0 1 % cream Topical, 2 times daily   • Vitamin D3 2,000 Units, Oral, Daily   • Zinc 50 MG TABS Oral     Allergies   Allergen Reactions   • Lipitor [Atorvastatin] Myalgia   • Morphine    • Bactrim [Sulfamethoxazole-Trimethoprim] Rash       PHYSICAL EXAM:    Objective   Blood pressure 102/59, pulse 60, temperature (!) 97 1 °F (36 2 °C), temperature source Temporal, resp  rate 18, SpO2 98 %, not currently breastfeeding  There is no height or weight on file to calculate BMI  Gen: NAD  CV: RRR  CHEST: Clear  ABD: Soft, NT/ND  EXT: No edema    ASSESSMENT AND PLAN:  This is a 56y o  year old female here for screening colonoscopy, and she is stable and optimized for her procedure  BANDAR Sheppard  Chief Gastroenterology Fellow  [facility]  Division of Gastroenterology and Hepatology  Available on [email or web address]

## 2022-12-02 ENCOUNTER — OFFICE VISIT (OUTPATIENT)
Dept: BARIATRICS | Facility: CLINIC | Age: 47
End: 2022-12-02

## 2022-12-02 VITALS
HEART RATE: 76 BPM | WEIGHT: 190.6 LBS | BODY MASS INDEX: 37.42 KG/M2 | OXYGEN SATURATION: 98 % | RESPIRATION RATE: 14 BRPM | HEIGHT: 60 IN | DIASTOLIC BLOOD PRESSURE: 70 MMHG | SYSTOLIC BLOOD PRESSURE: 125 MMHG

## 2022-12-02 DIAGNOSIS — F41.9 ANXIETY: ICD-10-CM

## 2022-12-02 DIAGNOSIS — E66.9 OBESITY, CLASS II, BMI 35-39.9: Primary | ICD-10-CM

## 2022-12-02 NOTE — PROGRESS NOTES
Assessment/Plan:     Obesity, Class II, BMI 35-39 9  - Patient is pursuing Conservative Program with bundles with the dietician  - She is not interested in weight loss surgery  - Initial weight loss goal of 5-10% weight loss for improved health   - She is happy with her progress, but a bit frustrated the weight loss isn't occurring faster  Discussed that she is doing a great job and continues to gradually lose weight  Will hold off on weight loss medications for now  Can always reconsider if she starts to hit a plateau with weight loss  - Denies history of seizures, glaucoma, or kidney stones  Initial: 220 lbs BMI 41 7  Current: 190 6 lbs BMI 37 22  Change: -29 4 lbs   Goal: 180 lbs    Goals:  - Continue food logging, weighing and measuring    - Keep up the great work with exercise  - Keep up the great water intake  - Continue nutrition recommendations per the dietician   - 6967-1934 calories, flex to 1600 calories on cardio days  Anxiety  - Taking paroxetine  Continue management with prescribing provider  Omari Santos was seen today for follow-up  Diagnoses and all orders for this visit:    Obesity, Class II, BMI 35-39 9    Anxiety          Follow up in approximately 2 months with Non-Surgical Physician/Advanced Practitioner  Subjective:   Chief Complaint   Patient presents with   • Follow-up     MWM 2 mth fu, waist 42 5       Patient ID: Елена Chen  is a 56 y o  female with excess weight/obesity here to pursue weight management  Patient is pursuing Conservative Program with bundles with the dietician  Presents to the office visit with  Lorena Given  Most recent notes and records were reviewed      HPI    Wt Readings from Last 10 Encounters:   12/02/22 86 5 kg (190 lb 9 6 oz)   11/11/22 87 kg (191 lb 12 8 oz)   10/07/22 90 4 kg (199 lb 6 4 oz)   09/21/22 92 2 kg (203 lb 3 2 oz)   08/31/22 93 1 kg (205 lb 3 2 oz)   08/31/22 92 kg (202 lb 11 5 oz)   07/27/22 96 kg (211 lb 9 6 oz) 07/15/22 96 kg (211 lb 11 2 oz)   06/24/22 97 5 kg (214 lb 13 4 oz)   05/13/22 99 8 kg (220 lb)       Food logging: yes, typically getting around 1400 calories per day and sometimes up to 1600       B- greek yogurt and protein shake and blueberries   S- protein bar  L- carb balance wrap with chicken breast and tomato and spinach   S- rice cake  D- protein and veggies and 1/2 cup starch   S- frozen yogurt or fruit or protein bar or fairlife chocolate milk     Hydration- 6 - 22 oz water daily, occ green tea - black, occ fairlife chocolate milk   Alcohol- none  Exercise- 5 days per week treadmill for 20-35 minutes and often walks again later in day on treadmill        Colonoscopy: UTD, due 2029  Mammogram: UTD, due April 2023        The following portions of the patient's history were reviewed and updated as appropriate: allergies, current medications, past family history, past medical history, past social history, past surgical history, and problem list     Family History   Problem Relation Age of Onset   • Rheum arthritis Mother    • Hypertension Mother    • Hyperlipidemia Mother    • Hepatitis Father         B   • Hyperlipidemia Father    • Hypertension Father    • Anxiety disorder Sister    • Depression Sister    • Lung cancer Maternal Grandmother         over [de-identified]   • Rheum arthritis Maternal Grandmother    • Cancer Maternal Grandmother         advanced, unknown primary   • Kidney disease Maternal Grandfather    • Anxiety disorder Paternal Grandmother    • Depression Paternal Grandmother    • Bipolar disorder Paternal Grandmother    • Lung cancer Paternal Grandmother         over [de-identified]   • Cancer Paternal Grandmother         lung   • Diabetes Paternal Grandfather    • Kidney disease Paternal Grandfather    • Breast cancer Maternal Aunt 34   • Heart disease Maternal Uncle    • Nephrolithiasis Family    • Colon cancer Neg Hx    • Ovarian cancer Neg Hx    • Uterine cancer Neg Hx    • Stroke Neg Hx    • Thyroid disease Neg Hx         Review of Systems   HENT: Negative for sore throat  Respiratory: Negative for cough and shortness of breath  Cardiovascular: Negative for chest pain and palpitations  Gastrointestinal: Positive for diarrhea (just had colonoscopy)  Negative for abdominal pain, constipation, nausea and vomiting  Denies GERD   Skin: Negative for rash  Psychiatric/Behavioral: Negative for suicidal ideas (or HI)  Denies depression and anxiety       Objective:  /70   Pulse 76   Resp 14   Ht 5' (1 524 m)   Wt 86 5 kg (190 lb 9 6 oz)   SpO2 98%   BMI 37 22 kg/m²     Physical Exam  Vitals and nursing note reviewed  Constitutional   General appearance: Abnormal   well developed and obese  Eyes No conjunctival injection  Ears, Nose, Mouth, and Throat Oral mucosa moist    Pulmonary   Respiratory effort: No increased work of breathing or signs of respiratory distress  Cardiovascular     Examination of extremities for edema and/or varicosities: Normal   no edema  Abdomen   Abdomen: Abnormal   The abdomen was obese      Musculoskeletal   Normal range of motion  Neurological   Gait and station: Normal     Psychiatric   Orientation to person, place and time: Normal     Affect: appropriate

## 2022-12-02 NOTE — ASSESSMENT & PLAN NOTE
- Patient is pursuing Conservative Program with bundles with the dietician  - She is not interested in weight loss surgery  - Initial weight loss goal of 5-10% weight loss for improved health   - She is happy with her progress, but a bit frustrated the weight loss isn't occurring faster  Discussed that she is doing a great job and continues to gradually lose weight  Will hold off on weight loss medications for now  Can always reconsider if she starts to hit a plateau with weight loss  - Denies history of seizures, glaucoma, or kidney stones  Initial: 220 lbs BMI 41 7  Current: 190 6 lbs BMI 37 22  Change: -29 4 lbs   Goal: 180 lbs    Goals:  - Continue food logging, weighing and measuring    - Keep up the great work with exercise  - Keep up the great water intake  - Continue nutrition recommendations per the dietician   - 3779-8628 calories, flex to 1600 calories on cardio days

## 2023-01-13 ENCOUNTER — OFFICE VISIT (OUTPATIENT)
Dept: BARIATRICS | Facility: CLINIC | Age: 48
End: 2023-01-13

## 2023-01-13 VITALS — BODY MASS INDEX: 35.92 KG/M2 | HEIGHT: 60 IN | WEIGHT: 182.98 LBS

## 2023-01-13 DIAGNOSIS — R63.5 ABNORMAL WEIGHT GAIN: ICD-10-CM

## 2023-02-03 ENCOUNTER — OFFICE VISIT (OUTPATIENT)
Dept: BARIATRICS | Facility: CLINIC | Age: 48
End: 2023-02-03

## 2023-02-03 VITALS
BODY MASS INDEX: 35.61 KG/M2 | DIASTOLIC BLOOD PRESSURE: 72 MMHG | RESPIRATION RATE: 16 BRPM | HEIGHT: 60 IN | SYSTOLIC BLOOD PRESSURE: 122 MMHG | HEART RATE: 82 BPM | WEIGHT: 181.4 LBS

## 2023-02-03 DIAGNOSIS — F41.9 ANXIETY: ICD-10-CM

## 2023-02-03 DIAGNOSIS — E66.9 OBESITY, CLASS II, BMI 35-39.9: Primary | ICD-10-CM

## 2023-02-03 RX ORDER — BUPROPION HYDROCHLORIDE 150 MG/1
150 TABLET ORAL DAILY
Qty: 30 TABLET | Refills: 1 | Status: SHIPPED | OUTPATIENT
Start: 2023-02-03

## 2023-02-03 NOTE — ASSESSMENT & PLAN NOTE
- Patient is pursuing Conservative Program with bundles with the dietician  - She is not interested in weight loss surgery  - Initial weight loss goal of 5-10% weight loss for improved health   - She has been gradually losing weight, but more recently is struggling with cravings for chocolate in the setting of stress  She feels that weight loss has hit a bit of a plateau  She is no longer on Paxil  Discussed starting Wellbutrin to help with cravings in the setting of stress and she was agreeable  - She denies history of seizures or glaucoma  - Reviewed the potential side effects of Wellbutrin, which include: abdominal upset, headache, dizziness, trouble sleeping, depression/anxiety, and fatigue  Patient should call/return if he/she develops symptoms of depression/aniety  Wellbutrin should be weaned rather than stopped abruptly  Wellbutrin can lower the seizure threshold  - Start Wellbutrin  mg daily  Initial: 220 lbs BMI 41 7  Current: 181 4 lbs BMI 35 43  Change: -38 6 lbs   Goal: 180 lbs    Goals:  Continue food logging, weighing and measuring  Keep up the great work with exercise  Keep up the great water intake  Continue nutrition recommendations per the dietician  6042-2298 calories, flex to 1600 calories on cardio days

## 2023-02-03 NOTE — PROGRESS NOTES
Assessment/Plan:     Obesity, Class II, BMI 35-39 9  - Patient is pursuing Conservative Program with bundles with the dietician  - She is not interested in weight loss surgery  - Initial weight loss goal of 5-10% weight loss for improved health   - She has been gradually losing weight, but more recently is struggling with cravings for chocolate in the setting of stress  She feels that weight loss has hit a bit of a plateau  She is no longer on Paxil  Discussed starting Wellbutrin to help with cravings in the setting of stress and she was agreeable  - She denies history of seizures or glaucoma  - Reviewed the potential side effects of Wellbutrin, which include: abdominal upset, headache, dizziness, trouble sleeping, depression/anxiety, and fatigue  Patient should call/return if he/she develops symptoms of depression/aniety  Wellbutrin should be weaned rather than stopped abruptly  Wellbutrin can lower the seizure threshold  - Start Wellbutrin  mg daily  Initial: 220 lbs BMI 41 7  Current: 181 4 lbs BMI 35 43  Change: -38 6 lbs   Goal: 180 lbs    Goals:  Continue food logging, weighing and measuring  Keep up the great work with exercise  Keep up the great water intake  Continue nutrition recommendations per the dietician  7113-0801 calories, flex to 1600 calories on cardio days  Anxiety  - Starting Wellbutrin  Continue management with prescribing provider  Corey Murray was seen today for follow-up  Diagnoses and all orders for this visit:    Obesity, Class II, BMI 35-39 9  -     buPROPion (Wellbutrin XL) 150 mg 24 hr tablet; Take 1 tablet (150 mg total) by mouth daily    Anxiety  -     buPROPion (Wellbutrin XL) 150 mg 24 hr tablet; Take 1 tablet (150 mg total) by mouth daily          Follow up in approximately 2 months with Non-Surgical Physician/Advanced Practitioner      Subjective:   Chief Complaint   Patient presents with   • Follow-up     MWM 2mth f/u; waist 40in       Patient ID: Carmita Zazueta  is a 56 y o  female with excess weight/obesity here to pursue weight management  Patient is pursuing Conservative Program with bundles with the dietician  Presents to the office visit with  Joe Harrison  Most recent notes and records were reviewed  HPI    Wt Readings from Last 10 Encounters:   02/03/23 82 3 kg (181 lb 6 4 oz)   01/13/23 83 kg (182 lb 15 7 oz)   12/02/22 86 5 kg (190 lb 9 6 oz)   11/11/22 87 kg (191 lb 12 8 oz)   10/07/22 90 4 kg (199 lb 6 4 oz)   09/21/22 92 2 kg (203 lb 3 2 oz)   08/31/22 93 1 kg (205 lb 3 2 oz)   08/31/22 92 kg (202 lb 11 5 oz)   07/27/22 96 kg (211 lb 9 6 oz)   07/15/22 96 kg (211 lb 11 2 oz)       Food logging: yes, typically getting around 1400 calories per day and sometimes up to 1600  Having some cravings for chocolate when feeling stress  Has been successfully losing weight, but feels that she has hit a bit of a plateau        B- greek yogurt and protein shake and blueberries   S- protein bar  L- carb balance wrap with chicken or turkey breast and tomato and spinach   S- none and Racquel mini around 70 calories   D- protein and veggies and 1/2 cup starch   S- frozen yogurt or fruit or protein bar or fairlife chocolate milk     Hydration- 125-150 oz water daily, occ green tea - black, occ fairlife chocolate milk   Alcohol- none  Exercise- 5 days per week treadmill for 40 minutes and often walks again later in day 4 days per week extra 3 miles     Sleep- 7 5 hours       Colonoscopy: UTD, due 2029  Mammogram: UTD, due April 2023      The following portions of the patient's history were reviewed and updated as appropriate: allergies, current medications, past family history, past medical history, past social history, past surgical history, and problem list     Family History   Problem Relation Age of Onset   • Rheum arthritis Mother    • Hypertension Mother    • Hyperlipidemia Mother    • Hepatitis Father         B   • Hyperlipidemia Father    • Hypertension Father    • Anxiety disorder Sister    • Depression Sister    • Lung cancer Maternal Grandmother         over [de-identified]   • Rheum arthritis Maternal Grandmother    • Cancer Maternal Grandmother         advanced, unknown primary   • Kidney disease Maternal Grandfather    • Anxiety disorder Paternal Grandmother    • Depression Paternal Grandmother    • Bipolar disorder Paternal Grandmother    • Lung cancer Paternal Grandmother         over [de-identified]   • Cancer Paternal Grandmother         lung   • Diabetes Paternal Grandfather    • Kidney disease Paternal Grandfather    • Breast cancer Maternal Aunt 34   • Heart disease Maternal Uncle    • Nephrolithiasis Family    • Colon cancer Neg Hx    • Ovarian cancer Neg Hx    • Uterine cancer Neg Hx    • Stroke Neg Hx    • Thyroid disease Neg Hx         Review of Systems   HENT: Negative for sore throat  Respiratory: Negative for cough and shortness of breath  Cardiovascular: Negative for chest pain and palpitations  Gastrointestinal: Negative for abdominal pain, constipation, diarrhea, nausea and vomiting  Denies GERD   Musculoskeletal: Negative for arthralgias and back pain  Skin: Negative for rash  Psychiatric/Behavioral: Negative for suicidal ideas (or HI)  Denies depression and anxiety       Objective:  /72   Pulse 82   Resp 16   Ht 5' (1 524 m)   Wt 82 3 kg (181 lb 6 4 oz)   BMI 35 43 kg/m²     Physical Exam  Vitals and nursing note reviewed  Constitutional   General appearance: Abnormal   well developed and obese  Eyes No conjunctival injection  Ears, Nose, Mouth, and Throat Oral mucosa moist    Pulmonary   Respiratory effort: No increased work of breathing or signs of respiratory distress  Cardiovascular     Examination of extremities for edema and/or varicosities: Normal   no edema  Abdomen   Abdomen: Abnormal   The abdomen was obese      Musculoskeletal   Normal range of motion  Neurological   Gait and station: Normal     Psychiatric   Orientation to person, place and time: Normal     Affect: appropriate

## 2023-03-17 ENCOUNTER — OFFICE VISIT (OUTPATIENT)
Dept: BARIATRICS | Facility: CLINIC | Age: 48
End: 2023-03-17

## 2023-03-17 VITALS — BODY MASS INDEX: 34.5 KG/M2 | HEIGHT: 60 IN | WEIGHT: 175.71 LBS

## 2023-03-17 DIAGNOSIS — R63.5 ABNORMAL WEIGHT GAIN: ICD-10-CM

## 2023-03-24 ENCOUNTER — APPOINTMENT (OUTPATIENT)
Dept: LAB | Facility: HOSPITAL | Age: 48
End: 2023-03-24

## 2023-03-24 DIAGNOSIS — E78.5 DYSLIPIDEMIA: ICD-10-CM

## 2023-03-24 DIAGNOSIS — E55.9 VITAMIN D DEFICIENCY: ICD-10-CM

## 2023-03-24 LAB
25(OH)D3 SERPL-MCNC: 28.6 NG/ML (ref 30–100)
CHOLEST SERPL-MCNC: 200 MG/DL
HDLC SERPL-MCNC: 44 MG/DL
LDLC SERPL CALC-MCNC: 139 MG/DL (ref 0–100)
TRIGL SERPL-MCNC: 83 MG/DL

## 2023-03-31 ENCOUNTER — APPOINTMENT (OUTPATIENT)
Dept: LAB | Facility: CLINIC | Age: 48
End: 2023-03-31

## 2023-03-31 ENCOUNTER — OFFICE VISIT (OUTPATIENT)
Dept: FAMILY MEDICINE CLINIC | Facility: CLINIC | Age: 48
End: 2023-03-31

## 2023-03-31 VITALS
SYSTOLIC BLOOD PRESSURE: 104 MMHG | WEIGHT: 175 LBS | HEIGHT: 60 IN | HEART RATE: 78 BPM | OXYGEN SATURATION: 98 % | BODY MASS INDEX: 34.36 KG/M2 | DIASTOLIC BLOOD PRESSURE: 68 MMHG

## 2023-03-31 DIAGNOSIS — G47.9 SLEEP DISTURBANCES: ICD-10-CM

## 2023-03-31 DIAGNOSIS — E58 DIETARY CALCIUM DEFICIENCY: ICD-10-CM

## 2023-03-31 DIAGNOSIS — R10.9 ABDOMINAL CRAMPS: ICD-10-CM

## 2023-03-31 DIAGNOSIS — F41.9 ANXIETY: ICD-10-CM

## 2023-03-31 DIAGNOSIS — N92.6 IRREGULAR MENSTRUAL CYCLE: ICD-10-CM

## 2023-03-31 DIAGNOSIS — Z87.42 HX OF OVARIAN CYST: ICD-10-CM

## 2023-03-31 DIAGNOSIS — E66.9 OBESITY, CLASS II, BMI 35-39.9: ICD-10-CM

## 2023-03-31 DIAGNOSIS — N92.6 LATE MENSES: ICD-10-CM

## 2023-03-31 DIAGNOSIS — E55.9 VITAMIN D DEFICIENCY: ICD-10-CM

## 2023-03-31 DIAGNOSIS — E78.5 DYSLIPIDEMIA: ICD-10-CM

## 2023-03-31 DIAGNOSIS — R10.31 RLQ ABDOMINAL PAIN: Primary | ICD-10-CM

## 2023-03-31 PROBLEM — R10.2 PELVIC PAIN: Status: ACTIVE | Noted: 2023-03-31

## 2023-03-31 LAB
HCG SERPL QL: NEGATIVE
SL AMB  POCT GLUCOSE, UA: NEGATIVE
SL AMB LEUKOCYTE ESTERASE,UA: NEGATIVE
SL AMB POCT BILIRUBIN,UA: NEGATIVE
SL AMB POCT BLOOD,UA: NEGATIVE
SL AMB POCT CLARITY,UA: CLEAR
SL AMB POCT COLOR,UA: NORMAL
SL AMB POCT KETONES,UA: NEGATIVE
SL AMB POCT NITRITE,UA: NEGATIVE
SL AMB POCT PH,UA: 7
SL AMB POCT SPECIFIC GRAVITY,UA: 1.01
SL AMB POCT URINE PROTEIN: NEGATIVE
SL AMB POCT UROBILINOGEN: 0.2

## 2023-03-31 NOTE — PROGRESS NOTES
Name: Dary Tang      : 1975      MRN: [id number]  Encounter Provider: Lexine Koyanagi, PA-C  Encounter Date: 3/31/2023   Encounter department: [facility] PRIMARY CARE    Assessment & Plan     1  RLQ abdominal pain  Assessment & Plan:  Urine in house normal  Do not suspect appendicitis as has been 2 weeks now but do suspect enlarged ovarian cyst as last US 2022 with R 3 cm simple cyst which could have continue to enlarge instead of resolving  Must rule out ectopic even though home pregnancy was negative  Check bhcg at lab on way out of office today  If it is a cyst it may burst and then discomfort will improve immediately with possible slight irritation from fluid explained to pt  Call with results  Orders:  -     US pelvis complete w transvaginal; Future; Expected date: 2023    2  Irregular menstrual cycle  Assessment & Plan:  Due to location of RLQ pain and previous US showing ovarian cyst in this location several months ago sent patient to complete beta hcg and pelvic ultrasound  Pain may be secondary to simple ovarian cyst vs ectopic pregnancy vs pregnancy  Advised to go to ER if the pain suddenly increases or worsens  3  Abdominal cramps  Assessment & Plan:  Cramping for 2 weeks, period is 1 week late  Pelvic pain of RLQ, tender to palpation  Pelvic US from this summer showed a simple ovarian cyst on the right  Completed POCT UA, negative for leukocytes, nitrites, and RBCs  Will send patient for US RLQ and beta hcg blood test      Orders:  -     POCT urine dip    4  Late menses  -     Pregnancy Test (HCG Qualitative)  -     US pelvis complete w transvaginal; Future; Expected date: 2023    5  Hx of ovarian cyst  -     US pelvis complete w transvaginal; Future; Expected date: 2023    6  Anxiety  Assessment & Plan:  Patient feels stable on current does of Wellbutrin 150mg  Stopped the Paxil        7  Vitamin D deficiency  Assessment & Plan:  Vitamin D was low at 28 6  Suggest daily supplementation of 4,000 iu of vitamin D instead of 2,000iu  Will recheck levels in 6 months  Orders:  -     Vitamin D 25 hydroxy; Future; Expected date: 09/30/2023    8  Dyslipidemia  Assessment & Plan:  LDL and HDL both increased on recent labs  Total cholesterol was 200  Discussed dietary and lifestyle changes to reduce LDL levels  Also discussed starting a statin medication to reach goal levels  Orders:  -     Lipid Panel with Direct LDL reflex; Future; Expected date: 09/30/2023  -     Comprehensive metabolic panel; Future; Expected date: 09/30/2023    9  Dietary calcium deficiency  Assessment & Plan:  Patient is currently taking only vitamin supplements, no calcium supplements  Check CMP prior to follow up visit  Orders:  -     Comprehensive metabolic panel; Future; Expected date: 09/30/2023    10  Obesity, Class II, BMI 35-39 9  Assessment & Plan:  Dieting and being very active  Lost 5 lbs in 1 5 months  11  Sleep disturbances  Assessment & Plan:  Isabela Garay is helping racing thoughts at night  Depression Screening and Follow-up Plan: Patient was screened for depression during today's encounter  They screened negative with a PHQ-2 score of 0  Natty Chapa is a 57 yo female presenting to the office today for her 6 month follow up accompainied by her   Started Wellbutrin 2 months ago, and tapered off of Paxil  Feels it is working well her for anxiety  If she takes it too early, has trouble falling asleep because it starts to wear off  She feels stable at her current dose  She has an acute complaint of abdominal cramping around 2 weeks  Describes it as cramping, originally thought she was getting her period  Bilateral lower quadrants, spreads to back  Pain is worse secondary to pressure prior to bowel movements, or with a full bladder  Fluctuates between 4-6/10, does not interrupt daily activities  Tender to touch RLQ   Her period is 1 week late  Patient did take a home pregnancy test on Sunday, which was negative  LMP was 2/24/23 was 6 days long  Periods have been regular since the summer  In the summer had a very long period, US completed showed a simple cyst of the right ovary  No other new medications  Has been working out more than normal   Also notes weakness in knees while climbing stairs, hard to get up from seated position  No history of kidney stones, dysuria, hematuria, no new vaginal discharge, constipation, diarrhea, nausea, numbness or tingling of legs  No UTI symptoms and has her appendix  Review of Systems   Constitutional: Negative for chills, fatigue and fever  Cardiovascular: Negative for leg swelling  Gastrointestinal: Positive for nausea  Negative for anal bleeding, blood in stool, constipation and diarrhea  Genitourinary: Positive for menstrual problem  Negative for dysuria, hematuria, vaginal bleeding and vaginal discharge  Musculoskeletal: Positive for back pain  Neurological: Positive for weakness  Negative for dizziness and numbness  Current Outpatient Medications on File Prior to Visit   Medication Sig   • b complex vitamins capsule Take 1 capsule by mouth daily   • buPROPion (Wellbutrin XL) 150 mg 24 hr tablet Take 1 tablet (150 mg total) by mouth daily   • Cholecalciferol (VITAMIN D3) 2000 units TABS Take 2,000 Units by mouth daily   • multivitamin (THERAGRAN) TABS Take 1 tablet by mouth daily   • triamcinolone (KENALOG) 0 1 % cream Apply topically 2 (two) times a day   • Zinc 50 MG TABS Take by mouth       Objective     /68 (BP Location: Left arm, Patient Position: Sitting, Cuff Size: Large)   Pulse 78   Ht 5' (1 524 m)   Wt 79 4 kg (175 lb)   SpO2 98%   BMI 34 18 kg/m²     Physical Exam  Vitals and nursing note reviewed  Constitutional:       General: She is not in acute distress  Appearance: Normal appearance  HENT:      Head: Normocephalic and atraumatic     Cardiovascular: Rate and Rhythm: Normal rate and regular rhythm  Pulses: Normal pulses  Heart sounds: Normal heart sounds  No murmur heard  No friction rub  No gallop  Pulmonary:      Effort: Pulmonary effort is normal       Breath sounds: Normal breath sounds  No wheezing, rhonchi or rales  Abdominal:      General: Abdomen is flat  Bowel sounds are normal  There is no distension  Palpations: Abdomen is soft  There is no mass  Tenderness: There is abdominal tenderness  There is no guarding or rebound  Hernia: No hernia is present  Comments: TTP right lower quadrant, and mild tenderness suprapubicly  Skin:     General: Skin is warm and dry  Neurological:      General: No focal deficit present  Mental Status: She is alert     Psychiatric:         Mood and Affect: Mood normal        Nayan Bearden PA-C

## 2023-03-31 NOTE — PATIENT INSTRUCTIONS
1  RLQ abdominal pain  Assessment & Plan:  Urine in house normal  Do not suspect appendicitis as has been 2 weeks now but do suspect enlarged ovarian cyst as last US 8/2022 with R 3 cm simple cyst which could have continue to enlarge instead of resolving  Must rule out ectopic even though home pregnancy was negative  Check bhcg at lab on way out of office today  If it is a cyst it may burst and then discomfort will improve immediately with possible slight irritation from fluid explained to pt  Call with results  Orders:  -     US pelvis complete w transvaginal; Future; Expected date: 03/31/2023    2  Irregular menstrual cycle  Assessment & Plan:  Due to location of RLQ pain and previous US showing ovarian cyst in this location several months ago sent patient to complete beta hcg and pelvic ultrasound  Pain may be secondary to simple ovarian cyst vs ectopic pregnancy vs pregnancy  Advised to go to ER if the pain suddenly increases or worsens  3  Abdominal cramps  Assessment & Plan:  Cramping for 2 weeks, period is 1 week late  Pelvic pain of RLQ, tender to palpation  Pelvic US from this summer showed a simple ovarian cyst on the right  Completed POCT UA, negative for leukocytes, nitrites, and RBCs  Will send patient for US RLQ and beta hcg blood test      Orders:  -     POCT urine dip    4  Late menses  -     Pregnancy Test (HCG Qualitative)  -     US pelvis complete w transvaginal; Future; Expected date: 03/31/2023    5  Hx of ovarian cyst  -     US pelvis complete w transvaginal; Future; Expected date: 03/31/2023    6  Anxiety  Assessment & Plan:  Patient feels stable on current does of Wellbutrin 150mg  Stopped the Paxil  7  Vitamin D deficiency  Assessment & Plan:  Vitamin D was low at 28 6  Suggest daily supplementation of 4,000 iu of vitamin D instead of 2,000iu  Will recheck levels in 6 months  Orders:  -     Vitamin D 25 hydroxy; Future; Expected date: 09/30/2023    8  Dyslipidemia  Assessment & Plan:  LDL and HDL both increased on recent labs  Total cholesterol was 200  Discussed dietary and lifestyle changes to reduce LDL levels  Also discussed starting a statin medication to reach goal levels  Orders:  -     Lipid Panel with Direct LDL reflex; Future; Expected date: 09/30/2023  -     Comprehensive metabolic panel; Future; Expected date: 09/30/2023    9  Dietary calcium deficiency  Assessment & Plan:  Patient is currently taking only vitamin supplements, no calcium supplements  Check CMP prior to follow up visit  Orders:  -     Comprehensive metabolic panel; Future; Expected date: 09/30/2023    10  Obesity, Class II, BMI 35-39 9  Assessment & Plan:  Dieting and being very active  Lost 5 lbs in 1 5 months  11  Sleep disturbances  Assessment & Plan:  Alex Vivar is helping racing thoughts at night

## 2023-03-31 NOTE — ASSESSMENT & PLAN NOTE
Vitamin D was low at 28 6  Suggest daily supplementation of 4,000 iu of vitamin D instead of 2,000iu  Will recheck levels in 6 months

## 2023-03-31 NOTE — ASSESSMENT & PLAN NOTE
LDL and HDL both increased on recent labs  Total cholesterol was 200  Discussed dietary and lifestyle changes to reduce LDL levels  Also discussed starting a statin medication to reach goal levels

## 2023-03-31 NOTE — ASSESSMENT & PLAN NOTE
Cramping for 2 weeks, period is 1 week late  Pelvic pain of RLQ, tender to palpation  Pelvic US from this summer showed a simple ovarian cyst on the right  Completed POCT UA, negative for leukocytes, nitrites, and RBCs   Will send patient for US RLQ and beta hcg blood test

## 2023-03-31 NOTE — ASSESSMENT & PLAN NOTE
Patient is currently taking only vitamin supplements, no calcium supplements  Check CMP prior to follow up visit

## 2023-03-31 NOTE — ASSESSMENT & PLAN NOTE
Due to location of RLQ pain and previous US showing ovarian cyst in this location several months ago sent patient to complete beta hcg and pelvic ultrasound  Pain may be secondary to simple ovarian cyst vs ectopic pregnancy vs pregnancy  Advised to go to ER if the pain suddenly increases or worsens

## 2023-03-31 NOTE — PROGRESS NOTES
Name: Estrellita Cole      : 1975      MRN: [id number]  Encounter Provider: Estrellita Aguila PA-C  Encounter Date: 3/31/2023   Encounter department: [facility] PRIMARY CARE    Assessment & Plan     1  RLQ abdominal pain  -     US pelvis complete w transvaginal; Future; Expected date: 2023    2  Irregular menstrual cycle  Assessment & Plan:  Due to location of RLQ pain and previous US showing ovarian cyst in this location several months ago sent patient to complete beta hcg and pelvic ultrasound  Pain may be secondary to simple ovarian cyst vs ectopic pregnancy vs pregnancy  Advised to go to ER if the pain suddenly increases or worsens  3  Abdominal cramps  Assessment & Plan:  Cramping for 2 weeks, period is 1 week late  Pelvic pain of RLQ, tender to palpation  Pelvic US from this summer showed a simple ovarian cyst on the right  Completed POCT UA, negative for leukocytes, nitrites, and RBCs  Will send patient for US RLQ and beta hcg blood test      Orders:  -     POCT urine dip    4  Late menses  -     Pregnancy Test (HCG Qualitative)  -     US pelvis complete w transvaginal; Future; Expected date: 2023    5  Hx of ovarian cyst  -     US pelvis complete w transvaginal; Future; Expected date: 2023    6  Anxiety  Assessment & Plan:  Patient feels stable on current does of Wellbutrin 150mg  Stopped the Paxil  7  Vitamin D deficiency  Assessment & Plan:  Vitamin D was low at 28 6  Suggest daily supplementation of 4,000 iu of vitamin D instead of 2,000iu  Will recheck levels in 6 months  Orders:  -     Vitamin D 25 hydroxy; Future; Expected date: 2023    8  Dyslipidemia  Assessment & Plan:  LDL and HDL both increased on recent labs  Total cholesterol was 200  Discussed dietary and lifestyle changes to reduce LDL levels  Also discussed starting a statin medication to reach goal levels  Orders:  -     Lipid Panel with Direct LDL reflex;  Future; Expected date: 09/30/2023  -     Comprehensive metabolic panel; Future; Expected date: 09/30/2023    9  Dietary calcium deficiency  Assessment & Plan:  Patient is currently taking only vitamin supplements, no calcium supplements  Check CMP prior to follow up visit  Orders:  -     Comprehensive metabolic panel; Future; Expected date: 09/30/2023    10  Obesity, Class II, BMI 35-39 9  Assessment & Plan:  Dieting and being very active  Lost 5 lbs in 1 5 months  11  Sleep disturbances  Assessment & Plan:  Trinh Crisostomo is helping racing thoughts at night              Subjective      HPI  Review of Systems    Current Outpatient Medications on File Prior to Visit   Medication Sig   • b complex vitamins capsule Take 1 capsule by mouth daily   • buPROPion (Wellbutrin XL) 150 mg 24 hr tablet Take 1 tablet (150 mg total) by mouth daily   • Cholecalciferol (VITAMIN D3) 2000 units TABS Take 2,000 Units by mouth daily   • multivitamin (THERAGRAN) TABS Take 1 tablet by mouth daily   • triamcinolone (KENALOG) 0 1 % cream Apply topically 2 (two) times a day   • Zinc 50 MG TABS Take by mouth       Objective     /68 (BP Location: Left arm, Patient Position: Sitting, Cuff Size: Large)   Pulse 78   Ht 5' (1 524 m)   Wt 79 4 kg (175 lb)   SpO2 98%   BMI 34 18 kg/m²     Physical Exam  Nirav Hartmann PA-C

## 2023-03-31 NOTE — ASSESSMENT & PLAN NOTE
Pelvic pain of RLQ, tender to palpation  Pelvic US from this summer showed a simple ovarian cyst on the right  Pain increases with full bladder pressure  Completed POCT UA, negative for leukocytes, nitrites, and RBCs  Due to location of RLQ pain and previous US showing ovarian cyst in this location several months ago sent patient to complete beta hcg and pelvic ultrasound  Advised to go to ER if the pain suddenly increases or worsens

## 2023-03-31 NOTE — ASSESSMENT & PLAN NOTE
Urine in house normal  Do not suspect appendicitis as has been 2 weeks now but do suspect enlarged ovarian cyst as last US 8/2022 with R 3 cm simple cyst which could have continue to enlarge instead of resolving  Must rule out ectopic even though home pregnancy was negative  Check bhcg at lab on way out of office today  If it is a cyst it may burst and then discomfort will improve immediately with possible slight irritation from fluid explained to pt  Call with results

## 2023-04-03 RX ORDER — BUPROPION HYDROCHLORIDE 150 MG/1
150 TABLET ORAL DAILY
Qty: 30 TABLET | Refills: 0 | Status: SHIPPED | OUTPATIENT
Start: 2023-04-03

## 2023-04-21 PROBLEM — E66.811 OBESITY, CLASS I, BMI 30-34.9: Status: ACTIVE | Noted: 2019-01-18

## 2023-04-29 ENCOUNTER — HOSPITAL ENCOUNTER (OUTPATIENT)
Dept: CT IMAGING | Facility: HOSPITAL | Age: 48
Discharge: HOME | End: 2023-04-29

## 2023-04-29 DIAGNOSIS — R10.31 RLQ ABDOMINAL PAIN: ICD-10-CM

## 2023-04-29 RX ADMIN — IOHEXOL 100 ML: 350 INJECTION, SOLUTION INTRAVENOUS at 14:29

## 2023-05-01 NOTE — RESULT ENCOUNTER NOTE
Can we see if pt can make apt to review the CT and go over how she is doing and decide what if any further f/u is necessary as it has been one month since her last exam  CT showed some incidental findings which need f/u in one year but are not cause of her discomfort

## 2023-05-05 ENCOUNTER — HOSPITAL ENCOUNTER (OUTPATIENT)
Dept: RADIOLOGY | Facility: HOSPITAL | Age: 48
Discharge: HOME | End: 2023-05-05

## 2023-05-05 ENCOUNTER — OFFICE VISIT (OUTPATIENT)
Dept: FAMILY MEDICINE CLINIC | Facility: CLINIC | Age: 48
End: 2023-05-05

## 2023-05-05 VITALS
WEIGHT: 172 LBS | BODY MASS INDEX: 33.77 KG/M2 | DIASTOLIC BLOOD PRESSURE: 72 MMHG | HEIGHT: 60 IN | HEART RATE: 72 BPM | TEMPERATURE: 98.1 F | SYSTOLIC BLOOD PRESSURE: 106 MMHG

## 2023-05-05 DIAGNOSIS — M54.16 LUMBAR RADICULOPATHY: ICD-10-CM

## 2023-05-05 DIAGNOSIS — R10.2 PELVIC PAIN: ICD-10-CM

## 2023-05-05 DIAGNOSIS — R10.31 RLQ ABDOMINAL PAIN: ICD-10-CM

## 2023-05-05 DIAGNOSIS — M15.9 PRIMARY OSTEOARTHRITIS INVOLVING MULTIPLE JOINTS: ICD-10-CM

## 2023-05-05 DIAGNOSIS — K86.2 PANCREATIC CYST: Primary | ICD-10-CM

## 2023-05-05 PROBLEM — R63.5 EXCESSIVE WEIGHT GAIN: Status: RESOLVED | Noted: 2019-01-08 | Resolved: 2023-05-05

## 2023-05-05 NOTE — ASSESSMENT & PLAN NOTE
Right lower quadrant pain continues 6 out of 10 waxing and waning for the past 2-1/2 months with negative work-up of blood work ultrasound CAT scan  At this point we need to rule out lumbar radiculitis check low spine x-ray as there were degenerative changes on CT found in the lumbar area, order for gastroenterology and general surgery-rule out hernia even though not apparent on exam or CT  Colonoscopy was completed with one 5 mm polyp less than 1 year ago

## 2023-05-05 NOTE — PROGRESS NOTES
Name: Peter aTveras      : 1975      MRN: [id number]  Encounter Provider: Deidre Espinosa PA-C  Encounter Date: 2023   Encounter department: [facility] PRIMARY CARE    Assessment & Plan     1  Pancreatic cyst  Assessment & Plan:  Found incidentally and should be checked yearly for 5 years with MRI abdomen with MRCP  Order placed  Of note patient's father has the same condition    Orders:  -     MRI abdomen w wo contrast and mrcp; Future; Expected date: 2024    2  RLQ abdominal pain  Assessment & Plan:  Right lower quadrant pain continues 6 out of 10 waxing and waning for the past 2-1/2 months with negative work-up of blood work ultrasound CAT scan  At this point we need to rule out lumbar radiculitis check low spine x-ray as there were degenerative changes on CT found in the lumbar area, order for gastroenterology and general surgery-rule out hernia even though not apparent on exam or CT  Colonoscopy was completed with one 5 mm polyp less than 1 year ago  Orders:  -     Ambulatory Referral to General Surgery; Future  -     Ambulatory Referral to Gastroenterology; Future    3  Lumbar radiculopathy  -     XR spine lumbar minimum 4 views non injury; Future; Expected date: 2023    4  Pelvic pain  Assessment & Plan:  Ultrasound showed ovarian cyst on the opposite side of concern on the left as patient is having right lower quadrant discomfort  5  Primary osteoarthritis involving multiple joints  Assessment & Plan:  Incidentally shown on CT of lumbar spine and hips  Subjective      PT here today to discuss her symptoms and review eval done so far  Current symptoms hurts more when sitting or lying down  Same as it was about one month ago when she last saw me       RLQ with a constant crampy discomfort which goes from front to back and tender to touch and pressure which is always there to some degree at most a 6 out of 10 but waxes and wanes throughout the day for the past 2 and half months  BM usually every morning  No nausea, appetite WNL  Patient has lost almost 60 pounds now in the past year on purpose  Colonoscopy done in 2022 with 1 less than 5 mm polyp and was told check in 10 years  Patient has no low back pain but CT does show evidence of degeneration in the lumbar spine  At this point her  cannot even lay his arm on her right hip as it is uncomfortable while sleeping  Review of Systems   Constitutional: Negative  HENT: Negative  Eyes: Negative  Respiratory: Negative  Cardiovascular: Negative  Gastrointestinal: Negative  Endocrine: Negative  Genitourinary: Positive for pelvic pain  Musculoskeletal: Negative  Skin: Negative  Allergic/Immunologic: Negative  Neurological: Negative  Hematological: Negative  Psychiatric/Behavioral: Negative  Current Outpatient Medications on File Prior to Visit   Medication Sig    b complex vitamins capsule Take 1 capsule by mouth daily    buPROPion (Wellbutrin XL) 150 mg 24 hr tablet Take 1 tablet (150 mg total) by mouth daily    Cholecalciferol (VITAMIN D3) 2000 units TABS Take 2,000 Units by mouth daily    multivitamin (THERAGRAN) TABS Take 1 tablet by mouth daily    triamcinolone (KENALOG) 0 1 % cream Apply topically 2 (two) times a day    Zinc 50 MG TABS Take by mouth       Objective     /72 (BP Location: Left arm, Patient Position: Sitting, Cuff Size: Adult)   Pulse 72   Temp 98 1 °F (36 7 °C) (Temporal)   Ht 5' (1 524 m) Comment: on file  Wt 78 kg (172 lb)   BMI 33 59 kg/m²     Physical Exam  Vitals and nursing note reviewed  Constitutional:       General: She is not in acute distress  Appearance: She is well-developed  She is not diaphoretic  HENT:      Head: Normocephalic and atraumatic  Eyes:      General:         Right eye: No discharge  Left eye: No discharge        Conjunctiva/sclera: Conjunctivae normal    Neck: Vascular: No carotid bruit  Cardiovascular:      Rate and Rhythm: Normal rate and regular rhythm  Heart sounds: Normal heart sounds  No murmur heard  No friction rub  No gallop  Pulmonary:      Effort: Pulmonary effort is normal  No respiratory distress  Breath sounds: Normal breath sounds  No wheezing or rales  Abdominal:      General: Abdomen is protuberant  Bowel sounds are normal       Palpations: Abdomen is soft  Tenderness: There is abdominal tenderness in the right lower quadrant  Musculoskeletal:      Cervical back: Neck supple  Skin:     General: Skin is warm and dry  Neurological:      Mental Status: She is alert and oriented to person, place, and time     Psychiatric:         Judgment: Judgment normal        Litzy Cisneros PA-C

## 2023-05-05 NOTE — PATIENT INSTRUCTIONS
1  Pancreatic cyst  Assessment & Plan:  Found incidentally and should be checked yearly for 5 years with MRI abdomen with MRCP  Order placed  Of note patient's father has the same condition    Orders:  -     MRI abdomen w wo contrast and mrcp; Future; Expected date: 05/05/2024    2  RLQ abdominal pain  Assessment & Plan:  Right lower quadrant pain continues 6 out of 10 waxing and waning for the past 2-1/2 months with negative work-up of blood work ultrasound CAT scan  At this point we need to rule out lumbar radiculitis check low spine x-ray as there were degenerative changes on CT found in the lumbar area, order for gastroenterology and general surgery-rule out hernia even though not apparent on exam or CT  Colonoscopy was completed with one 5 mm polyp less than 1 year ago  Orders:  -     Ambulatory Referral to General Surgery; Future  -     Ambulatory Referral to Gastroenterology; Future    3  Lumbar radiculopathy  -     XR spine lumbar minimum 4 views non injury; Future; Expected date: 05/05/2023    4  Pelvic pain  Assessment & Plan:  Ultrasound showed ovarian cyst on the opposite side of concern on the left as patient is having right lower quadrant discomfort  5  Primary osteoarthritis involving multiple joints  Assessment & Plan:  Incidentally shown on CT of lumbar spine and hips

## 2023-05-05 NOTE — ASSESSMENT & PLAN NOTE
Found incidentally and should be checked yearly for 5 years with MRI abdomen with MRCP  Order placed    Of note patient's father has the same condition

## 2023-05-05 NOTE — ASSESSMENT & PLAN NOTE
Ultrasound showed ovarian cyst on the opposite side of concern on the left as patient is having right lower quadrant discomfort

## 2023-05-11 DIAGNOSIS — M43.10 PARS DEFECT WITH SPONDYLOLISTHESIS: Primary | ICD-10-CM

## 2023-05-11 DIAGNOSIS — M43.00 PARS DEFECT WITH SPONDYLOLISTHESIS: Primary | ICD-10-CM

## 2023-05-11 NOTE — RESULT ENCOUNTER NOTE
Please let pt know that her lumbar xray shows some degeneration L4-5 with continued pars defect so I am going to order eval with pain management for consult and correlation  Thank you

## 2023-05-18 ENCOUNTER — CONSULT (OUTPATIENT)
Dept: SURGERY | Facility: CLINIC | Age: 48
End: 2023-05-18

## 2023-05-18 DIAGNOSIS — R10.31 RLQ ABDOMINAL PAIN: ICD-10-CM

## 2023-05-18 NOTE — PROGRESS NOTES
Assessment/Plan:    Diagnoses and all orders for this visit:    RLQ abdominal pain  -     Ambulatory Referral to General Surgery    No signs of hernia on physical examination  Very small umbilical hernia  CT would not explain her right lower quadrant/right groin symptoms  Suspect musculoskeletal in etiology  In use NSAIDs and warm soaks/showers for discomfort  Subjective:      Patient ID: Love Victoria is a 56 y o  female  Patient presents with abdominal pain  Had CT scan 5/1/23  Approximately 3 months ago began with some vague right lower quadrant right groin pain  Seems worse when sitting for long periods of time  Is relieved when she stands up and walks around  She will keep the books for her son's baseball games  By the end of the game she notices pain in the right lower quadrant right groin  Not related to food at all  She has not had any prior surgeries of her abdomen  Was evaluated by gynecology as she thought this might of been related to her menstrual cycles  She had both an exam and ultrasound which revealed only a dominant follicle on the right side but no specific reason for her pain  Denies any bladder or bowel issues  The following portions of the patient's history were reviewed and updated as appropriate:     She  has a past medical history of Abnormal Pap smear of cervix, Anxiety, Breast nodule (2009), Fibrocystic breast, GERD (gastroesophageal reflux disease), Hyperlipidemia, Migraine with aura and without status migrainosus, not intractable, Morbid obesity with BMI of 40 0-44 9, adult ([address] ), Peptic ulcer, and Polycystic ovary syndrome (3/19)  She  has a past surgical history that includes [place] tooth extraction; orthodontic treatment; Colonoscopy; and pr sphincterotomy anal division sphincter spx (N/A, 3/20/2020)    Her family history includes Anxiety disorder in her paternal grandmother and sister; Bipolar disorder in her paternal grandmother; Breast cancer in her maternal aunt; Breast cancer (age of onset: 29) in her maternal aunt; Cancer in her maternal aunt, maternal grandmother, and paternal grandmother; Depression in her paternal grandmother and sister; Diabetes in her paternal grandfather; Heart disease in her maternal uncle and maternal uncle; Hepatitis in her father; Hyperlipidemia in her father and mother; Hypertension in her father and mother; Kidney disease in her maternal grandfather and paternal grandfather; Lung cancer in her maternal grandmother and paternal grandmother; Nephrolithiasis in her family; Rheum arthritis in her maternal grandmother and mother  She  reports that she has never smoked  She has never used smokeless tobacco  She reports that she does not currently use alcohol  She reports that she does not use drugs  Current Outpatient Medications   Medication Sig Dispense Refill   • b complex vitamins capsule Take 1 capsule by mouth daily     • buPROPion (Wellbutrin XL) 150 mg 24 hr tablet Take 1 tablet (150 mg total) by mouth daily 30 tablet 2   • Cholecalciferol (VITAMIN D3) 2000 units TABS Take 2,000 Units by mouth daily     • multivitamin (THERAGRAN) TABS Take 1 tablet by mouth daily     • triamcinolone (KENALOG) 0 1 % cream Apply topically 2 (two) times a day 30 g 0   • Zinc 50 MG TABS Take by mouth       No current facility-administered medications for this visit  She is allergic to lipitor [atorvastatin], morphine, and bactrim [sulfamethoxazole-trimethoprim]       Review of Systems   Gastrointestinal: Positive for abdominal pain  All other systems reviewed and are negative  Objective: There were no vitals taken for this visit  Physical Exam  Constitutional:       General: She is not in acute distress  Appearance: She is not ill-appearing  HENT:      Head: Atraumatic  Mouth/Throat:      Mouth: Mucous membranes are moist    Abdominal:      General: Bowel sounds are normal  There is no distension  Palpations: Abdomen is soft  There is no mass  Tenderness: There is abdominal tenderness (More soreness noted in the right lower quadrant area as opposed to specific point tenderness  No rebound or guarding  No signs of inguinal nor femoral adenopathy)  Hernia: No hernia is present  Skin:     General: Skin is warm and dry  Neurological:      Mental Status: She is alert

## 2023-05-19 ENCOUNTER — HOSPITAL ENCOUNTER (OUTPATIENT)
Dept: MAMMOGRAPHY | Facility: MEDICAL CENTER | Age: 48
Discharge: HOME | End: 2023-05-19

## 2023-05-19 VITALS — WEIGHT: 171.96 LBS | BODY MASS INDEX: 33.76 KG/M2 | HEIGHT: 60 IN

## 2023-05-19 DIAGNOSIS — Z12.31 ENCOUNTER FOR SCREENING MAMMOGRAM FOR BREAST CANCER: ICD-10-CM

## 2023-06-09 ENCOUNTER — OFFICE VISIT (OUTPATIENT)
Dept: BARIATRICS | Facility: CLINIC | Age: 48
End: 2023-06-09

## 2023-06-09 VITALS — HEIGHT: 60 IN | WEIGHT: 164.9 LBS | BODY MASS INDEX: 32.38 KG/M2

## 2023-06-09 DIAGNOSIS — R63.5 ABNORMAL WEIGHT GAIN: Primary | ICD-10-CM

## 2023-06-09 PROCEDURE — RECHECK: Performed by: DIETITIAN, REGISTERED

## 2023-07-13 ENCOUNTER — OFFICE VISIT (OUTPATIENT)
Dept: GASTROENTEROLOGY | Facility: CLINIC | Age: 48
End: 2023-07-13
Payer: MEDICARE

## 2023-07-13 VITALS
WEIGHT: 169.4 LBS | HEIGHT: 60 IN | DIASTOLIC BLOOD PRESSURE: 64 MMHG | TEMPERATURE: 98 F | SYSTOLIC BLOOD PRESSURE: 102 MMHG | BODY MASS INDEX: 33.26 KG/M2

## 2023-07-13 DIAGNOSIS — R10.31 RLQ ABDOMINAL PAIN: ICD-10-CM

## 2023-07-13 PROCEDURE — 99244 OFF/OP CNSLTJ NEW/EST MOD 40: CPT | Performed by: PHYSICIAN ASSISTANT

## 2023-07-13 NOTE — Clinical Note
Hi, Dr. Jovita Montilla! How are you? I hope you are doing well. We have a mutual patient who was seeing [facility] Cunha. She has been having intermittent RLQ/pelvic pain that she says is correlated with her menstrual cycle. She says it began when she started having abnormal uterine bleeding and has slowly gotten better as the bleeding has also resolved. She has evidence of potential ovarian cyst on u/s, but unsure if this is the etiology. CT was negative for GI etiology and recent colonoscopy was essentially normal so we do not suspect it is GI in nature (no associated IG symptoms, as well). She was asking if I could reach out to you specifically to see if she can be seen by you only? If so: Please reach out to the pt whenever your team is able. Thank you!

## 2023-07-13 NOTE — PROGRESS NOTES
[facility] - Outpatient Follow-up Note  Juan Pablo Can 56 y.o. female MRN: [id number]  Encounter: [id number]          ASSESSMENT AND PLAN:      Karely Nelson is a 57 y/o female with GERD who presents for consultation of lower abdominal pain. 1. RLQ Pain, resolving   Pt recently underwent colonoscopy 12/2022 noted one polyp <5 mm, which was hyperplastic. She has evidence of dominant follicle VS ovarian cyst on transvaginal u/s, but she says she was told this was not the cause of her pain. However, she explains that the pain started when she was having abnormal uterine bleeding and has slowly gotten better as the bleeding hs gotten better. She also notes the pain would only occur during her menstrual cycle. Pt also saw gen surgery who did not suspect GI abnormality, as well. Pt wishes to see GYN again, but would like to be seen by Dr. Darien Thomson specifically. -explained to pt that I agree I do not suspect her pain was GI in etiology as it is not related to BMs, PO intake, CT with both IV and PO contrast was normal and colonoscopy was WNL; I suspect this is krunal-so GYN in etiology, jaleesa in relation to her other GYN symptoms that occurred simultaneously together   -I will send a message to Dr. Alexia Mooney team to see if they can fit the pt on to her schedule   -please call our office if the pain becomes more constant or severe     2. Pancreatic cyst   CT noted 7 mm cystic lesion in the pancreas  -PCP already ordered MRI/MRCP    ______________________________________________________________________    SUBJECTIVE:  Karely Nelson is a 57 y/o female with GERD who presents for consultation of lower abdominal pain. Pt says she is here to discuss RLQ pain that is actually resolving. She says many months ago, she started having abnormal uterine bleeding and RLQ/pelvic pain that was occuring for days milagro time when she would get her period.  She says it would eventually alleviate when her period was done, but then come back the next month at that time. She says that recently, her bleeding has been getting better and she has noticed her pain is following suit. She says it was never related to BMs or eating/drinking, did not occur with movement or activity, and denies: constipation or straining, diarrhea, upper abdominal pain, n/v, heartburn, dysphagia, unintentional weight loss, fevers, chills, bloating or gas, bloody or black BMs, frequent NSAID use. She denies any new meds or supplements, diet or activity changes or any surgeries prior to this. REVIEW OF SYSTEMS IS OTHERWISE NEGATIVE.       Historical Information   Past Medical History:   Diagnosis Date   • Abnormal Pap smear of cervix     2016 pap/hpv negative; 1/2021 pap/hpv negative   • Anxiety    • Breast nodule 2009    Fibroglandular changes   • Fibrocystic breast    • GERD (gastroesophageal reflux disease)    • Hyperlipidemia    • Migraine with aura and without status migrainosus, not intractable    • Morbid obesity with BMI of 40.0-44.9, adult ([address])    • Peptic ulcer    • Polycystic ovary syndrome 3/19    patient has always gotten monthly periods     Past Surgical History:   Procedure Laterality Date   • COLONOSCOPY     • ORTHODONTIC TREATMENT      16 yo; incisors pulled down   • DE SPHINCTEROTOMY ANAL DIVISION SPHINCTER SPX N/A 3/20/2020    Procedure: LEFT PARTIAL SPHINCTEROTOMY; AND FISSURECTOMY;  Surgeon: Brady Torres MD;  Location: [address] [place] [address] OR;  Service: Colorectal   • WISDOM TOOTH EXTRACTION       Social History   Social History     Substance and Sexual Activity   Alcohol Use Not Currently    Comment: Less than a drink a week, very rarely     Social History     Substance and Sexual Activity   Drug Use No     Social History     Tobacco Use   Smoking Status Never   Smokeless Tobacco Never     Family History   Problem Relation Age of Onset   • Rheum arthritis Mother    • Hypertension Mother    • Hyperlipidemia Mother    • Hepatitis Father         B   • Hyperlipidemia Father    • Hypertension Father    • Anxiety disorder Sister    • Depression Sister    • Lung cancer Maternal Grandmother         over 80   • Rheum arthritis Maternal Grandmother    • Cancer Maternal Grandmother         advanced, unknown primary   • Kidney disease Maternal Grandfather    • Anxiety disorder Paternal Grandmother    • Depression Paternal Grandmother    • Bipolar disorder Paternal Grandmother    • Lung cancer Paternal Grandmother         over 80   • Cancer Paternal Grandmother         lung   • Diabetes Paternal Grandfather    • Kidney disease Paternal Grandfather    • Breast cancer additional onset Maternal Aunt    • Breast cancer Maternal Aunt 34   • Heart disease Maternal Uncle    • Heart disease Maternal Uncle    • Nephrolithiasis Family    • Colon cancer Neg Hx    • Ovarian cancer Neg Hx    • Uterine cancer Neg Hx    • Stroke Neg Hx    • Thyroid disease Neg Hx        Meds/Allergies       Current Outpatient Medications:   •  b complex vitamins capsule  •  buPROPion (Wellbutrin XL) 150 mg 24 hr tablet  •  Cholecalciferol (VITAMIN D3) 2000 units TABS  •  multivitamin (THERAGRAN) TABS  •  triamcinolone (KENALOG) 0.1 % cream  •  Zinc 50 MG TABS    Allergies   Allergen Reactions   • Lipitor [Atorvastatin] Myalgia   • Morphine    • Bactrim [Sulfamethoxazole-Trimethoprim] Rash           Objective     not currently breastfeeding. There is no height or weight on file to calculate BMI. PHYSICAL EXAM:      General Appearance:   Alert, cooperative, no distress   HEENT:   Normocephalic, atraumatic, anicteric.     Neck:  Supple, symmetrical, trachea midline   Lungs:   Clear to auscultation bilaterally; no rales, rhonchi or wheezing; respirations unlabored    Heart[de-identified]   Regular rate and rhythm; no murmur, rub, or gallop.    Abdomen:   Soft, non-tender, non-distended; normal bowel sounds; no masses, no organomegaly    Genitalia:   Deferred    Rectal:   Deferred    Extremities:  No cyanosis, clubbing or edema  Pulses:  2+ and symmetric    Skin:  No jaundice, rashes, or lesions    Lymph nodes:  No palpable cervical lymphadenopathy        Lab Results:   No visits with results within 1 Day(s) from this visit. Latest known visit with results is:   Office Visit on 03/31/2023   Component Date Value   • Preg, Serum 03/31/2023 Negative    • LEUKOCYTE ESTERASE,UA 03/31/2023 Negative    • NITRITE,UA 03/31/2023 Negative    • SL AMB POCT UROBILINOGEN 03/31/2023 0.2    • POCT URINE PROTEIN 03/31/2023 Negative    •  PH,UA 03/31/2023 7.0    • BLOOD,UA 03/31/2023 Negative    • SPECIFIC GRAVITY,UA 03/31/2023 1.010    • KETONES,UA 03/31/2023 Negative    • BILIRUBIN,UA 03/31/2023 Negative    • GLUCOSE, UA 03/31/2023 Negative    •  COLOR,UA 03/31/2023 Bright Yellow    • CLARITY,UA 03/31/2023 Clear          Radiology Results:   No results found.

## 2023-07-18 ENCOUNTER — TELEPHONE (OUTPATIENT)
Dept: OBGYN CLINIC | Facility: CLINIC | Age: 48
End: 2023-07-18

## 2023-07-18 NOTE — TELEPHONE ENCOUNTER
----- Message from Juan Daniel Saldaña MD sent at 7/14/2023  2:27 PM EDT -----  Please schedule a problem visit with me--see message from GI below  You can aim for 8/10 or 8/11 if there are still slots available.   ----- Message -----  From: David Franks PA-C  Sent: 7/13/2023   9:55 AM EDT  To: Juan Daniel Saldaña MD    Hi, Dr. Froy Blair! How are you? I hope you are doing well. We have a mutual patient who was seeing [employer]. She has been having intermittent RLQ/pelvic pain that she says is correlated with her menstrual cycle. She says it began when she started having abnormal uterine bleeding and has slowly gotten better as the bleeding has also resolved. She has evidence of potential ovarian cyst on u/s, but unsure if this is the etiology. CT was negative for GI etiology and recent colonoscopy was essentially normal so we do not suspect it is GI in nature (no associated IG symptoms, as well). She was asking if I could reach out to you specifically to see if she can be seen by you only? If so: Please reach out to the pt whenever your team is able. Thank you!

## 2023-07-20 ENCOUNTER — OFFICE VISIT (OUTPATIENT)
Dept: BARIATRICS | Facility: CLINIC | Age: 48
End: 2023-07-20
Payer: MEDICARE

## 2023-07-20 VITALS
HEIGHT: 60 IN | BODY MASS INDEX: 33.34 KG/M2 | SYSTOLIC BLOOD PRESSURE: 102 MMHG | WEIGHT: 169.8 LBS | RESPIRATION RATE: 16 BRPM | DIASTOLIC BLOOD PRESSURE: 70 MMHG | HEART RATE: 85 BPM

## 2023-07-20 DIAGNOSIS — E66.9 OBESITY, CLASS I, BMI 30-34.9: ICD-10-CM

## 2023-07-20 DIAGNOSIS — E66.9 OBESITY (BMI 30.0-34.9): Primary | ICD-10-CM

## 2023-07-20 DIAGNOSIS — F41.9 ANXIETY: ICD-10-CM

## 2023-07-20 PROCEDURE — 99213 OFFICE O/P EST LOW 20 MIN: CPT | Performed by: NURSE PRACTITIONER

## 2023-07-20 RX ORDER — BUPROPION HYDROCHLORIDE 150 MG/1
150 TABLET ORAL DAILY
Qty: 30 TABLET | Refills: 3 | Status: SHIPPED | OUTPATIENT
Start: 2023-07-20

## 2023-07-20 NOTE — PROGRESS NOTES
Assessment/Plan:     Obesity, Class I, BMI 30-34.9  - Patient is pursuing Conservative Program with bundles with the dietician. - She is not interested in weight loss surgery. - Some form of birth control recommended while on weight loss medications. - Initial weight loss goal of 5-10% weight loss for improved health    - She denies history of seizures or glaucoma. - Wellbutrin started 2/3/2023 at weight of 181.4 lbs to help with appetite and cravings. - Continue Wellbutrin  mg daily. Some nausea/vomiting with change in , but this has resolved and she is tolerating it well. Helping with appetite. No change in weight, but consider this a win, as under more stress recently and was traveling. Discussed potentially increasing dose of Wellbutrin, but will continue current dose and consider increasing in the future if needed. - Will be getting routine labs through primary care. Initial: 220 lbs BMI 41.7  Last visit: 169.8 lbs BMI 33.16  Current: 169.8 lbs BMI 33.16  Change: -50.2 lbs (no change since the last office visit)  Goal: 180 lbs - met    Goals:  Continue food logging, weighing and measuring. Keep up the great work with exercise  Keep up the great work with water intake. Continue nutrition recommendations per the dietician. 0968-2009 calories, flex to 1600 calories on cardio days. Continue Wellbutrin. Anxiety  - Taking Wellbutrin. Continue management with prescribing provider. Karely Nelson was seen today for follow-up. Diagnoses and all orders for this visit:    Obesity (BMI 30.0-34.9)  -     buPROPion (Wellbutrin XL) 150 mg 24 hr tablet; Take 1 tablet (150 mg total) by mouth daily    Anxiety  -     buPROPion (Wellbutrin XL) 150 mg 24 hr tablet; Take 1 tablet (150 mg total) by mouth daily    Obesity, Class I, BMI 30-34.9          Follow up in approximately 4 months with Non-Surgical Physician/Advanced Practitioner.     Subjective:   Chief Complaint   Patient presents with   • Follow-up     MWM 3m f/u; Waist-37in       Patient ID: Aliyah Guaman  is a 56 y.o. female with excess weight/obesity here to pursue weight management. Patient is pursuing Conservative Program with bundles with the dietician. Presents to the office visit with  Micky Downing. Most recent notes and records were reviewed. HPI    Wt Readings from Last 10 Encounters:   07/20/23 77 kg (169 lb 12.8 oz)   07/13/23 76.8 kg (169 lb 6.4 oz)   06/09/23 74.8 kg (164 lb 14.4 oz)   05/19/23 78 kg (171 lb 15.3 oz)   05/05/23 78 kg (172 lb)   04/21/23 77 kg (169 lb 12.8 oz)   03/31/23 79.4 kg (175 lb)   03/17/23 79.7 kg (175 lb 11.4 oz)   02/03/23 82.3 kg (181 lb 6.4 oz)   01/13/23 83 kg (182 lb 15.7 oz)       Taking Wellbutrin  mg daily. Obtained refill recently and  changed. Had some nausea and vomiting when taking the new refill, but has revolved and denies any current negative side effects. Helping with appetite. Food logging: yes, typically getting around 6846-0409 calories per day. Has been under increased stress - both in-laws fell and suffered injuries. [place] [id number] [place] and was not eating as she typically does. Weight has stayed the same and she is happy with that considering the vacation and recent stress.       Hydration- 125 oz water daily, occ green tea - black, occ fairlife chocolate milk   Alcohol- none  Exercise- 5 days per week treadmill for 40 minutes and often walks again later in day 4 days per week extra 3 miles.    Tobacco- denies  Sleep- 7.5 hours       Colonoscopy: UTD, due 2029  Mammogram: UTD, due May 2024        The following portions of the patient's history were reviewed and updated as appropriate: allergies, current medications, past family history, past medical history, past social history, past surgical history, and problem list.    Family History   Problem Relation Age of Onset   • Rheum arthritis Mother    • Hypertension Mother • Hyperlipidemia Mother    • Hepatitis Father         B   • Hyperlipidemia Father    • Hypertension Father    • Anxiety disorder Sister    • Depression Sister    • Lung cancer Maternal Grandmother         over 80   • Rheum arthritis Maternal Grandmother    • Cancer Maternal Grandmother         advanced, unknown primary   • Kidney disease Maternal Grandfather    • Anxiety disorder Paternal Grandmother    • Depression Paternal Grandmother    • Bipolar disorder Paternal Grandmother    • Lung cancer Paternal Grandmother         over 80   • Cancer Paternal Grandmother         lung   • Diabetes Paternal Grandfather    • Kidney disease Paternal Grandfather    • Breast cancer additional onset Maternal Aunt    • Breast cancer Maternal Aunt 34   • Heart disease Maternal Uncle    • Heart disease Maternal Uncle    • Nephrolithiasis Family    • Colon cancer Neg Hx    • Ovarian cancer Neg Hx    • Uterine cancer Neg Hx    • Stroke Neg Hx    • Thyroid disease Neg Hx         Review of Systems   HENT: Negative for sore throat. Respiratory: Negative for cough and shortness of breath. Cardiovascular: Negative for chest pain and palpitations. Gastrointestinal: Negative for abdominal pain, constipation, diarrhea, nausea and vomiting. Denies GERD   Musculoskeletal: Negative for arthralgias and back pain. Skin: Negative for rash. Psychiatric/Behavioral: Negative for suicidal ideas (or HI). Denies depression and anxiety       Objective:  /70   Pulse 85   Resp 16   Ht 5' (1.524 m)   Wt 77 kg (169 lb 12.8 oz)   BMI 33.16 kg/m²     Physical Exam  Vitals and nursing note reviewed. Constitutional   General appearance: Abnormal.  well developed and obese. Eyes No conjunctival injection. Ears, Nose, Mouth, and Throat Oral mucosa moist.   Pulmonary   Respiratory effort: No increased work of breathing or signs of respiratory distress.      Cardiovascular     Examination of extremities for edema and/or varicosities: Normal.  no edema. Abdomen   Abdomen: Abnormal.  The abdomen was obese.     Musculoskeletal   Normal range of motion  Neurological   Gait and station: Normal.    Psychiatric   Orientation to person, place and time: Normal.    Affect: appropriate

## 2023-07-20 NOTE — ASSESSMENT & PLAN NOTE
- Patient is pursuing Conservative Program with bundles with the dietician. - She is not interested in weight loss surgery. - Some form of birth control recommended while on weight loss medications. - Initial weight loss goal of 5-10% weight loss for improved health    - She denies history of seizures or glaucoma. - Wellbutrin started 2/3/2023 at weight of 181.4 lbs to help with appetite and cravings. - Continue Wellbutrin  mg daily. Some nausea/vomiting with change in , but this has resolved and she is tolerating it well. Helping with appetite. No change in weight, but consider this a win, as under more stress recently and was traveling. Discussed potentially increasing dose of Wellbutrin, but will continue current dose and consider increasing in the future if needed. - Will be getting routine labs through primary care. Initial: 220 lbs BMI 41.7  Last visit: 169.8 lbs BMI 33.16  Current: 169.8 lbs BMI 33.16  Change: -50.2 lbs (no change since the last office visit)  Goal: 180 lbs - met    Goals:  Continue food logging, weighing and measuring. Keep up the great work with exercise  Keep up the great work with water intake. Continue nutrition recommendations per the dietician. 0625-3284 calories, flex to 1600 calories on cardio days. Continue Wellbutrin.

## 2023-09-22 ENCOUNTER — OFFICE VISIT (OUTPATIENT)
Dept: BARIATRICS | Facility: CLINIC | Age: 48
End: 2023-09-22

## 2023-09-22 VITALS — BODY MASS INDEX: 34.87 KG/M2 | WEIGHT: 177.6 LBS | HEIGHT: 60 IN

## 2023-09-22 DIAGNOSIS — R63.5 ABNORMAL WEIGHT GAIN: ICD-10-CM

## 2023-09-22 PROCEDURE — WEIGHT

## 2023-09-22 PROCEDURE — RECHECK

## 2023-09-30 ENCOUNTER — APPOINTMENT (OUTPATIENT)
Dept: LAB | Facility: HOSPITAL | Age: 48
End: 2023-09-30
Payer: MEDICARE

## 2023-09-30 DIAGNOSIS — E55.9 VITAMIN D DEFICIENCY: ICD-10-CM

## 2023-09-30 DIAGNOSIS — E78.5 DYSLIPIDEMIA: ICD-10-CM

## 2023-09-30 DIAGNOSIS — E58 DIETARY CALCIUM DEFICIENCY: ICD-10-CM

## 2023-09-30 LAB
25(OH)D3 SERPL-MCNC: 32 NG/ML (ref 30–100)
ALBUMIN SERPL BCP-MCNC: 4.3 G/DL (ref 3.5–5)
ALP SERPL-CCNC: 54 U/L (ref 34–104)
ALT SERPL W P-5'-P-CCNC: 16 U/L (ref 7–52)
ANION GAP SERPL CALCULATED.3IONS-SCNC: 4 MMOL/L
AST SERPL W P-5'-P-CCNC: 14 U/L (ref 13–39)
BILIRUB SERPL-MCNC: 0.77 MG/DL (ref 0.2–1)
BUN SERPL-MCNC: 16 MG/DL (ref 5–25)
CALCIUM SERPL-MCNC: 9.4 MG/DL (ref 8.4–10.2)
CHLORIDE SERPL-SCNC: 105 MMOL/L (ref 96–108)
CHOLEST SERPL-MCNC: 212 MG/DL
CO2 SERPL-SCNC: 28 MMOL/L (ref 21–32)
CREAT SERPL-MCNC: 0.83 MG/DL (ref 0.6–1.3)
GFR SERPL CREATININE-BSD FRML MDRD: 84 ML/MIN/1.73SQ M
GLUCOSE P FAST SERPL-MCNC: 94 MG/DL (ref 65–99)
HDLC SERPL-MCNC: 49 MG/DL
LDLC SERPL CALC-MCNC: 141 MG/DL (ref 0–100)
POTASSIUM SERPL-SCNC: 4.4 MMOL/L (ref 3.5–5.3)
PROT SERPL-MCNC: 7.2 G/DL (ref 6.4–8.4)
SODIUM SERPL-SCNC: 137 MMOL/L (ref 135–147)
TRIGL SERPL-MCNC: 108 MG/DL

## 2023-09-30 PROCEDURE — 80061 LIPID PANEL: CPT

## 2023-09-30 PROCEDURE — 82306 VITAMIN D 25 HYDROXY: CPT

## 2023-09-30 PROCEDURE — 36415 COLL VENOUS BLD VENIPUNCTURE: CPT

## 2023-09-30 PROCEDURE — 80053 COMPREHEN METABOLIC PANEL: CPT

## 2023-10-09 NOTE — RESULT ENCOUNTER NOTE
Please let pt know her Vit D level is now normal. Her bad LDL is still high and actually slightly higher than it was at 141 with a low good cholesterol. We had discussed starting a low dose med for her cholesterol when order was placed. If she is still OK with this I would call in Zetia 10 mg daily which is not a statin and check labs in 3-4 months.

## 2023-10-21 ENCOUNTER — PATIENT MESSAGE (OUTPATIENT)
Dept: BARIATRICS | Facility: CLINIC | Age: 48
End: 2023-10-21

## 2023-10-21 DIAGNOSIS — E66.9 OBESITY (BMI 30.0-34.9): Primary | ICD-10-CM

## 2023-10-21 DIAGNOSIS — F41.9 ANXIETY: ICD-10-CM

## 2023-10-23 DIAGNOSIS — E78.5 DYSLIPIDEMIA: Primary | ICD-10-CM

## 2023-10-23 RX ORDER — BUPROPION HYDROCHLORIDE 300 MG/1
300 TABLET ORAL EVERY MORNING
Qty: 30 TABLET | Refills: 2 | Status: SHIPPED | OUTPATIENT
Start: 2023-10-23

## 2023-10-23 RX ORDER — EZETIMIBE 10 MG/1
10 TABLET ORAL DAILY
Qty: 30 TABLET | Refills: 3 | Status: SHIPPED | OUTPATIENT
Start: 2023-10-23

## 2023-12-01 ENCOUNTER — OFFICE VISIT (OUTPATIENT)
Dept: BARIATRICS | Facility: CLINIC | Age: 48
End: 2023-12-01
Payer: MEDICARE

## 2023-12-01 VITALS
BODY MASS INDEX: 36.48 KG/M2 | HEIGHT: 60 IN | DIASTOLIC BLOOD PRESSURE: 70 MMHG | HEART RATE: 88 BPM | SYSTOLIC BLOOD PRESSURE: 116 MMHG | WEIGHT: 185.8 LBS | RESPIRATION RATE: 16 BRPM

## 2023-12-01 DIAGNOSIS — E66.09 CLASS 2 OBESITY DUE TO EXCESS CALORIES WITH BODY MASS INDEX (BMI) OF 36.0 TO 36.9 IN ADULT: Primary | ICD-10-CM

## 2023-12-01 DIAGNOSIS — E66.9 OBESITY (BMI 30-39.9): ICD-10-CM

## 2023-12-01 DIAGNOSIS — E78.5 DYSLIPIDEMIA: ICD-10-CM

## 2023-12-01 DIAGNOSIS — F41.9 ANXIETY: ICD-10-CM

## 2023-12-01 PROCEDURE — 99214 OFFICE O/P EST MOD 30 MIN: CPT | Performed by: NURSE PRACTITIONER

## 2023-12-01 RX ORDER — BUPROPION HYDROCHLORIDE 300 MG/1
300 TABLET ORAL EVERY MORNING
Qty: 30 TABLET | Refills: 3 | Status: SHIPPED | OUTPATIENT
Start: 2023-12-01

## 2023-12-01 RX ORDER — NALTREXONE HYDROCHLORIDE 50 MG/1
TABLET, FILM COATED ORAL
Qty: 30 TABLET | Refills: 3 | Status: SHIPPED | OUTPATIENT
Start: 2023-12-01

## 2023-12-01 NOTE — ASSESSMENT & PLAN NOTE
- Patient is pursuing Conservative Program with bundles with the dietician. - She is not interested in weight loss surgery. - Some form of birth control recommended while on weight loss medications. - Initial weight loss goal of 5-10% weight loss for improved health    - She denies history of seizures or glaucoma. - Wellbutrin started 2/3/2023 at weight of 181.4 lbs to help with appetite and cravings. - Continue Wellbutrin  mg daily. Tolerating well and helping with appetite and mood. Has had some weight regain, but relates this to an increase in stress with work and has not been walking as much. She will be getting help at work and her schedule with likely improve. - Discussed adding in Naltrexone to Wellbutrin to mimic Contrave and she was agreeable. - Start Naltrexone 50 mg, take 1/2 tablet daily by mouth for 2 weeks, then take 1/2 tablet twice a day. - Side effects of naltrexone discussed: nausea, vomiting, diarrhea, constipation, headache, anxiety, and confusion. Advised not to consume alcohol with naltrexone. Must be discontinued prior to surgery. - Labs reviewed: CMP and lipid 9/30/2023. Chol and LDL increased, HDL low, which will all likely improve with weight loss. Remainder of the blood work within acceptable range. Initial: 220 lbs BMI 41.7  Last visit: 169.8 lbs BMI 33.16  Current: 185.8 lbs BMI 36.90  Change: -34 lbs (+16 lbs since the last office visit)  Goal: 180 lbs     Goals:  Continue food logging, weighing and measuring. Keep up the great work with exercise  Keep up the great work with water intake, at least 64 oz daily. Continue nutrition recommendations per the dietician. 3003-5301 calories, flex to 1600 calories on cardio days. Continue Wellbutrin. Start naltrexone.

## 2023-12-01 NOTE — PATIENT INSTRUCTIONS
Start Naltrexone 50 mg, take 1/2 tablet by mouth daily in the morning for 2 weeks, then take 1/2 tablet twice a day. - Side effects of naltrexone discussed: nausea, vomiting, diarrhea, constipation, headache, anxiety, and confusion. Advised not to consume alcohol with naltrexone. Must be discontinued prior to surgery.

## 2023-12-01 NOTE — ASSESSMENT & PLAN NOTE
- Taking Zetia. May improve with weight loss and lifestyle modification. Continue management with prescribing provider.

## 2023-12-01 NOTE — PROGRESS NOTES
Assessment/Plan:     Class 2 obesity due to excess calories with body mass index (BMI) of 36.0 to 36.9 in adult  - Patient is pursuing Conservative Program with bundles with the dietician. - She is not interested in weight loss surgery. - Some form of birth control recommended while on weight loss medications. - Initial weight loss goal of 5-10% weight loss for improved health    - She denies history of seizures or glaucoma. - Wellbutrin started 2/3/2023 at weight of 181.4 lbs to help with appetite and cravings. - Continue Wellbutrin  mg daily. Tolerating well and helping with appetite and mood. Has had some weight regain, but relates this to an increase in stress with work and has not been walking as much. She will be getting help at work and her schedule with likely improve. - Discussed adding in Naltrexone to Wellbutrin to mimic Contrave and she was agreeable. - Start Naltrexone 50 mg, take 1/2 tablet daily by mouth for 2 weeks, then take 1/2 tablet twice a day. - Side effects of naltrexone discussed: nausea, vomiting, diarrhea, constipation, headache, anxiety, and confusion. Advised not to consume alcohol with naltrexone. Must be discontinued prior to surgery. - Labs reviewed: CMP and lipid 9/30/2023. Chol and LDL increased, HDL low, which will all likely improve with weight loss. Remainder of the blood work within acceptable range. Initial: 220 lbs BMI 41.7  Last visit: 169.8 lbs BMI 33.16  Current: 185.8 lbs BMI 36.90  Change: -34 lbs (+16 lbs since the last office visit)  Goal: 180 lbs     Goals:  Continue food logging, weighing and measuring. Keep up the great work with exercise  Keep up the great work with water intake, at least 64 oz daily. Continue nutrition recommendations per the dietician. 3614-2476 calories, flex to 1600 calories on cardio days. Continue Wellbutrin. Start naltrexone. Dyslipidemia  - Taking Zetia.  May improve with weight loss and lifestyle modification. Continue management with prescribing provider. Anxiety  - Taking Wellbutrin. Continue management with prescribing provider. Stephanie Valle was seen today for follow-up. Diagnoses and all orders for this visit:    Class 2 obesity due to excess calories with body mass index (BMI) of 36.0 to 36.9 in adult    Obesity (BMI 30-39.9)  -     buPROPion (Wellbutrin XL) 300 mg 24 hr tablet; Take 1 tablet (300 mg total) by mouth every morning  -     naltrexone (REVIA) 50 mg tablet; Take 1/2 tablet by mouth daily in the morning for 2 weeks, then take 1/2 tablet twice a day. Anxiety  -     buPROPion (Wellbutrin XL) 300 mg 24 hr tablet; Take 1 tablet (300 mg total) by mouth every morning    Dyslipidemia          Follow up in approximately  4 months  with Non-Surgical Physician/Advanced Practitioner. Subjective:   Chief Complaint   Patient presents with    Follow-up     MW- 4mt f/u- Waist 41in       Patient ID: Sierra Strickland  is a 53 y.o. female with excess weight/obesity here to pursue weight management. Patient is pursuing Conservative Program with bundles with the dietician. Presents to the office visit with  Nuno Mccoy. Most recent notes and records were reviewed. HPI    Wt Readings from Last 10 Encounters:   12/01/23 84.3 kg (185 lb 12.8 oz)   09/22/23 80.6 kg (177 lb 9.6 oz)   07/20/23 77 kg (169 lb 12.8 oz)   07/13/23 76.8 kg (169 lb 6.4 oz)   06/09/23 74.8 kg (164 lb 14.4 oz)   05/19/23 78 kg (171 lb 15.3 oz)   05/05/23 78 kg (172 lb)   04/21/23 77 kg (169 lb 12.8 oz)   03/31/23 79.4 kg (175 lb)   03/17/23 79.7 kg (175 lb 11.4 oz)         Taking Wellbutrin  mg daily. No negative side effects. Helping with mood and appetite. Has been under more stress recently. Has been working 12 hour days at work due to not having enough staff. Someone was recently hired and her hours should improve in the near future.  Due to working long hours, feels exhausted when coming home and has not been taking the second walk she typically takes. Food logging: yes, getting around 7832-8978 calories per day. Hydration- 125 oz water daily, occ green tea - black, occ fairlife chocolate milk   Alcohol- none  Exercise- walking on treadmill for 40 minutes daily    Tobacco- denies  Sleep- 7.5 hours       Colonoscopy: UTD, due 2029  Mammogram: UTD, due May 2024        The following portions of the patient's history were reviewed and updated as appropriate: allergies, current medications, past family history, past medical history, past social history, past surgical history, and problem list.    Family History   Problem Relation Age of Onset    Rheum arthritis Mother     Hypertension Mother     Hyperlipidemia Mother     Hepatitis Father         B    Hyperlipidemia Father     Hypertension Father     Anxiety disorder Sister     Depression Sister     Lung cancer Maternal Grandmother         over 80    Rheum arthritis Maternal Grandmother     Cancer Maternal Grandmother         advanced, unknown primary    Kidney disease Maternal Grandfather     Anxiety disorder Paternal Grandmother     Depression Paternal Grandmother     Bipolar disorder Paternal Grandmother     Lung cancer Paternal Grandmother         over 80    Cancer Paternal Grandmother         lung    Diabetes Paternal Grandfather     Kidney disease Paternal Grandfather     Breast cancer additional onset Maternal Aunt     Breast cancer Maternal Aunt 29    Heart disease Maternal Uncle     Heart disease Maternal Uncle     Nephrolithiasis Family     Colon cancer Neg Hx     Ovarian cancer Neg Hx     Uterine cancer Neg Hx     Stroke Neg Hx     Thyroid disease Neg Hx         Review of Systems   HENT:  Negative for sore throat. Respiratory:  Negative for cough and shortness of breath. Cardiovascular:  Negative for chest pain and palpitations. Gastrointestinal:  Negative for abdominal pain, constipation, diarrhea, nausea and vomiting. Denies GERD   Musculoskeletal:  Negative for arthralgias and back pain. Skin:  Negative for rash. Psychiatric/Behavioral:  Negative for suicidal ideas (or HI). Denies depression and anxiety       Objective:  /70 (BP Location: Left arm, Patient Position: Sitting)   Pulse 88   Resp 16   Ht 4' 11.5" (1.511 m)   Wt 84.3 kg (185 lb 12.8 oz)   BMI 36.90 kg/m²     Physical Exam  Vitals and nursing note reviewed. Constitutional   General appearance: Abnormal.  well developed and obese. Eyes No conjunctival injection. Ears, Nose, Mouth, and Throat Oral mucosa moist.   Pulmonary   Respiratory effort: No increased work of breathing or signs of respiratory distress. Cardiovascular     Examination of extremities for edema and/or varicosities: Normal.  no edema. Abdomen   Abdomen: Abnormal.  The abdomen was obese.     Musculoskeletal   Normal range of motion  Neurological   Gait and station: Normal.    Psychiatric   Orientation to person, place and time: Normal.    Affect: appropriate

## 2024-01-03 ENCOUNTER — TELEPHONE (OUTPATIENT)
Dept: FAMILY MEDICINE CLINIC | Facility: CLINIC | Age: 49
End: 2024-01-03

## 2024-01-03 DIAGNOSIS — N28.89 NODULE OF KIDNEY: Primary | ICD-10-CM

## 2024-01-03 NOTE — TELEPHONE ENCOUNTER
Please call the patient and let her know that we were contacted by [facility]'s radiology department and on her last CAT scan of the abdomen and pelvis for workup of her right lower quadrant pain they did find a 1 cm left renal nodule that they thought was a benign lipoma but did recommend a renal specific ultrasound to be done in 6 months.   She does have a follow-up MRI for her pancreatic finding but not for this ultrasound so I did place an order for this ultrasound and she can schedule this at any time.  Thank you.

## 2024-01-05 ENCOUNTER — OFFICE VISIT (OUTPATIENT)
Dept: BARIATRICS | Facility: CLINIC | Age: 49
End: 2024-01-05

## 2024-01-05 VITALS — HEIGHT: 60 IN | BODY MASS INDEX: 37.05 KG/M2 | WEIGHT: 188.72 LBS

## 2024-01-05 DIAGNOSIS — R63.5 ABNORMAL WEIGHT GAIN: Primary | ICD-10-CM

## 2024-01-05 PROCEDURE — RECHECK

## 2024-01-05 PROCEDURE — WEIGHT

## 2024-01-26 ENCOUNTER — HOSPITAL ENCOUNTER (OUTPATIENT)
Dept: ULTRASOUND IMAGING | Facility: HOSPITAL | Age: 49
Discharge: HOME | End: 2024-01-26
Payer: MEDICARE

## 2024-01-26 DIAGNOSIS — N28.89 NODULE OF KIDNEY: ICD-10-CM

## 2024-01-26 PROCEDURE — 76775 US EXAM ABDO BACK WALL LIM: CPT

## 2024-02-05 DIAGNOSIS — N28.89 NODULE OF KIDNEY: Primary | ICD-10-CM

## 2024-02-12 DIAGNOSIS — E78.5 DYSLIPIDEMIA: ICD-10-CM

## 2024-02-12 RX ORDER — EZETIMIBE 10 MG/1
10 TABLET ORAL DAILY
Qty: 30 TABLET | Refills: 0 | Status: SHIPPED | OUTPATIENT
Start: 2024-02-12

## 2024-03-10 DIAGNOSIS — E78.5 DYSLIPIDEMIA: ICD-10-CM

## 2024-03-10 RX ORDER — EZETIMIBE 10 MG/1
10 TABLET ORAL DAILY
Qty: 30 TABLET | Refills: 0 | Status: SHIPPED | OUTPATIENT
Start: 2024-03-10

## 2024-04-05 DIAGNOSIS — F41.9 ANXIETY: ICD-10-CM

## 2024-04-05 DIAGNOSIS — E66.9 OBESITY (BMI 30-39.9): ICD-10-CM

## 2024-04-05 RX ORDER — BUPROPION HYDROCHLORIDE 300 MG/1
300 TABLET ORAL EVERY MORNING
Qty: 30 TABLET | Refills: 0 | Status: SHIPPED | OUTPATIENT
Start: 2024-04-05 | End: 2024-04-12 | Stop reason: SDUPTHER

## 2024-04-08 DIAGNOSIS — E78.5 DYSLIPIDEMIA: ICD-10-CM

## 2024-04-09 RX ORDER — EZETIMIBE 10 MG/1
10 TABLET ORAL DAILY
Qty: 30 TABLET | Refills: 0 | Status: SHIPPED | OUTPATIENT
Start: 2024-04-09

## 2024-04-12 ENCOUNTER — OFFICE VISIT (OUTPATIENT)
Dept: BARIATRICS | Facility: CLINIC | Age: 49
End: 2024-04-12
Payer: MEDICARE

## 2024-04-12 VITALS
BODY MASS INDEX: 36.05 KG/M2 | DIASTOLIC BLOOD PRESSURE: 70 MMHG | HEIGHT: 60 IN | SYSTOLIC BLOOD PRESSURE: 116 MMHG | WEIGHT: 183.6 LBS | TEMPERATURE: 98.2 F | RESPIRATION RATE: 16 BRPM | HEART RATE: 80 BPM

## 2024-04-12 DIAGNOSIS — Z13.1 SCREENING FOR DIABETES MELLITUS: ICD-10-CM

## 2024-04-12 DIAGNOSIS — E78.5 DYSLIPIDEMIA: ICD-10-CM

## 2024-04-12 DIAGNOSIS — E66.9 OBESITY (BMI 30-39.9): ICD-10-CM

## 2024-04-12 DIAGNOSIS — E66.9 OBESITY, CLASS II, BMI 35-39.9: Primary | ICD-10-CM

## 2024-04-12 DIAGNOSIS — F41.9 ANXIETY: ICD-10-CM

## 2024-04-12 DIAGNOSIS — Z12.31 ENCOUNTER FOR SCREENING MAMMOGRAM FOR MALIGNANT NEOPLASM OF BREAST: ICD-10-CM

## 2024-04-12 PROBLEM — E66.812 OBESITY, CLASS II, BMI 35-39.9: Status: ACTIVE | Noted: 2024-04-12

## 2024-04-12 PROCEDURE — 99214 OFFICE O/P EST MOD 30 MIN: CPT | Performed by: NURSE PRACTITIONER

## 2024-04-12 RX ORDER — BUPROPION HYDROCHLORIDE 300 MG/1
300 TABLET ORAL EVERY MORNING
Qty: 30 TABLET | Refills: 4 | Status: SHIPPED | OUTPATIENT
Start: 2024-04-12

## 2024-04-12 NOTE — PROGRESS NOTES
Assessment/Plan:     Obesity, Class II, BMI 35-39.9  - Patient is pursuing Conservative Program with bundles with the dietician.  - She is not interested in weight loss surgery.  - Some form of birth control recommended while on weight loss medications.   - Initial weight loss goal of 5-10% weight loss for improved health-met    - She denies history of seizures or glaucoma.   - Wellbutrin started 2/3/2023 at weight of 181.4 lbs to help with appetite and cravings.   - Continue Wellbutrin  mg daily. Tolerating well and helping with appetite and mood.  - Naltrexone added Dec 2023 at weight of 185.3 lbs. Tolerating well, but no difference with appetite/cravings, will discontinue.   - Discussed alternative medications: GLP-1 therapy, phentermine, and Alek. Avoid Topamax as not currently using contraception. We will hold off on adding any other medication for weight loss at this time, but can reconsider in the future if needed.    - Check A1C, fasting insulin, and TSH.        Initial: 220 lbs BMI 41.7  Last visit: 185.8 lbs BMI 36.90  Current: 183.6 lbs BMI 35.86  Change: -36 lbs (-2 lbs since the last office visit)  Goal: 180 lbs      Goals:  Continue food logging, weighing and measuring.   Keep up the great work with exercise  Keep up the great work with water intake, at least 64 oz daily.  Continue nutrition recommendations per the dietician.  3040-2077 calories, flex to 1600 calories on cardio days.  Continue Wellbutrin.   Stop naltrexone.        Dyslipidemia  - Taking Zetia. May improve with weight loss and lifestyle modification. Continue management with prescribing provider.          Mey was seen today for follow-up.    Diagnoses and all orders for this visit:    Obesity, Class II, BMI 35-39.9  -     TSH, 3rd generation with Free T4 reflex; Future  -     Hemoglobin A1C; Future  -     Insulin, fasting; Future    Encounter for screening mammogram for malignant neoplasm of breast  -     Mammo screening  bilateral w 3d & cad; Future    Screening for diabetes mellitus  -     Hemoglobin A1C; Future  -     Insulin, fasting; Future    Anxiety  -     buPROPion (WELLBUTRIN XL) 300 mg 24 hr tablet; Take 1 tablet (300 mg total) by mouth every morning    Obesity (BMI 30-39.9)  -     buPROPion (WELLBUTRIN XL) 300 mg 24 hr tablet; Take 1 tablet (300 mg total) by mouth every morning    Dyslipidemia            Follow up in approximately  4 months  with Non-Surgical Physician/Advanced Practitioner.    Subjective:   Chief Complaint   Patient presents with    Follow-up     MWM- 4mt F/u       Patient ID: Nicole A Harada  is a 48 y.o. female with excess weight/obesity here to pursue weight management.  Patient is pursuing Conservative Program with bundles with the dietician. Presents to the office visit with  Kiel.  Most recent notes and records were reviewed.    HPI    Wt Readings from Last 10 Encounters:   04/12/24 83.3 kg (183 lb 9.6 oz)   01/05/24 85.6 kg (188 lb 11.5 oz)   12/01/23 84.3 kg (185 lb 12.8 oz)   09/22/23 80.6 kg (177 lb 9.6 oz)   07/20/23 77 kg (169 lb 12.8 oz)   07/13/23 76.8 kg (169 lb 6.4 oz)   06/09/23 74.8 kg (164 lb 14.4 oz)   05/19/23 78 kg (171 lb 15.3 oz)   05/05/23 78 kg (172 lb)   04/21/23 77 kg (169 lb 12.8 oz)         Taking Wellbutrin  mg daily. No negative side effects. Helping with mood and appetite.     Taking Naltrexone 50 mg, 1/2 tab twice a day. No negative side effects. Unsure if helping with appetite.     Has help at work now and stress level has greatly improved.      Food logging: yes, getting around 4626-3142 calories per day. Watching protein. Tried to reduce calories lower to around 1000 per day, but felt too hungry.         Hydration- 125 oz water daily, occ green tea - black, occ fairlife chocolate milk   Alcohol- rare wine  Exercise- 2 walks per day each for 50 minutes, daily yoga and weight training in the morning     Tobacco- denies  Sleep- 7.5 hours        Colonoscopy: UTD, due 2029  Mammogram: UTD, due May 2024 - order placed       The following portions of the patient's history were reviewed and updated as appropriate: allergies, current medications, past family history, past medical history, past social history, past surgical history, and problem list.    Family History   Problem Relation Age of Onset    Rheum arthritis Mother     Hypertension Mother     Hyperlipidemia Mother     Hepatitis Father         B    Hyperlipidemia Father     Hypertension Father     Anxiety disorder Sister     Depression Sister     Lung cancer Maternal Grandmother         over 80    Rheum arthritis Maternal Grandmother     Cancer Maternal Grandmother         advanced, unknown primary    Kidney disease Maternal Grandfather     Anxiety disorder Paternal Grandmother     Depression Paternal Grandmother     Bipolar disorder Paternal Grandmother     Lung cancer Paternal Grandmother         over 80    Cancer Paternal Grandmother         lung    Diabetes Paternal Grandfather     Kidney disease Paternal Grandfather     Breast cancer additional onset Maternal Aunt     Breast cancer Maternal Aunt 34    Heart disease Maternal Uncle     Heart disease Maternal Uncle     Nephrolithiasis Family     Colon cancer Neg Hx     Ovarian cancer Neg Hx     Uterine cancer Neg Hx     Stroke Neg Hx     Thyroid disease Neg Hx         Review of Systems   HENT:  Negative for sore throat.    Respiratory:  Negative for cough and shortness of breath.    Cardiovascular:  Negative for chest pain and palpitations.   Gastrointestinal:  Positive for abdominal pain (menstrual). Negative for constipation, diarrhea, nausea and vomiting.        Denies GERD   Musculoskeletal:  Positive for back pain (menstrual). Negative for arthralgias.   Skin:  Negative for rash.   Psychiatric/Behavioral:  Negative for suicidal ideas (or HI).         Denies depression and anxiety       Objective:  /70   Pulse 80   Temp 98.2 °F (36.8 °C)    Resp 16   Ht 5' (1.524 m)   Wt 83.3 kg (183 lb 9.6 oz)   BMI 35.86 kg/m²     Physical Exam  Vitals and nursing note reviewed.        Constitutional   General appearance: Abnormal.  well developed and obese.   Eyes No conjunctival injection.   Ears, Nose, Mouth, and Throat Oral mucosa moist.   Pulmonary   Respiratory effort: No increased work of breathing or signs of respiratory distress.     Cardiovascular     Examination of extremities for edema and/or varicosities: Normal.  no edema.   Abdomen   Abdomen: Abnormal.  The abdomen was obese.    Musculoskeletal   Normal range of motion  Neurological   Gait and station: Normal.    Psychiatric   Orientation to person, place and time: Normal.    Affect: appropriate

## 2024-04-12 NOTE — ASSESSMENT & PLAN NOTE
- Patient is pursuing Conservative Program with bundles with the dietician.  - She is not interested in weight loss surgery.  - Some form of birth control recommended while on weight loss medications.   - Initial weight loss goal of 5-10% weight loss for improved health-met    - She denies history of seizures or glaucoma.   - Wellbutrin started 2/3/2023 at weight of 181.4 lbs to help with appetite and cravings.   - Continue Wellbutrin  mg daily. Tolerating well and helping with appetite and mood.  - Naltrexone added Dec 2023 at weight of 185.3 lbs. Tolerating well, but no difference with appetite/cravings, will discontinue.   - Discussed alternative medications: GLP-1 therapy, phentermine, and Alek. Avoid Topamax as not currently using contraception. We will hold off on adding any other medication for weight loss at this time, but can reconsider in the future if needed.    - Check A1C, fasting insulin, and TSH.        Initial: 220 lbs BMI 41.7  Last visit: 185.8 lbs BMI 36.90  Current: 183.6 lbs BMI 35.86  Change: -36 lbs (-2 lbs since the last office visit)  Goal: 180 lbs      Goals:  Continue food logging, weighing and measuring.   Keep up the great work with exercise  Keep up the great work with water intake, at least 64 oz daily.  Continue nutrition recommendations per the dietician.  5993-1941 calories, flex to 1600 calories on cardio days.  Continue Wellbutrin.   Stop naltrexone.

## 2024-04-17 DIAGNOSIS — E66.09 CLASS 2 OBESITY DUE TO EXCESS CALORIES WITH BODY MASS INDEX (BMI) OF 36.0 TO 36.9 IN ADULT: Primary | ICD-10-CM

## 2024-04-17 RX ORDER — NALTREXONE HYDROCHLORIDE 50 MG/1
TABLET, FILM COATED ORAL
Qty: 30 TABLET | Refills: 2 | Status: SHIPPED | OUTPATIENT
Start: 2024-04-17

## 2024-05-03 ENCOUNTER — CLINICAL SUPPORT (OUTPATIENT)
Dept: BARIATRICS | Facility: CLINIC | Age: 49
End: 2024-05-03

## 2024-05-03 VITALS — HEIGHT: 60 IN | BODY MASS INDEX: 35.47 KG/M2 | WEIGHT: 180.67 LBS

## 2024-05-03 DIAGNOSIS — R63.5 ABNORMAL WEIGHT GAIN: Primary | ICD-10-CM

## 2024-05-03 PROCEDURE — WEIGHT

## 2024-05-03 PROCEDURE — RECHECK

## 2024-05-07 DIAGNOSIS — E78.5 DYSLIPIDEMIA: ICD-10-CM

## 2024-05-07 RX ORDER — EZETIMIBE 10 MG/1
10 TABLET ORAL DAILY
Qty: 30 TABLET | Refills: 0 | Status: SHIPPED | OUTPATIENT
Start: 2024-05-07

## 2024-05-07 NOTE — TELEPHONE ENCOUNTER
Please let the patient know that no further refills will be given from this point forward unless she does her blood work as she has been on this new medication since October and was supposed to have blood work in January. Thank you.

## 2024-06-17 ENCOUNTER — OFFICE VISIT (OUTPATIENT)
Dept: FAMILY MEDICINE CLINIC | Facility: CLINIC | Age: 49
End: 2024-06-17
Payer: MEDICARE

## 2024-06-17 VITALS
WEIGHT: 187 LBS | HEART RATE: 70 BPM | HEIGHT: 60 IN | TEMPERATURE: 98.8 F | DIASTOLIC BLOOD PRESSURE: 70 MMHG | BODY MASS INDEX: 36.71 KG/M2 | SYSTOLIC BLOOD PRESSURE: 118 MMHG

## 2024-06-17 DIAGNOSIS — E55.9 VITAMIN D DEFICIENCY: Primary | ICD-10-CM

## 2024-06-17 DIAGNOSIS — E66.09 CLASS 2 OBESITY DUE TO EXCESS CALORIES WITH BODY MASS INDEX (BMI) OF 36.0 TO 36.9 IN ADULT: ICD-10-CM

## 2024-06-17 DIAGNOSIS — E78.5 DYSLIPIDEMIA: ICD-10-CM

## 2024-06-17 PROCEDURE — 99213 OFFICE O/P EST LOW 20 MIN: CPT | Performed by: PHYSICIAN ASSISTANT

## 2024-06-17 NOTE — PROGRESS NOTES
Ambulatory Visit  Name: Nicole A Harada      : 1975      MRN: [id number]  Encounter Provider: Elizabeth Velez PA-C  Encounter Date: 2024   Encounter department: [facility]    Assessment & Plan   1. Vitamin D deficiency  Assessment & Plan:  Last checked in September within normal limits continue vitamin D supplementation.   2. Dyslipidemia  Assessment & Plan:  Patient is taking Zetia 10 mg due for fasting lipid panel.  Order reprinted call with results.        History of Present Illness   {Disappearing Hyperlinks I Encounters * My Last Note * Since Last Visit * History :95686}  Patient presents with:  Follow-up: Pt states it has been awhile since being seen, admits to not having labs done. Does not offer any complaints, does not need any refills             Review of Systems   Constitutional: Negative.    HENT: Negative.     Eyes: Negative.    Respiratory: Negative.     Cardiovascular: Negative.    Gastrointestinal: Negative.    Endocrine: Negative.    Genitourinary: Negative.    Musculoskeletal: Negative.    Skin: Negative.    Allergic/Immunologic: Negative.    Neurological: Negative.    Hematological: Negative.    Psychiatric/Behavioral: Negative.         Objective   {Disappearing Hyperlinks   Review Vitals * Enter New Vitals * Results Review * Labs * Imaging * Cardiology * Procedures * Lung Cancer Screening :01929}  /70 (BP Location: Left arm, Patient Position: Sitting, Cuff Size: Adult)   Pulse 70   Temp 98.8 °F (37.1 °C) (Temporal)   Ht 5' (1.524 m)   Wt 84.8 kg (187 lb)   BMI 36.52 kg/m²     Physical Exam  Vitals and nursing note reviewed.   Constitutional:       Appearance: Normal appearance. She is well-developed.   HENT:      Head: Normocephalic and atraumatic.   Eyes:      General: Lids are normal.      Conjunctiva/sclera: Conjunctivae normal.      Pupils: Pupils are equal, round, and reactive to light.   Cardiovascular:      Rate and Rhythm: Normal rate and  regular rhythm.      Heart sounds: No murmur heard.  Pulmonary:      Effort: Pulmonary effort is normal.      Breath sounds: Normal breath sounds.   Skin:     General: Skin is warm and dry.   Neurological:      General: No focal deficit present.      Mental Status: She is alert.      Coordination: Coordination is intact.   Psychiatric:         Mood and Affect: Mood normal.         Behavior: Behavior normal. Behavior is cooperative.         Thought Content: Thought content normal.         Judgment: Judgment normal.       Administrative Statements {Disappearing Hyperlinks I  [id number]}

## 2024-06-17 NOTE — PATIENT INSTRUCTIONS
1. Vitamin D deficiency  Assessment & Plan:  Last checked in September within normal limits continue vitamin D supplementation.   2. Dyslipidemia  Assessment & Plan:  Patient is taking Zetia 10 mg due for fasting lipid panel.  Order reprinted call with results.

## 2024-06-18 ENCOUNTER — HOSPITAL ENCOUNTER (OUTPATIENT)
Dept: MAMMOGRAPHY | Facility: MEDICAL CENTER | Age: 49
Discharge: HOME | End: 2024-06-18
Payer: MEDICARE

## 2024-06-18 VITALS — BODY MASS INDEX: 36.71 KG/M2 | WEIGHT: 187 LBS | HEIGHT: 60 IN

## 2024-06-18 DIAGNOSIS — Z12.31 ENCOUNTER FOR SCREENING MAMMOGRAM FOR MALIGNANT NEOPLASM OF BREAST: ICD-10-CM

## 2024-06-18 PROCEDURE — 77067 SCR MAMMO BI INCL CAD: CPT

## 2024-06-18 PROCEDURE — 77063 BREAST TOMOSYNTHESIS BI: CPT

## 2024-06-18 RX ORDER — NALTREXONE HYDROCHLORIDE 50 MG/1
TABLET, FILM COATED ORAL
Qty: 30 TABLET | Refills: 1 | Status: SHIPPED | OUTPATIENT
Start: 2024-06-18

## 2024-06-27 ENCOUNTER — HOSPITAL ENCOUNTER (OUTPATIENT)
Dept: ULTRASOUND IMAGING | Facility: CLINIC | Age: 49
Discharge: HOME | End: 2024-06-27
Payer: MEDICARE

## 2024-06-27 DIAGNOSIS — R92.8 ABNORMAL MAMMOGRAM: ICD-10-CM

## 2024-06-27 PROCEDURE — 76642 ULTRASOUND BREAST LIMITED: CPT

## 2024-07-01 ENCOUNTER — OFFICE VISIT (OUTPATIENT)
Dept: OBGYN CLINIC | Facility: CLINIC | Age: 49
End: 2024-07-01
Payer: MEDICARE

## 2024-07-01 VITALS
HEIGHT: 60 IN | WEIGHT: 187 LBS | DIASTOLIC BLOOD PRESSURE: 68 MMHG | BODY MASS INDEX: 36.71 KG/M2 | SYSTOLIC BLOOD PRESSURE: 110 MMHG

## 2024-07-01 DIAGNOSIS — Z12.31 ENCOUNTER FOR SCREENING MAMMOGRAM FOR BREAST CANCER: ICD-10-CM

## 2024-07-01 DIAGNOSIS — Z11.51 SCREENING FOR HPV (HUMAN PAPILLOMAVIRUS): ICD-10-CM

## 2024-07-01 DIAGNOSIS — Z01.419 ENCOUNTER FOR GYNECOLOGICAL EXAMINATION WITHOUT ABNORMAL FINDING: Primary | ICD-10-CM

## 2024-07-01 DIAGNOSIS — Z12.4 ENCOUNTER FOR PAPANICOLAOU SMEAR FOR CERVICAL CANCER SCREENING: ICD-10-CM

## 2024-07-01 PROCEDURE — G0476 HPV COMBO ASSAY CA SCREEN: HCPCS | Performed by: NURSE PRACTITIONER

## 2024-07-01 PROCEDURE — G0145 SCR C/V CYTO,THINLAYER,RESCR: HCPCS | Performed by: NURSE PRACTITIONER

## 2024-07-01 PROCEDURE — 99396 PREV VISIT EST AGE 40-64: CPT | Performed by: NURSE PRACTITIONER

## 2024-07-01 NOTE — PATIENT INSTRUCTIONS
Patient Education     Calcium Rich Diet   About this topic   Calcium is one of the most important minerals and is found in almost all parts of your body. It makes your teeth and bones strong and healthy. Your body does not make calcium. It is important for you to eat calcium rich foods to get enough calcium. It also helps your body:  Make blood clots  Keep your heartbeat normal  Helps blood move throughout the body  Release hormones  Release enzymes  Send and get signals between your nerves and brain  Make muscles work well         What will the results be?   It is important to have a good amount of calcium in your food. Calcium helps your body work well. It is very important during every life stage. Calcium may also keep you from losing bone mass. This is osteopenia. It may help keep you from having weak bones. This is osteoporosis.  What drugs may be needed?   The doctor may order calcium and vitamin D supplements. You need vitamin D to help your body take in the calcium. Your calcium supplement may have vitamin D in it.  Talk to your doctor about:  If it is OK to take your calcium with food.  How often to take your calcium supplement throughout the day.  All the drugs you are taking. Be sure to include all prescription and over-the-counter (OTC) drugs, and herbal supplements. Tell the doctor about any drug allergy. Bring a list of drugs you take with you. Some drugs may cause problems with how your body takes in calcium.  Will physical activity be limited?   No, physical activities will not be limited. It is good for you to do light exercises and to stay active.  What changes to diet are needed?   You will need to watch how much calcium is in the foods you eat. Your doctor will talk to you about the right amount of calcium for you. How much calcium you need is based on your age and life stage.  When is this diet used?   This diet is used when your normal diet is low in calcium. It is needed to raise the amount of  calcium in your diet. Our bodies do not take in calcium well as we get older.  Who should not use this diet?   People with too much calcium in their blood should not use this diet. This is called hypercalcemia.  What foods are good to eat?   Milk, yogurt, and cheese products are naturally high in calcium.  These foods have smaller amounts of calcium. If they are eaten often and in large amounts they can be good sources of calcium:  Oysters; dried fruit like figs and raisins; green leafy vegetables like broccoli, collards, kale, mustard greens, bok armando; soy beans; oranges  Beasley and sardines. Be sure to eat the soft bones.  Nuts like almonds and Brazil nuts, sunflower seeds, tahini, dried beans  Food products with calcium added to them like orange juice, tofu, cereal, bread; almond milk, rice milk, soy milk  What foods should be limited or avoided?   People who eat many kinds of foods do not have to be worried about limiting or avoiding certain foods.   What problems could happen?   Some people may get kidney stones. This may happen after taking high amounts of calcium over a long time. Calcium from food does not seem to cause kidney stones.  Hard stools.  Too much calcium may interfere with your body’s ability to absorb iron and zinc.  When do I need to call the doctor?   Call your doctor if you have concerns about your diet. Tell your doctor if you are allergic to any of the foods in your diet.  Helpful tips   If you have problems digesting milk, try lactose-free milk. You may also use a product to help you take in lactose.  Talk with your doctor if you are a vegetarian and do not eat dairy products. Your doctor can help you make sure you get the amount of calcium your body needs.  Last Reviewed Date   2021-03-12  Consumer Information Use and Disclaimer   This generalized information is a limited summary of diagnosis, treatment, and/or medication information. It is not meant to be comprehensive and should be used  as a tool to help the user understand and/or assess potential diagnostic and treatment options. It does NOT include all information about conditions, treatments, medications, side effects, or risks that may apply to a specific patient. It is not intended to be medical advice or a substitute for the medical advice, diagnosis, or treatment of a health care provider based on the health care provider's examination and assessment of a patient’s specific and unique circumstances. Patients must speak with a health care provider for complete information about their health, medical questions, and treatment options, including any risks or benefits regarding use of medications. This information does not endorse any treatments or medications as safe, effective, or approved for treating a specific patient. [employer] and its affiliates disclaim any warranty or liability relating to this information or the use thereof. The use of this information is governed by the Terms of Use, available at https://www.[employer].com/en/know/clinical-effectiveness-terms   Copyright   Copyright © 2024 [employer] and its affiliates and/or licensors. All rights reserved.

## 2024-07-01 NOTE — PROGRESS NOTES
Assessment / Plan    1. Encounter for gynecological examination without abnormal finding  Normal well woman exam  Pap with hpv updated  Up to date on colonoscopy    2. Encounter for Papanicolaou smear for cervical cancer screening    - Liquid-based pap, screening    3. Screening for HPV (human papillomavirus)    - Liquid-based pap, screening    4. Encounter for screening mammogram for breast cancer  Order placed for next year    - Mammo screening bilateral w 3d & cad; Future        Subjective      Nicole A Harada is a 48 y.o. female who presents for her annual gynecologic exam.    47yo     Last seen 2022 w/ c/o AUB; normal pelvic US, benign endometrial bx.  She is still getting periods, although irregular. LMP 2024.    2021 pap HPV negative  Hx of abnormal paps.  STD hx negative  2024 mammo birads 0 on right; rt dx US benign.  Return to routine screening.  Mat aunt breast ca  2022 colonoscopy, NL; 10 yr update    Pertinent hx: migraine with aura, HTN, BMI     Sexually active: yes,   Condom use: yes  Nutrition: working with weight mgt  Calcium intake: given guidelines  Exercise: daily  Safety: feels safe    Periods are irregular  Current contraception: condoms  History of abnormal Pap smear: yes -   Family history of breast,uterine, ovarian or colon cancer: yes - mat aunt breast ca      Menstrual History:  OB History          2    Para   2    Term   2       0    AB   0    Living   2         SAB        IAB        Ectopic        Multiple        Live Births   2           Obstetric Comments   2 vaginal  Menarche 12  Cycles Q 26-28 days, x 4-5d, heavy x 1 day then lightens. At heaviest changes pad 2-3 times a day.                Patient's last menstrual period was 04/15/2024.       The following portions of the patient's history were reviewed and updated as appropriate: allergies, current medications, past family history, past medical history, past social history, past surgical  history, and problem list.    Review of Systems      Review of Systems   Constitutional:  Negative for chills and fever.   Respiratory:  Negative for cough and shortness of breath.    Gastrointestinal:  Negative for abdominal distention, abdominal pain, blood in stool, constipation, diarrhea, nausea and vomiting.   Genitourinary:  Negative for difficulty urinating, dysuria, frequency, genital sores, hematuria, menstrual problem, pelvic pain, urgency, vaginal bleeding and vaginal discharge.   Musculoskeletal:  Negative for arthralgias and myalgias.     Breasts:  Negative for skin changes, dimpling, asymmetry, nipple discharge, redness, tenderness or palpable masses    Objective     *patient agrees to exam without a chaperone present.     /68   Ht 5' (1.524 m)   Wt 84.8 kg (187 lb)   LMP 04/15/2024   BMI 36.52 kg/m²   Physical Exam  Constitutional:       General: She is not in acute distress.     Appearance: Normal appearance. She is well-developed. She is not ill-appearing or diaphoretic.      Comments: Bmi 36.5   HENT:      Head: Normocephalic and atraumatic.   Eyes:      Pupils: Pupils are equal, round, and reactive to light.   Neck:      Thyroid: No thyromegaly.   Pulmonary:      Effort: Pulmonary effort is normal.   Chest:   Breasts:     Breasts are symmetrical.      Right: No inverted nipple, mass, nipple discharge, skin change or tenderness.      Left: No inverted nipple, mass, nipple discharge, skin change or tenderness.   Abdominal:      General: There is no distension.      Palpations: Abdomen is soft. There is no mass.      Tenderness: There is no abdominal tenderness. There is no guarding or rebound.   Genitourinary:     General: Normal vulva.      Exam position: Lithotomy position.      Labia:         Right: No rash, tenderness, lesion or injury.         Left: No rash, tenderness, lesion or injury.       Vagina: No signs of injury and foreign body. No vaginal discharge, erythema, tenderness or  bleeding.      Cervix: No cervical motion tenderness, discharge or friability.      Uterus: Not enlarged and not tender.       Adnexa:         Right: No mass or tenderness.          Left: No mass or tenderness.     Musculoskeletal:      Cervical back: Neck supple.   Lymphadenopathy:      Cervical: No cervical adenopathy.      Upper Body:      Right upper body: No supraclavicular adenopathy.      Left upper body: No supraclavicular adenopathy.   Skin:     General: Skin is warm and dry.   Neurological:      General: No focal deficit present.      Mental Status: She is alert and oriented to person, place, and time.   Psychiatric:         Mood and Affect: Mood normal.         Behavior: Behavior normal.         Thought Content: Thought content normal.         Judgment: Judgment normal.

## 2024-07-05 DIAGNOSIS — E78.5 DYSLIPIDEMIA: ICD-10-CM

## 2024-07-05 RX ORDER — EZETIMIBE 10 MG/1
10 TABLET ORAL DAILY
Qty: 30 TABLET | Refills: 0 | Status: SHIPPED | OUTPATIENT
Start: 2024-07-05

## 2024-07-09 LAB
LAB AP GYN PRIMARY INTERPRETATION: NORMAL
Lab: NORMAL

## 2024-07-30 DIAGNOSIS — E78.5 DYSLIPIDEMIA: ICD-10-CM

## 2024-07-31 RX ORDER — EZETIMIBE 10 MG/1
10 TABLET ORAL DAILY
Qty: 30 TABLET | Refills: 5 | Status: SHIPPED | OUTPATIENT
Start: 2024-07-31

## 2024-08-05 ENCOUNTER — HOSPITAL ENCOUNTER (OUTPATIENT)
Dept: ULTRASOUND IMAGING | Facility: MEDICAL CENTER | Age: 49
Discharge: HOME | End: 2024-08-05
Payer: MEDICARE

## 2024-08-05 DIAGNOSIS — N28.89 NODULE OF KIDNEY: ICD-10-CM

## 2024-08-05 PROCEDURE — 76775 US EXAM ABDO BACK WALL LIM: CPT

## 2024-08-06 DIAGNOSIS — N28.89 NODULE OF KIDNEY: Primary | ICD-10-CM

## 2024-08-10 DIAGNOSIS — E66.09 CLASS 2 OBESITY DUE TO EXCESS CALORIES WITH BODY MASS INDEX (BMI) OF 36.0 TO 36.9 IN ADULT: ICD-10-CM

## 2024-08-12 RX ORDER — NALTREXONE HYDROCHLORIDE 50 MG/1
TABLET, FILM COATED ORAL
Qty: 30 TABLET | Refills: 0 | Status: SHIPPED | OUTPATIENT
Start: 2024-08-12 | End: 2024-08-19 | Stop reason: SDUPTHER

## 2024-08-19 ENCOUNTER — OFFICE VISIT (OUTPATIENT)
Dept: BARIATRICS | Facility: CLINIC | Age: 49
End: 2024-08-19
Payer: MEDICARE

## 2024-08-19 VITALS
TEMPERATURE: 96.4 F | RESPIRATION RATE: 17 BRPM | HEART RATE: 80 BPM | BODY MASS INDEX: 37.3 KG/M2 | DIASTOLIC BLOOD PRESSURE: 75 MMHG | WEIGHT: 190 LBS | SYSTOLIC BLOOD PRESSURE: 110 MMHG | HEIGHT: 60 IN

## 2024-08-19 DIAGNOSIS — E66.9 OBESITY (BMI 30-39.9): ICD-10-CM

## 2024-08-19 DIAGNOSIS — F41.9 ANXIETY: ICD-10-CM

## 2024-08-19 DIAGNOSIS — E66.09 CLASS 2 OBESITY DUE TO EXCESS CALORIES WITH BODY MASS INDEX (BMI) OF 36.0 TO 36.9 IN ADULT: ICD-10-CM

## 2024-08-19 PROCEDURE — 99213 OFFICE O/P EST LOW 20 MIN: CPT | Performed by: PHYSICIAN ASSISTANT

## 2024-08-19 RX ORDER — BUPROPION HYDROCHLORIDE 300 MG/1
300 TABLET ORAL EVERY MORNING
Qty: 30 TABLET | Refills: 5 | Status: SHIPPED | OUTPATIENT
Start: 2024-08-19

## 2024-08-19 RX ORDER — NALTREXONE HYDROCHLORIDE 50 MG/1
TABLET, FILM COATED ORAL
Qty: 30 TABLET | Refills: 5 | Status: SHIPPED | OUTPATIENT
Start: 2024-08-19

## 2024-08-19 NOTE — PROGRESS NOTES
Assessment/Plan:     Initial: 220 lbs BMI 41.7  Last visit: 183.6 lbs BMI 35.86  Current: 190 lbs BMI 37.11 (8/19/24)  Goal: 180 lbs     - Weight not at goal  - Patient is interested in Conservative Program  - Labs reviewed: As below.    General Recommendations:  Nutrition:  Eat breakfast daily.  Do not skip meals.     Food log (ie.) www.myfitnesspal.com, sparkpeople.com, loseit.com, calorieking.com, etc.    Practice mindful eating.  Be sure to set aside time to eat, eat slowly, and savor your food.    Hydration:    At least 64oz of water daily.  No sugar sweetened beverages.  No juice (eat the fruit instead).    Exercise:  Studies have shown that the ideal exercise goal is somewhere between 150 to 300 minutes of moderate intensity exercise a week.  Start with exercising 10 minutes every other day and gradually increase physical activity with a goal of at least 150 minutes of moderate intensity exercise a week, divided over at least 3 days a week.  An example of this would be exercising 30 minutes a day, 5 days a week.  Resistance training can increase muscle mass and increase our resting metabolic rate.   FULL BODY resistance training is recommended 2-3 times a week.  Do not do this on consecutive days to allow for muscle recovery.    Aim for a bare minimum 5000 steps, even on days you do not exercise.    Monitoring:   Weigh yourself daily.  If this causes undue stress, then just weigh yourself once a week.  Weigh yourself the same time of the day with the same amount of clothing on.  Preferably this should be done after waking up, before you eat, and with no clothing or minimal clothing on.    Calorie goal:  1500 valarie/day    Return visit:    Calorie tracking/deficit  Physical activity goals reviewed  AOM  Continue contrave  If no continued weight loss and/or weight gain, could consider GLP1 RA  RTC in 6 months      Subjective:   Chief Complaint   Patient presents with    Follow-up     MWM-4M F/u; Waist-42in        Patient ID: Nicole A Harada  is a 48 y.o. female with excess weight/obesity here to pursue weight management.  Patient is pursuing Conservative Program with bundles with the dietician. Presents to the office visit with  Kiel.  Most recent notes and records were reviewed.    Exercise - treadmill walking 40-60 min. Yoga + weight training/pilates. Family walks in the evening. Intermittent hiking. Kayaking. Has a smart watch - average 15,000 steps.     Son is currently managed on wegovy - under same insurance.     HPI    Wt Readings from Last 10 Encounters:   08/19/24 86.2 kg (190 lb)   07/01/24 84.8 kg (187 lb)   06/18/24 84.8 kg (187 lb)   06/17/24 84.8 kg (187 lb)   05/03/24 82 kg (180 lb 10.7 oz)   04/12/24 83.3 kg (183 lb 9.6 oz)   01/05/24 85.6 kg (188 lb 11.5 oz)   12/01/23 84.3 kg (185 lb 12.8 oz)   09/22/23 80.6 kg (177 lb 9.6 oz)   07/20/23 77 kg (169 lb 12.8 oz)         Taking Wellbutrin  mg daily. No negative side effects. Helping with mood and appetite.     Taking Naltrexone 50 mg, 1/2 tab twice a day.      Has help at work now and stress level has greatly improved.      Food logging: yes, getting around 6340-7695 calories per day. Watching protein. Tried to reduce calories lower to around 1000 per day, but felt too hungry.      Protein: 140g protein  Calories: 1600 valarie/day     B: protein shake  S: protein bar  L: turkey in carb balance wrap  D: protein + veggie + starch       Hydration- 125 oz water daily, occ green tea - black, occ fairlife chocolate milk   Alcohol- rare wine  Exercise- 2 walks per day each for 50 minutes, daily yoga and weight training in the morning     Tobacco- denies  Sleep- 7.5 hours       Colonoscopy: UTD, due 2029  Mammogram: UTD, due May 2024 - order placed        Family History   Problem Relation Age of Onset    Rheum arthritis Mother     Hypertension Mother     Hyperlipidemia Mother     Arthritis Mother     Hepatitis Father         B    Hyperlipidemia  Father     Hypertension Father     Anxiety disorder Sister     Depression Sister     Lung cancer Maternal Grandmother         over 80    Rheum arthritis Maternal Grandmother     Cancer Maternal Grandmother         advanced, unknown primary    Kidney disease Maternal Grandfather     Anxiety disorder Paternal Grandmother     Depression Paternal Grandmother     Bipolar disorder Paternal Grandmother     Lung cancer Paternal Grandmother         over 80    Cancer Paternal Grandmother     Diabetes Paternal Grandfather         Only in his 80s    Kidney disease Paternal Grandfather     Breast cancer additional onset Maternal Aunt     Breast cancer Maternal Aunt 34    Heart disease Maternal Uncle     Heart disease Maternal Uncle     Alcohol abuse Maternal Uncle     Alcohol abuse Cousin     Anxiety disorder Cousin     Drug abuse Cousin     Alcohol abuse Cousin     Nephrolithiasis Family     Colon cancer Neg Hx     Ovarian cancer Neg Hx     Uterine cancer Neg Hx     Stroke Neg Hx     Thyroid disease Neg Hx         Review of Systems   HENT:  Negative for sore throat.    Respiratory:  Negative for cough and shortness of breath.    Cardiovascular:  Negative for chest pain and palpitations.   Gastrointestinal:  Positive for abdominal pain (menstrual). Negative for constipation, diarrhea, nausea and vomiting.        Denies GERD   Musculoskeletal:  Positive for back pain (menstrual). Negative for arthralgias.   Skin:  Negative for rash.   Psychiatric/Behavioral:  Negative for suicidal ideas (or HI).         Denies depression and anxiety       Objective:  /75   Pulse 80   Temp (!) 96.4 °F (35.8 °C)   Resp 17   Ht 5' (1.524 m)   Wt 86.2 kg (190 lb)   BMI 37.11 kg/m²     Physical Exam  Vitals and nursing note reviewed.        Constitutional   General appearance: Abnormal.  well developed and obese.   Eyes No conjunctival injection.   Ears, Nose, Mouth, and Throat Oral mucosa moist.   Pulmonary   Respiratory effort: No  increased work of breathing or signs of respiratory distress.     Cardiovascular     Examination of extremities for edema and/or varicosities: Normal.  no edema.   Abdomen   Abdomen: Abnormal.  The abdomen was obese.    Musculoskeletal   Normal range of motion  Neurological   Gait and station: Normal.    Psychiatric   Orientation to person, place and time: Normal.    Affect: appropriate

## 2025-01-03 DIAGNOSIS — E78.5 DYSLIPIDEMIA: ICD-10-CM

## 2025-01-06 DIAGNOSIS — E55.9 VITAMIN D DEFICIENCY: ICD-10-CM

## 2025-01-06 DIAGNOSIS — E78.5 DYSLIPIDEMIA: Primary | ICD-10-CM

## 2025-01-06 RX ORDER — EZETIMIBE 10 MG/1
10 TABLET ORAL DAILY
Qty: 30 TABLET | Refills: 0 | Status: SHIPPED | OUTPATIENT
Start: 2025-01-06

## 2025-01-06 NOTE — TELEPHONE ENCOUNTER
Please call pt and let her know she is overdue for fasting labs so I just put order in. She needs to get this done before any further refills of her zetia will be given. Thank you.

## 2025-02-04 DIAGNOSIS — E78.5 DYSLIPIDEMIA: ICD-10-CM

## 2025-02-06 DIAGNOSIS — E66.9 OBESITY (BMI 30-39.9): ICD-10-CM

## 2025-02-06 DIAGNOSIS — F41.9 ANXIETY: ICD-10-CM

## 2025-02-06 RX ORDER — BUPROPION HYDROCHLORIDE 300 MG/1
300 TABLET ORAL EVERY MORNING
Qty: 30 TABLET | Refills: 1 | Status: SHIPPED | OUTPATIENT
Start: 2025-02-06

## 2025-02-06 RX ORDER — EZETIMIBE 10 MG/1
10 TABLET ORAL DAILY
Qty: 30 TABLET | Refills: 0 | OUTPATIENT
Start: 2025-02-06

## 2025-02-06 NOTE — TELEPHONE ENCOUNTER
Pt was already told last month no further refill until labs. Please call pt and reiterate. See last refill note. Lab orders are active in pts chart.    Speaking Coherently

## 2025-03-30 ENCOUNTER — APPOINTMENT (OUTPATIENT)
Dept: LAB | Facility: HOSPITAL | Age: 50
End: 2025-03-30
Payer: MEDICARE

## 2025-03-30 DIAGNOSIS — Z13.1 SCREENING FOR DIABETES MELLITUS: ICD-10-CM

## 2025-03-30 DIAGNOSIS — E66.812 OBESITY, CLASS II, BMI 35-39.9: ICD-10-CM

## 2025-03-30 LAB
25(OH)D3 SERPL-MCNC: 32.8 NG/ML (ref 30–100)
ALBUMIN SERPL BCG-MCNC: 4.6 G/DL (ref 3.5–5)
ALP SERPL-CCNC: 59 U/L (ref 34–104)
ALT SERPL W P-5'-P-CCNC: 23 U/L (ref 7–52)
ANION GAP SERPL CALCULATED.3IONS-SCNC: 5 MMOL/L (ref 4–13)
AST SERPL W P-5'-P-CCNC: 16 U/L (ref 13–39)
BILIRUB SERPL-MCNC: 0.87 MG/DL (ref 0.2–1)
BUN SERPL-MCNC: 9 MG/DL (ref 5–25)
CALCIUM SERPL-MCNC: 9.6 MG/DL (ref 8.4–10.2)
CHLORIDE SERPL-SCNC: 104 MMOL/L (ref 96–108)
CHOLEST SERPL-MCNC: 198 MG/DL (ref ?–200)
CO2 SERPL-SCNC: 30 MMOL/L (ref 21–32)
CREAT SERPL-MCNC: 0.98 MG/DL (ref 0.6–1.3)
EST. AVERAGE GLUCOSE BLD GHB EST-MCNC: 105 MG/DL
GFR SERPL CREATININE-BSD FRML MDRD: 68 ML/MIN/1.73SQ M
GLUCOSE P FAST SERPL-MCNC: 94 MG/DL (ref 65–99)
HBA1C MFR BLD: 5.3 %
HDLC SERPL-MCNC: 43 MG/DL
INSULIN SERPL-ACNC: 7.31 UIU/ML (ref 1.9–23)
LDLC SERPL CALC-MCNC: 120 MG/DL (ref 0–100)
POTASSIUM SERPL-SCNC: 4.3 MMOL/L (ref 3.5–5.3)
PROT SERPL-MCNC: 7.2 G/DL (ref 6.4–8.4)
SODIUM SERPL-SCNC: 139 MMOL/L (ref 135–147)
TRIGL SERPL-MCNC: 174 MG/DL (ref ?–150)
TSH SERPL DL<=0.05 MIU/L-ACNC: 2.27 UIU/ML (ref 0.45–4.5)

## 2025-03-30 PROCEDURE — 84443 ASSAY THYROID STIM HORMONE: CPT

## 2025-03-30 PROCEDURE — 83036 HEMOGLOBIN GLYCOSYLATED A1C: CPT

## 2025-03-30 PROCEDURE — 83525 ASSAY OF INSULIN: CPT

## 2025-03-30 PROCEDURE — 36415 COLL VENOUS BLD VENIPUNCTURE: CPT

## 2025-03-31 ENCOUNTER — RESULTS FOLLOW-UP (OUTPATIENT)
Dept: FAMILY MEDICINE CLINIC | Facility: CLINIC | Age: 50
End: 2025-03-31

## 2025-03-31 NOTE — RESULT ENCOUNTER NOTE
Please let the patient know that she finally went for her blood work that was ordered in June as she had not had blood work since 2023 and we have the results.  Please make an appointment for her to review them with me.  Thank you.

## 2025-04-08 ENCOUNTER — OFFICE VISIT (OUTPATIENT)
Dept: BARIATRICS | Facility: CLINIC | Age: 50
End: 2025-04-08
Payer: MEDICARE

## 2025-04-08 VITALS
WEIGHT: 205.8 LBS | TEMPERATURE: 98.1 F | BODY MASS INDEX: 37.87 KG/M2 | HEART RATE: 96 BPM | DIASTOLIC BLOOD PRESSURE: 78 MMHG | HEIGHT: 62 IN | SYSTOLIC BLOOD PRESSURE: 118 MMHG | RESPIRATION RATE: 16 BRPM

## 2025-04-08 DIAGNOSIS — E78.5 DYSLIPIDEMIA: ICD-10-CM

## 2025-04-08 DIAGNOSIS — K21.9 GASTROESOPHAGEAL REFLUX DISEASE WITHOUT ESOPHAGITIS: ICD-10-CM

## 2025-04-08 DIAGNOSIS — F41.9 ANXIETY: ICD-10-CM

## 2025-04-08 DIAGNOSIS — E66.01 CLASS 2 SEVERE OBESITY DUE TO EXCESS CALORIES WITH SERIOUS COMORBIDITY AND BODY MASS INDEX (BMI) OF 38.0 TO 38.9 IN ADULT (HCC): Primary | ICD-10-CM

## 2025-04-08 DIAGNOSIS — E66.812 CLASS 2 SEVERE OBESITY DUE TO EXCESS CALORIES WITH SERIOUS COMORBIDITY AND BODY MASS INDEX (BMI) OF 38.0 TO 38.9 IN ADULT (HCC): Primary | ICD-10-CM

## 2025-04-08 PROCEDURE — 99214 OFFICE O/P EST MOD 30 MIN: CPT | Performed by: PHYSICIAN ASSISTANT

## 2025-04-08 RX ORDER — TIRZEPATIDE 2.5 MG/.5ML
2.5 INJECTION, SOLUTION SUBCUTANEOUS WEEKLY
Qty: 2 ML | Refills: 0 | Status: SHIPPED | OUTPATIENT
Start: 2025-04-08 | End: 2025-05-06

## 2025-04-08 RX ORDER — BUPROPION HYDROCHLORIDE 300 MG/1
300 TABLET ORAL EVERY MORNING
Qty: 90 TABLET | Refills: 1 | Status: SHIPPED | OUTPATIENT
Start: 2025-04-08

## 2025-04-08 NOTE — PROGRESS NOTES
Assessment/Plan:    Obesity, Class II, BMI 35-39.9  - Patient is pursuing Conservative Program with bundles with the dietician.  - She is not interested in weight loss surgery.  - Some form of birth control recommended while on weight loss medications.   - Initial weight loss goal of 5-10% weight loss for improved health-met    - She denies history of seizures or glaucoma.   - Wellbutrin started 2/3/2023 at weight of 181.4 lbs to help with appetite and cravings.   - Continue Wellbutrin  mg daily. Tolerating well and helping with appetite and mood.  - Naltrexone added Dec 2023 at weight of 185.3 lbs. Tolerating well, but no difference with appetite/cravings, will discontinue.   - Discussed alternative medications: GLP-1 therapy, phentermine, and Alek. Avoid Topamax as not currently using contraception. We will hold off on adding any other medication for weight loss at this time, but can reconsider in the future if needed.    - Check A1C, fasting insulin, and TSH.        Initial: 220 lbs BMI 41.7  Last visit: 185.8 lbs BMI 36.90  Current: 183.6 lbs BMI 35.86  Change: -36 lbs (-2 lbs since the last office visit)  Goal: 180 lbs      Goals:  Continue food logging, weighing and measuring.   Keep up the great work with exercise  Keep up the great work with water intake, at least 64 oz daily.  Continue nutrition recommendations per the dietician.  8879-0263 calories, flex to 1600 calories on cardio days.  Continue Wellbutrin.   Stop naltrexone.          No follow-ups on file.medical provider followup       {Assess/PlanSmartLinks:32052}      Subjective:   Chief Complaint   Patient presents with   • Follow-up     Mwm f/u 6M:: waist: 47in.        Patient ID: Nicole A Harada  is a 49 y.o. female with excess weight/obesity here to pursue weight managment.  Patient is pursuing {PT INTEREST (Optional):11798}.     HPI  Doing a protein shake at night to help with appetite but feels   Wt Readings from Last 10 Encounters:    04/08/25 93.4 kg (205 lb 12.8 oz)   08/19/24 86.2 kg (190 lb)   07/01/24 84.8 kg (187 lb)   06/18/24 84.8 kg (187 lb)   06/17/24 84.8 kg (187 lb)   05/03/24 82 kg (180 lb 10.7 oz)   04/12/24 83.3 kg (183 lb 9.6 oz)   01/05/24 85.6 kg (188 lb 11.5 oz)   12/01/23 84.3 kg (185 lb 12.8 oz)   09/22/23 80.6 kg (177 lb 9.6 oz)       Food logging:  Increased appetite/cravings:  Exercise:walks daily about 45 mintues and trying to do yoga/strength. Walking about 10.000 steps a day  Hydration:water more than 80 oz , hot green tea    310 protein shake w/almond milk and fruit  Protein bar   Greek yogurt   Chicken, vegetables , starch  Protein shake and 2-3 rice cakes    The following portions of the patient's history were reviewed and updated as appropriate: She  has a past medical history of Abnormal Pap smear of cervix, Anxiety, Breast nodule (2009), Fibrocystic breast, GERD (gastroesophageal reflux disease), Headache(784.0), Heart murmur, Hyperlipidemia, Migraine with aura and without status migrainosus, not intractable, Morbid obesity with BMI of 40.0-44.9, adult ([facility]), Peptic ulcer, and Polycystic ovary syndrome (3/19).  She   Patient Active Problem List    Diagnosis Date Noted   • Obesity, Class II, BMI 35-39.9 04/12/2024   • Pars defect with spondylolisthesis 05/11/2023   • Pancreatic cyst 05/05/2023   • Osteoarthritis of multiple joints 05/05/2023   • Pelvic pain 03/31/2023   • Abdominal cramps 03/31/2023   • RLQ abdominal pain 03/31/2023   • Bleeding hemorrhoid 12/02/2019   • Gastroesophageal reflux disease without esophagitis 04/19/2019   • Vitamin D deficiency 04/09/2019   • Periodic limb movements of sleep    • Abnormal weight gain 02/20/2019   • Irregular menstrual cycle 02/20/2019   • Sleep disturbances 02/20/2019   • Hirsutism 02/20/2019   • Myalgia 02/20/2019   • Dyslipidemia 01/18/2019   • Class 2 obesity due to excess calories with body mass index (BMI) of 36.0 to 36.9 in adult 01/18/2019   • Anxiety  01/08/2019   • Dietary calcium deficiency 11/18/2016   • Palpitations 05/13/2015   • Vertigo 05/13/2015     She  has a past surgical history that includes [place] tooth extraction; orthodontic treatment; Colonoscopy; and pr sphincterotomy anal division sphincter spx (N/A, 3/20/2020).  Her family history includes Alcohol abuse in her cousin, cousin, and maternal uncle; Anxiety disorder in her cousin, paternal grandmother, and sister; Arthritis in her mother; Bipolar disorder in her paternal grandmother; Breast cancer (age of onset: 34) in her maternal aunt; Breast cancer additional onset in her maternal aunt; Cancer in her maternal grandmother and paternal grandmother; Depression in her paternal grandmother and sister; Diabetes in her paternal grandfather; Drug abuse in her cousin; Heart disease in her maternal uncle and maternal uncle; Hepatitis in her father; Hyperlipidemia in her father and mother; Hypertension in her father and mother; Kidney disease in her maternal grandfather and paternal grandfather; Lung cancer in her maternal grandmother and paternal grandmother; Nephrolithiasis in her family; Rheum arthritis in her maternal grandmother and mother.  She  reports that she has never smoked. She has never used smokeless tobacco. She reports that she does not currently use alcohol. She reports that she does not use drugs.  Current Outpatient Medications   Medication Sig Dispense Refill   • b complex vitamins capsule Take 1 capsule by mouth daily     • buPROPion (WELLBUTRIN XL) 300 mg 24 hr tablet Take 1 tablet (300 mg total) by mouth every morning 30 tablet 1   • Cholecalciferol (VITAMIN D3) 2000 units TABS Take 2,000 Units by mouth daily     • ezetimibe (ZETIA) 10 mg tablet TAKE ONE TABLET BY MOUTH EVERY DAY 30 tablet 0   • multivitamin (THERAGRAN) TABS Take 1 tablet by mouth daily     • Zinc 50 MG TABS Take by mouth     • naltrexone (REVIA) 50 mg tablet Take 1/2 tablet by mouth 2 times a day 30 tablet 5   •  "triamcinolone (KENALOG) 0.1 % cream Apply topically 2 (two) times a day (Patient not taking: Reported on 7/1/2024) 30 g 0     No current facility-administered medications for this visit.     Current Outpatient Medications on File Prior to Visit   Medication Sig   • b complex vitamins capsule Take 1 capsule by mouth daily   • buPROPion (WELLBUTRIN XL) 300 mg 24 hr tablet Take 1 tablet (300 mg total) by mouth every morning   • Cholecalciferol (VITAMIN D3) 2000 units TABS Take 2,000 Units by mouth daily   • ezetimibe (ZETIA) 10 mg tablet TAKE ONE TABLET BY MOUTH EVERY DAY   • multivitamin (THERAGRAN) TABS Take 1 tablet by mouth daily   • Zinc 50 MG TABS Take by mouth   • naltrexone (REVIA) 50 mg tablet Take 1/2 tablet by mouth 2 times a day   • triamcinolone (KENALOG) 0.1 % cream Apply topically 2 (two) times a day (Patient not taking: Reported on 7/1/2024)     No current facility-administered medications on file prior to visit.     She is allergic to lipitor [atorvastatin], morphine, and bactrim [sulfamethoxazole-trimethoprim]..    Review of Systems    Objective:    /78   Pulse 96   Temp 98.1 °F (36.7 °C)   Resp 16   Ht 5' 1.5\" (1.562 m)   Wt 93.4 kg (205 lb 12.8 oz)   BMI 38.26 kg/m²      Physical Exam    "

## 2025-04-08 NOTE — PROGRESS NOTES
Assessment/Plan:    Obesity, Class II, BMI 35-39.9  - Patient is pursuing Conservative Program with bundles with the dietician.  - She is not interested in weight loss surgery.  - Some form of birth control recommended while on weight loss medications.   - Initial weight loss goal of 5-10% weight loss for improved health-met    - She denies history of seizures or glaucoma.   - Wellbutrin started 2/3/2023 at weight of 181.4 lbs to help with appetite and cravings.   - Continue Wellbutrin  mg daily. Tolerating well and helping with appetite and mood.  - Naltrexone added Dec 2023 at weight of 185.3 lbs. Tolerating well, but no difference with appetite/cravings, will discontinue.   - Discussed alternative medications: GLP-1 therapy, phentermine, and Alek. Avoid Topamax as not currently using contraception. We will hold off on adding any other medication for weight loss at this time, but can reconsider in the future if needed.    - Check A1C, fasting insulin, and TSH.        Initial: 220 lbs BMI 41.7  Last visit: 185.8 lbs BMI 36.90  Current: 183.6 lbs BMI 35.86  Change: -36 lbs (-2 lbs since the last office visit)  Goal: 180 lbs      Goals:  Continue food logging, weighing and measuring.   Keep up the great work with exercise  Keep up the great work with water intake, at least 64 oz daily.  Continue nutrition recommendations per the dietician.  [id number] calories, flex to 1600 calories on cardio days.  Continue Wellbutrin.   Stop naltrexone.        Gastroesophageal reflux disease without esophagitis  Discussed possible side effect with zepbound    Class 2 obesity due to excess calories with body mass index (BMI) of 38.0 to 38.9 in adult  - Patient is pursuing Conservative Program   - She is not interested in weight loss surgery.  - Initial weight loss goal of 5-10% weight loss for improved health      To start on zepbound.  To start on initial dose of medication and then to titrate as tolerated.  They have tried  more than 6 months of lifestyle modifications including diet and activity changes and has had insignificant weight loss of less than 1 lb a week. Patient denies personal and family history of MCT and MEN2 tumors. Patient denies personal history of pancreatitis. Side effects discussed but not limited to diarrhea, bloating, constipation, GI upset, heartburn, increased heart rate, headache, low blood sugar, fatigue and dizziness. Titration and medication administration discussed.  - To continue on wellbutrin as it has been helping with mood.  Wellbutrin started 2/3/2023 at weight of 181.4 lbs to help with appetite and cravings. She has not had naltrexone but will stop      Initial: 220 lbs BMI 41.7  Current: 205  Change: -15  Goal: 180 lbs     Goals:  Continue food logging- recommend closer to 1500 calories as a goal  Continue water intake and protein   Discussed adding on strength training and she plans to . She was doing [employer]    Dyslipidemia  - Taking Zetia. May improve with weight loss and lifestyle modification. Continue management with prescribing provider.         Return in about 3 months (around 7/8/2025).medical provider followup       Diagnoses and all orders for this visit:    Class 2 severe obesity due to excess calories with serious comorbidity and body mass index (BMI) of 38.0 to 38.9 in adult ([facility])    BMI 38.0-38.9,adult  -     tirzepatide (Zepbound) 2.5 mg/0.5 mL auto-injector; Inject 0.5 mL (2.5 mg total) under the skin once a week for 28 days    Dyslipidemia  -     tirzepatide (Zepbound) 2.5 mg/0.5 mL auto-injector; Inject 0.5 mL (2.5 mg total) under the skin once a week for 28 days    Anxiety  -     buPROPion (WELLBUTRIN XL) 300 mg 24 hr tablet; Take 1 tablet (300 mg total) by mouth every morning    Gastroesophageal reflux disease without esophagitis          Subjective:   Chief Complaint   Patient presents with    Follow-up     Mwm f/u 6M:: waist: 47in.        Patient ID: Nicole A Harada  candis  a 49 y.o. female with excess weight/obesity here to pursue weight managment.  Patient is pursuing Conservative Program.     HPI  On wellbutrin and naltrexone. She thinks she may not have had the naltrexone though and that was helping with appetite. She is interested in switching medication as she is still hungry and at night and has gained weight. Her son is on wegovy  Wt Readings from Last 10 Encounters:   04/08/25 93.4 kg (205 lb 12.8 oz)   08/19/24 86.2 kg (190 lb)   07/01/24 84.8 kg (187 lb)   06/18/24 84.8 kg (187 lb)   06/17/24 84.8 kg (187 lb)   05/03/24 82 kg (180 lb 10.7 oz)   04/12/24 83.3 kg (183 lb 9.6 oz)   01/05/24 85.6 kg (188 lb 11.5 oz)   12/01/23 84.3 kg (185 lb 12.8 oz)   09/22/23 80.6 kg (177 lb 9.6 oz)       Food logging:  Increased appetite/cravings:  Exercise:walks daily about 45 mintues and trying to do yoga/strength. Walking about 10.000 steps a day  Hydration:water more than 80 oz , hot green tea     Diet Recall:  310 protein shake w/almond milk and fruit  Protein bar   Greek yogurt   Chicken, vegetables , starch  Protein shake and 2-3 rice cakes      The following portions of the patient's history were reviewed and updated as appropriate: She  has a past medical history of Abnormal Pap smear of cervix, Anxiety, Breast nodule (2009), Fibrocystic breast, GERD (gastroesophageal reflux disease), Headache(784.0), Heart murmur, Hyperlipidemia, Migraine with aura and without status migrainosus, not intractable, Morbid obesity with BMI of 40.0-44.9, adult (HCC), Peptic ulcer, and Polycystic ovary syndrome (3/19).  She   Patient Active Problem List    Diagnosis Date Noted    Obesity, Class II, BMI 35-39.9 04/12/2024    Pars defect with spondylolisthesis 05/11/2023    Pancreatic cyst 05/05/2023    Osteoarthritis of multiple joints 05/05/2023    Pelvic pain 03/31/2023    Abdominal cramps 03/31/2023    RLQ abdominal pain 03/31/2023    Bleeding hemorrhoid 12/02/2019    Gastroesophageal reflux disease  without esophagitis 04/19/2019    Vitamin D deficiency 04/09/2019    Periodic limb movements of sleep     Abnormal weight gain 02/20/2019    Irregular menstrual cycle 02/20/2019    Sleep disturbances 02/20/2019    Hirsutism 02/20/2019    Myalgia 02/20/2019    Dyslipidemia 01/18/2019    Class 2 obesity due to excess calories with body mass index (BMI) of 38.0 to 38.9 in adult 01/18/2019    Anxiety 01/08/2019    Dietary calcium deficiency 11/18/2016    Palpitations 05/13/2015    Vertigo 05/13/2015     She  has a past surgical history that includes [place] tooth extraction; orthodontic treatment; Colonoscopy; and pr sphincterotomy anal division sphincter spx (N/A, 3/20/2020).  Her family history includes Alcohol abuse in her cousin, cousin, and maternal uncle; Anxiety disorder in her cousin, paternal grandmother, and sister; Arthritis in her mother; Bipolar disorder in her paternal grandmother; Breast cancer (age of onset: 34) in her maternal aunt; Breast cancer additional onset in her maternal aunt; Cancer in her maternal grandmother and paternal grandmother; Depression in her paternal grandmother and sister; Diabetes in her paternal grandfather; Drug abuse in her cousin; Heart disease in her maternal uncle and maternal uncle; Hepatitis in her father; Hyperlipidemia in her father and mother; Hypertension in her father and mother; Kidney disease in her maternal grandfather and paternal grandfather; Lung cancer in her maternal grandmother and paternal grandmother; Nephrolithiasis in her family; Rheum arthritis in her maternal grandmother and mother.  She  reports that she has never smoked. She has never used smokeless tobacco. She reports that she does not currently use alcohol. She reports that she does not use drugs.  Current Outpatient Medications   Medication Sig Dispense Refill    b complex vitamins capsule Take 1 capsule by mouth daily      buPROPion (WELLBUTRIN XL) 300 mg 24 hr tablet Take 1 tablet (300 mg total)  by mouth every morning 90 tablet 1    Cholecalciferol (VITAMIN D3) 2000 units TABS Take 2,000 Units by mouth daily      ezetimibe (ZETIA) 10 mg tablet TAKE ONE TABLET BY MOUTH EVERY DAY 30 tablet 0    multivitamin (THERAGRAN) TABS Take 1 tablet by mouth daily      tirzepatide (Zepbound) 2.5 mg/0.5 mL auto-injector Inject 0.5 mL (2.5 mg total) under the skin once a week for 28 days 2 mL 0    Zinc 50 MG TABS Take by mouth      triamcinolone (KENALOG) 0.1 % cream Apply topically 2 (two) times a day (Patient not taking: Reported on 7/1/2024) 30 g 0     No current facility-administered medications for this visit.     Current Outpatient Medications on File Prior to Visit   Medication Sig    b complex vitamins capsule Take 1 capsule by mouth daily    Cholecalciferol (VITAMIN D3) 2000 units TABS Take 2,000 Units by mouth daily    ezetimibe (ZETIA) 10 mg tablet TAKE ONE TABLET BY MOUTH EVERY DAY    multivitamin (THERAGRAN) TABS Take 1 tablet by mouth daily    Zinc 50 MG TABS Take by mouth    [DISCONTINUED] buPROPion (WELLBUTRIN XL) 300 mg 24 hr tablet Take 1 tablet (300 mg total) by mouth every morning    triamcinolone (KENALOG) 0.1 % cream Apply topically 2 (two) times a day (Patient not taking: Reported on 7/1/2024)    [DISCONTINUED] naltrexone (REVIA) 50 mg tablet Take 1/2 tablet by mouth 2 times a day     No current facility-administered medications on file prior to visit.     She is allergic to lipitor [atorvastatin], morphine, and bactrim [sulfamethoxazole-trimethoprim]..    Review of Systems   Constitutional:  Negative for fever.   Respiratory:  Negative for shortness of breath.    Cardiovascular:  Negative for chest pain and palpitations.   Gastrointestinal:  Negative for abdominal pain, constipation, diarrhea and vomiting.   Genitourinary:  Negative for difficulty urinating.   Skin:  Negative for rash.   Neurological:  Negative for headaches.   Psychiatric/Behavioral:  Negative for dysphoric mood.   "      Objective:    /78   Pulse 96   Temp 98.1 °F (36.7 °C)   Resp 16   Ht 5' 1.5\" (1.562 m)   Wt 93.4 kg (205 lb 12.8 oz)   BMI 38.26 kg/m²      Physical Exam  Vitals and nursing note reviewed.   Constitutional:       General: She is not in acute distress.     Appearance: She is well-developed. She is obese.   HENT:      Head: Normocephalic and atraumatic.   Eyes:      Conjunctiva/sclera: Conjunctivae normal.   Neck:      Thyroid: No thyromegaly.   Pulmonary:      Effort: Pulmonary effort is normal. No respiratory distress.   Skin:     Findings: No rash (visible).   Neurological:      Mental Status: She is alert and oriented to person, place, and time.   Psychiatric:         Mood and Affect: Mood normal.         Behavior: Behavior normal.        "

## 2025-04-08 NOTE — ASSESSMENT & PLAN NOTE
- Patient is pursuing Conservative Program   - She is not interested in weight loss surgery.  - Initial weight loss goal of 5-10% weight loss for improved health      To start on zepbound.  To start on initial dose of medication and then to titrate as tolerated.  They have tried more than 6 months of lifestyle modifications including diet and activity changes and has had insignificant weight loss of less than 1 lb a week. Patient denies personal and family history of MCT and MEN2 tumors. Patient denies personal history of pancreatitis. Side effects discussed but not limited to diarrhea, bloating, constipation, GI upset, heartburn, increased heart rate, headache, low blood sugar, fatigue and dizziness. Titration and medication administration discussed.  - To continue on wellbutrin as it has been helping with mood.  Wellbutrin started 2/3/2023 at weight of 181.4 lbs to help with appetite and cravings. She has not had naltrexone but will stop      Initial: 220 lbs BMI 41.7  Current: 205  Change: -15  Goal: 180 lbs     Goals:  Continue food logging- recommend closer to 1500 calories as a goal  Continue water intake and protein   Discussed adding on strength training and she plans to . She was doing [email or web address]

## 2025-04-08 NOTE — ASSESSMENT & PLAN NOTE
- Patient is pursuing Conservative Program with bundles with the dietician.  - She is not interested in weight loss surgery.  - Some form of birth control recommended while on weight loss medications.   - Initial weight loss goal of 5-10% weight loss for improved health-met    - She denies history of seizures or glaucoma.   - Wellbutrin started 2/3/2023 at weight of 181.4 lbs to help with appetite and cravings.   - Continue Wellbutrin  mg daily. Tolerating well and helping with appetite and mood.  - Naltrexone added Dec 2023 at weight of 185.3 lbs. Tolerating well, but no difference with appetite/cravings, will discontinue.   - Discussed alternative medications: GLP-1 therapy, phentermine, and Alek. Avoid Topamax as not currently using contraception. We will hold off on adding any other medication for weight loss at this time, but can reconsider in the future if needed.    - Check A1C, fasting insulin, and TSH.        Initial: 220 lbs BMI 41.7  Last visit: 185.8 lbs BMI 36.90  Current: 183.6 lbs BMI 35.86  Change: -36 lbs (-2 lbs since the last office visit)  Goal: 180 lbs      Goals:  Continue food logging, weighing and measuring.   Keep up the great work with exercise  Keep up the great work with water intake, at least 64 oz daily.  Continue nutrition recommendations per the dietician.  [id number] calories, flex to 1600 calories on cardio days.  Continue Wellbutrin.   Stop naltrexone.

## 2025-04-11 ENCOUNTER — TELEPHONE (OUTPATIENT)
Dept: BARIATRICS | Facility: CLINIC | Age: 50
End: 2025-04-11

## 2025-04-11 NOTE — TELEPHONE ENCOUNTER
PA for Zepbound 2.5mg SUBMITTED to [employer]     via    [x]CMM-KEY: [id number]  []Surescripts-Case ID #   []Availity-Auth ID # NDC #   []Faxed to plan   []Other website   []Phone call Case ID #     []PA sent as URGENT    All office notes, labs and other pertaining documents and studies sent. Clinical questions answered. Awaiting determination from insurance company.     Turnaround time for your insurance to make a decision on your Prior Authorization can take 7-21 business days.

## 2025-04-15 NOTE — TELEPHONE ENCOUNTER
PA for Zepbound 2.5mg APPROVED     Date(s) approved 4/12/2025 - 10/12/2025    Case #    Patient advised by          [x]MyChart Message  []Phone call   []LMOM  []L/M to call office as no active Communication consent on file  []Unable to leave detailed message as VM not approved on Communication consent       Pharmacy advised by    [x]Fax  []Phone call  []Secure Chat    Specialty Pharmacy    []     Approval letter scanned into Media Yes      Female

## 2025-05-04 DIAGNOSIS — E78.5 DYSLIPIDEMIA: ICD-10-CM

## 2025-05-05 DIAGNOSIS — E66.812 CLASS 2 OBESITY DUE TO EXCESS CALORIES WITH BODY MASS INDEX (BMI) OF 38.0 TO 38.9 IN ADULT: Primary | ICD-10-CM

## 2025-05-05 DIAGNOSIS — E66.09 CLASS 2 OBESITY DUE TO EXCESS CALORIES WITH BODY MASS INDEX (BMI) OF 38.0 TO 38.9 IN ADULT: Primary | ICD-10-CM

## 2025-05-05 RX ORDER — TIRZEPATIDE 2.5 MG/.5ML
2.5 INJECTION, SOLUTION SUBCUTANEOUS WEEKLY
Qty: 2 ML | Refills: 0 | OUTPATIENT
Start: 2025-05-05 | End: 2025-06-02

## 2025-05-05 RX ORDER — TIRZEPATIDE 5 MG/.5ML
5 INJECTION, SOLUTION SUBCUTANEOUS WEEKLY
Qty: 2 ML | Refills: 1 | Status: SHIPPED | OUTPATIENT
Start: 2025-05-05 | End: 2025-05-12 | Stop reason: SDUPTHER

## 2025-05-12 DIAGNOSIS — E66.09 CLASS 2 OBESITY DUE TO EXCESS CALORIES WITH BODY MASS INDEX (BMI) OF 38.0 TO 38.9 IN ADULT: ICD-10-CM

## 2025-05-12 DIAGNOSIS — E66.812 CLASS 2 OBESITY DUE TO EXCESS CALORIES WITH BODY MASS INDEX (BMI) OF 38.0 TO 38.9 IN ADULT: ICD-10-CM

## 2025-05-13 RX ORDER — TIRZEPATIDE 5 MG/.5ML
5 INJECTION, SOLUTION SUBCUTANEOUS WEEKLY
Qty: 2 ML | Refills: 1 | Status: SHIPPED | OUTPATIENT
Start: 2025-05-13 | End: 2025-07-08

## 2025-06-20 ENCOUNTER — OFFICE VISIT (OUTPATIENT)
Dept: BARIATRICS | Facility: CLINIC | Age: 50
End: 2025-06-20
Payer: MEDICARE

## 2025-06-20 VITALS
BODY MASS INDEX: 35.41 KG/M2 | HEART RATE: 86 BPM | WEIGHT: 192.4 LBS | HEIGHT: 62 IN | RESPIRATION RATE: 16 BRPM | TEMPERATURE: 98.8 F | DIASTOLIC BLOOD PRESSURE: 76 MMHG | SYSTOLIC BLOOD PRESSURE: 118 MMHG

## 2025-06-20 DIAGNOSIS — E66.09 CLASS 2 OBESITY DUE TO EXCESS CALORIES WITH BODY MASS INDEX (BMI) OF 38.0 TO 38.9 IN ADULT: ICD-10-CM

## 2025-06-20 DIAGNOSIS — E66.812 OBESITY, CLASS II, BMI 35-39.9: Primary | ICD-10-CM

## 2025-06-20 DIAGNOSIS — E78.5 DYSLIPIDEMIA: ICD-10-CM

## 2025-06-20 DIAGNOSIS — E66.812 CLASS 2 OBESITY DUE TO EXCESS CALORIES WITH BODY MASS INDEX (BMI) OF 38.0 TO 38.9 IN ADULT: ICD-10-CM

## 2025-06-20 PROCEDURE — 99214 OFFICE O/P EST MOD 30 MIN: CPT | Performed by: PHYSICIAN ASSISTANT

## 2025-06-20 RX ORDER — TIRZEPATIDE 5 MG/.5ML
5 INJECTION, SOLUTION SUBCUTANEOUS WEEKLY
Qty: 2 ML | Refills: 4 | Status: SHIPPED | OUTPATIENT
Start: 2025-06-20 | End: 2025-11-07

## 2025-06-20 NOTE — ASSESSMENT & PLAN NOTE
- Patient is pursuing Conservative Program with bundles with the dietician.  - Some form of birth control recommended while on weight loss medications.   - Initial weight loss goal of 5-10% weight loss for improved health-met       Initial: 220 lbs BMI 41.7  Last visit: 205.8  Current: 192  Change: -28 lbs (-13..8lbs since the last office visit)  Goal: 180 lbs      On zepbound 5mg.  Start weight 205.8. 6.5% weight loss . Doing well and she will continue   Past medication: wellbutrin/naltrexone    To continue diet and lifestyle modifications.  Doing excellent

## 2025-06-20 NOTE — PROGRESS NOTES
Assessment/Plan:    Obesity, Class II, BMI 35-39.9  - Patient is pursuing Conservative Program with bundles with the dietician.  - Some form of birth control recommended while on weight loss medications.   - Initial weight loss goal of 5-10% weight loss for improved health-met       Initial: 220 lbs BMI 41.7  Last visit: 205.8  Current: 192  Change: -28 lbs (-13..8lbs since the last office visit)  Goal: 180 lbs      On zepbound 5mg.  Start weight 205.8. 6.5% weight loss . Doing well and she will continue   Past medication: wellbutrin/naltrexone    To continue diet and lifestyle modifications.  Doing excellent    Dyslipidemia  - Taking Zetia. May improve with weight loss and lifestyle modification. Continue management with prescribing provider.            Diagnoses and all orders for this visit:    Obesity, Class II, BMI 35-39.9    Class 2 obesity due to excess calories with body mass index (BMI) of 38.0 to 38.9 in adult  -     tirzepatide (Zepbound) 5 mg/0.5 mL auto-injector; Inject 0.5 mL (5 mg total) under the skin once a week    Dyslipidemia          Subjective:   Chief Complaint   Patient presents with    Follow-up     MWM F/u; Waist 42in        Patient ID: Nicole A Harada  is a 49 y.o. female with excess weight/obesity here to pursue weight managment.  Patient is pursuing Conservative Program.     HPI  History of Present Illness  The patient presents for weight loss.    She reports a satisfactory weight loss journey, with a 6.5% reduction from her initial weight. She is not experiencing any adverse effects from the medication. She has previously attempted weight loss without the aid of Zepbound but finds the current process significantly more manageable. She is able to control her portion sizes and does not feel the need to eat until satiety. .    She maintains a balanced diet, ensuring adequate protein intake, and engages in regular physical activity, including walking, yoga, Pilates, and weight training. She  also ensures sufficient hydration, primarily through water consumption, although she admits to a decrease in fluid intake initially due to prolonged satiety. Her diet includes a morning protein shake, typically containing 20 to 30 grams of protein, fruit, and almond milk. She continues to consume lunch and dinner, with occasional rice cake snacks in the afternoon. Her dinner is usually the largest meal of the day, consisting of whatever the family is having, including vegetables and some form of protein. If she feels her protein intake was insufficient, she supplements with half a bottle of chocolate milk protein shake. She does not specifically monitor her protein intake but has a general awareness of her dietary needs. She weighs herself daily and has no current health concerns.    MEDICATIONS  Zepbound      Wt Readings from Last 10 Encounters:   06/20/25 87.3 kg (192 lb 6.4 oz)   04/08/25 93.4 kg (205 lb 12.8 oz)   08/19/24 86.2 kg (190 lb)   07/01/24 84.8 kg (187 lb)   06/18/24 84.8 kg (187 lb)   06/17/24 84.8 kg (187 lb)   05/03/24 82 kg (180 lb 10.7 oz)   04/12/24 83.3 kg (183 lb 9.6 oz)   01/05/24 85.6 kg (188 lb 11.5 oz)   12/01/23 84.3 kg (185 lb 12.8 oz)       Food logging:  Increased appetite/cravings:  Exercise:walking, yoga/pilates daily  Hydration:water 80 oz , green tea sometimes    Diet Recall:  B:Protein shake , fruit, almond milk  L: fruit and yogurt  S: rare rice cake  D: protein , starch , vegetable  S: 1/2 shake if not getting enough protein shake    The following portions of the patient's history were reviewed and updated as appropriate: She  has a past medical history of Abnormal Pap smear of cervix, Anxiety, Breast nodule (2009), Fibrocystic breast, GERD (gastroesophageal reflux disease), Headache(784.0), Heart murmur, Hyperlipidemia, Migraine with aura and without status migrainosus, not intractable, Morbid obesity with BMI of 40.0-44.9, adult (HCC), Peptic ulcer, and Polycystic ovary  syndrome (3/19).  She   Patient Active Problem List    Diagnosis Date Noted    Obesity, Class II, BMI 35-39.9 04/12/2024    Pars defect with spondylolisthesis 05/11/2023    Pancreatic cyst 05/05/2023    Osteoarthritis of multiple joints 05/05/2023    Pelvic pain 03/31/2023    Abdominal cramps 03/31/2023    RLQ abdominal pain 03/31/2023    Bleeding hemorrhoid 12/02/2019    Gastroesophageal reflux disease without esophagitis 04/19/2019    Vitamin D deficiency 04/09/2019    Periodic limb movements of sleep     Abnormal weight gain 02/20/2019    Irregular menstrual cycle 02/20/2019    Sleep disturbances 02/20/2019    Hirsutism 02/20/2019    Myalgia 02/20/2019    Dyslipidemia 01/18/2019    Class 2 obesity due to excess calories with body mass index (BMI) of 38.0 to 38.9 in adult 01/18/2019    Anxiety 01/08/2019    Dietary calcium deficiency 11/18/2016    Palpitations 05/13/2015    Vertigo 05/13/2015     She  has a past surgical history that includes [place] tooth extraction; orthodontic treatment; Colonoscopy; and pr sphincterotomy anal division sphincter spx (N/A, 3/20/2020).  Her family history includes Alcohol abuse in her cousin, cousin, and maternal uncle; Anxiety disorder in her cousin, paternal grandmother, and sister; Arthritis in her mother; Bipolar disorder in her paternal grandmother; Breast cancer (age of onset: 34) in her maternal aunt; Breast cancer additional onset in her maternal aunt; Cancer in her maternal grandmother and paternal grandmother; Depression in her paternal grandmother and sister; Diabetes in her paternal grandfather; Drug abuse in her cousin; Heart disease in her maternal uncle and maternal uncle; Hepatitis in her father; Hyperlipidemia in her father and mother; Hypertension in her father and mother; Kidney disease in her maternal grandfather and paternal grandfather; Lung cancer in her maternal grandmother and paternal grandmother; Nephrolithiasis in her family; Rheum arthritis in her  maternal grandmother and mother.  She  reports that she has never smoked. She has never used smokeless tobacco. She reports that she does not currently use alcohol. She reports that she does not use drugs.  Current Outpatient Medications   Medication Sig Dispense Refill    b complex vitamins capsule Take 1 capsule by mouth in the morning.      buPROPion (WELLBUTRIN XL) 300 mg 24 hr tablet Take 1 tablet (300 mg total) by mouth every morning 90 tablet 1    Cholecalciferol (VITAMIN D3) 2000 units TABS Take 2,000 Units by mouth in the morning.      ezetimibe (ZETIA) 10 mg tablet TAKE ONE TABLET BY MOUTH EVERY DAY 30 tablet 0    multivitamin (THERAGRAN) TABS Take 1 tablet by mouth in the morning.      tirzepatide (Zepbound) 5 mg/0.5 mL auto-injector Inject 0.5 mL (5 mg total) under the skin once a week 2 mL 4    Zinc 50 MG TABS Take by mouth      triamcinolone (KENALOG) 0.1 % cream Apply topically 2 (two) times a day (Patient not taking: Reported on 7/1/2024) 30 g 0     No current facility-administered medications for this visit.     Current Outpatient Medications on File Prior to Visit   Medication Sig    b complex vitamins capsule Take 1 capsule by mouth in the morning.    buPROPion (WELLBUTRIN XL) 300 mg 24 hr tablet Take 1 tablet (300 mg total) by mouth every morning    Cholecalciferol (VITAMIN D3) 2000 units TABS Take 2,000 Units by mouth in the morning.    ezetimibe (ZETIA) 10 mg tablet TAKE ONE TABLET BY MOUTH EVERY DAY    multivitamin (THERAGRAN) TABS Take 1 tablet by mouth in the morning.    Zinc 50 MG TABS Take by mouth    [DISCONTINUED] tirzepatide (Zepbound) 5 mg/0.5 mL auto-injector Inject 0.5 mL (5 mg total) under the skin once a week    triamcinolone (KENALOG) 0.1 % cream Apply topically 2 (two) times a day (Patient not taking: Reported on 7/1/2024)     No current facility-administered medications on file prior to visit.     She is allergic to lipitor [atorvastatin], morphine, and bactrim  "[sulfamethoxazole-trimethoprim]..    Review of Systems   Constitutional:  Negative for fever.   Respiratory:  Negative for shortness of breath.    Cardiovascular:  Negative for chest pain and palpitations.   Gastrointestinal:  Negative for abdominal pain, constipation, diarrhea and vomiting.   Genitourinary:  Negative for difficulty urinating.   Skin:  Negative for rash.   Neurological:  Negative for headaches.   Psychiatric/Behavioral:  Negative for dysphoric mood.        Objective:    /76 (BP Location: Left arm, Patient Position: Sitting)   Pulse 86   Temp 98.8 °F (37.1 °C) (Tympanic)   Resp 16   Ht 5' 1.5\" (1.562 m)   Wt 87.3 kg (192 lb 6.4 oz)   BMI 35.76 kg/m²      Physical Exam  Vitals and nursing note reviewed.   Constitutional:       General: She is not in acute distress.     Appearance: She is well-developed. She is obese.   HENT:      Head: Normocephalic and atraumatic.     Eyes:      Conjunctiva/sclera: Conjunctivae normal.     Neck:      Thyroid: No thyromegaly.   Pulmonary:      Effort: Pulmonary effort is normal. No respiratory distress.     Skin:     Findings: No rash (visible).     Neurological:      Mental Status: She is alert and oriented to person, place, and time.     Psychiatric:         Behavior: Behavior normal.         "

## 2025-06-24 ENCOUNTER — OFFICE VISIT (OUTPATIENT)
Dept: FAMILY MEDICINE CLINIC | Facility: CLINIC | Age: 50
End: 2025-06-24
Payer: MEDICARE

## 2025-06-24 VITALS
HEIGHT: 62 IN | DIASTOLIC BLOOD PRESSURE: 72 MMHG | HEART RATE: 76 BPM | WEIGHT: 193 LBS | OXYGEN SATURATION: 98 % | SYSTOLIC BLOOD PRESSURE: 118 MMHG | BODY MASS INDEX: 35.51 KG/M2

## 2025-06-24 DIAGNOSIS — E66.812 OBESITY, CLASS II, BMI 35-39.9: ICD-10-CM

## 2025-06-24 DIAGNOSIS — E78.5 DYSLIPIDEMIA: Primary | ICD-10-CM

## 2025-06-24 DIAGNOSIS — Z13.1 SCREENING FOR DIABETES MELLITUS: ICD-10-CM

## 2025-06-24 DIAGNOSIS — F41.9 ANXIETY: ICD-10-CM

## 2025-06-24 PROCEDURE — 99214 OFFICE O/P EST MOD 30 MIN: CPT | Performed by: NURSE PRACTITIONER

## 2025-06-24 RX ORDER — EZETIMIBE 10 MG/1
10 TABLET ORAL DAILY
Qty: 30 TABLET | Refills: 5 | Status: SHIPPED | OUTPATIENT
Start: 2025-06-24

## 2025-06-24 NOTE — ASSESSMENT & PLAN NOTE
Zetia 10 mg was reordered to be taken daily.  Patient was advised to continue to limit fatty and fried foods in her diet.  Repeat lipid panel was ordered to be completed prior to her next office visit.  Orders:  •  ezetimibe (ZETIA) 10 mg tablet; Take 1 tablet (10 mg total) by mouth daily  •  Lipid panel; Future

## 2025-06-24 NOTE — PROGRESS NOTES
Name: Nicole A Harada      : 1975      MRN: [id number]  Encounter Provider: SHERIDAN Hoffman  Encounter Date: 2025   Encounter department: [facility]  :  Assessment & Plan  Dyslipidemia  Zetia 10 mg was reordered to be taken daily.  Patient was advised to continue to limit fatty and fried foods in her diet.  Repeat lipid panel was ordered to be completed prior to her next office visit.  Orders:  •  ezetimibe (ZETIA) 10 mg tablet; Take 1 tablet (10 mg total) by mouth daily  •  Lipid panel; Future    Screening for diabetes mellitus    Orders:  •  Comprehensive metabolic panel; Future    Anxiety  Well-controlled on current regimen.       Obesity, Class II, BMI 35-39.9  Patient continues to follow with bariatrics regarding this.               Depression Screening and Follow-up Plan: Patient was screened for depression during today's encounter. They screened negative with a PHQ-2 score of 0.        History of Present Illness   Anxiety: Well-controlled on current regimen.    Obesity: The patient continues to follow with bariatrics regarding this and she is currently managed on Zepbound to help with weight loss.    Dyslipidemia: Patient's most recent lipid panel showed slightly elevated triglycerides and LDL.  Patient is currently managed on Zetia 10 mg daily.      Review of Systems   Constitutional:  Negative for chills and fever.   HENT:  Negative for ear pain and sore throat.    Eyes:  Negative for pain and visual disturbance.   Respiratory:  Negative for cough, chest tightness, shortness of breath and wheezing.    Cardiovascular:  Negative for chest pain, palpitations and leg swelling.   Gastrointestinal:  Negative for abdominal pain, constipation, diarrhea, nausea and vomiting.   Endocrine: Negative for cold intolerance and heat intolerance.   Genitourinary:  Negative for decreased urine volume, dysuria and hematuria.   Musculoskeletal:  Negative for arthralgias, back pain  "and myalgias.   Skin:  Negative for color change and rash.   Allergic/Immunologic: Negative for environmental allergies.   Neurological:  Negative for dizziness, seizures, syncope, weakness, light-headedness, numbness and headaches.   Hematological:  Negative for adenopathy.   Psychiatric/Behavioral:  Negative for confusion. The patient is not nervous/anxious.    All other systems reviewed and are negative.      Objective   /72 (BP Location: Right arm, Patient Position: Sitting, Cuff Size: Large)   Pulse 76   Ht 5' 1.5\" (1.562 m)   Wt 87.5 kg (193 lb)   LMP 06/17/2025   SpO2 98%   BMI 35.88 kg/m²      Physical Exam  Vitals and nursing note reviewed.   Constitutional:       General: She is not in acute distress.     Appearance: Normal appearance. She is well-developed. She is not ill-appearing.   HENT:      Head: Normocephalic.     Eyes:      Conjunctiva/sclera: Conjunctivae normal.       Cardiovascular:      Rate and Rhythm: Normal rate and regular rhythm.      Pulses: Normal pulses.           Carotid pulses are 2+ on the right side and 2+ on the left side.       Radial pulses are 2+ on the right side and 2+ on the left side.        Posterior tibial pulses are 2+ on the right side and 2+ on the left side.      Heart sounds: Normal heart sounds. No murmur heard.  Pulmonary:      Effort: Pulmonary effort is normal. No respiratory distress.      Breath sounds: Normal breath sounds. No decreased breath sounds, wheezing, rhonchi or rales.   Abdominal:      General: Abdomen is flat. There is no distension.      Palpations: Abdomen is soft.      Tenderness: There is no abdominal tenderness. There is no guarding.     Musculoskeletal:         General: No swelling. Normal range of motion.      Cervical back: Normal range of motion and neck supple.      Right lower leg: No edema.      Left lower leg: No edema.     Skin:     General: Skin is warm and dry.      Capillary Refill: Capillary refill takes less than 2 " seconds.     Neurological:      General: No focal deficit present.      Mental Status: She is alert and oriented to person, place, and time.     Psychiatric:         Mood and Affect: Mood normal.         Behavior: Behavior normal.         Thought Content: Thought content normal.         Judgment: Judgment normal.

## 2025-07-07 ENCOUNTER — HOSPITAL ENCOUNTER (OUTPATIENT)
Dept: MRI IMAGING | Facility: HOSPITAL | Age: 50
Discharge: HOME | End: 2025-07-07
Payer: MEDICARE

## 2025-07-07 ENCOUNTER — ANNUAL EXAM (OUTPATIENT)
Dept: OBGYN CLINIC | Facility: CLINIC | Age: 50
End: 2025-07-07
Payer: MEDICARE

## 2025-07-07 VITALS
BODY MASS INDEX: 34.96 KG/M2 | HEIGHT: 62 IN | SYSTOLIC BLOOD PRESSURE: 112 MMHG | WEIGHT: 190 LBS | DIASTOLIC BLOOD PRESSURE: 78 MMHG

## 2025-07-07 DIAGNOSIS — Z12.31 ENCOUNTER FOR SCREENING MAMMOGRAM FOR BREAST CANCER: ICD-10-CM

## 2025-07-07 DIAGNOSIS — K86.2 PANCREATIC CYST: ICD-10-CM

## 2025-07-07 DIAGNOSIS — Z01.419 ENCOUNTER FOR GYNECOLOGICAL EXAMINATION WITHOUT ABNORMAL FINDING: Primary | ICD-10-CM

## 2025-07-07 PROCEDURE — 99396 PREV VISIT EST AGE 40-64: CPT | Performed by: NURSE PRACTITIONER

## 2025-07-07 PROCEDURE — 74183 MRI ABD W/O CNTR FLWD CNTR: CPT

## 2025-07-07 PROCEDURE — A9585 GADOBUTROL INJECTION: HCPCS | Performed by: PHYSICIAN ASSISTANT

## 2025-07-07 PROCEDURE — G1004 CDSM NDSC: HCPCS

## 2025-07-07 RX ORDER — GADOBUTROL 604.72 MG/ML
8 INJECTION INTRAVENOUS
Status: COMPLETED | OUTPATIENT
Start: 2025-07-07 | End: 2025-07-07

## 2025-07-07 RX ADMIN — GADOBUTROL 8 ML: 604.72 INJECTION INTRAVENOUS at 08:29

## 2025-07-07 NOTE — PROGRESS NOTES
"Assessment / Plan    Assessment & Plan  Encounter for gynecological examination without abnormal finding  Normal well woman exam  7/2024 Pap with HPV negative  Repeat 3 to 5 years  Colonoscopy up-to-date       Encounter for screening mammogram for breast cancer  Has order and appointment           Subjective      Nicole A Harada is a 49 y.o. female who presents for her annual gynecologic exam.    8/2022 w/ c/o AUB; normal pelvic US, benign endometrial bx.    7/2024 pap/hpv negative; 1/2021 pap HPV negative  Hx of abnormal paps.  STD hx negative  6/2024 mammo birads 0 on right; rt dx US benign.  Return to routine screening-- scheduled 7/8/25  Mat aunt breast ca  7/2022 colonoscopy, NL; 10 yr update     Pertinent hx: migraine with aura, HTN, BMI      Sexually active: yes,   Condom use: yes  Nutrition: Body mass index is 35.32 kg/m².; taking  Zepboud starting 4/2025  Calcium intake: given guidelines  Exercise: 5x per week  Safety: feels safe     Periods are regular  Current contraception: condoms  History of abnormal Pap smear: yes -   Family history of breast,uterine, ovarian or colon cancer: yes - mat aunt breast ca      The following portions of the patient's history were reviewed and updated as appropriate: allergies, current medications, past family history, past medical history, past social history, past surgical history, and problem list.    Review of Systems      Review of Systems  Breasts:  Negative for skin changes, dimpling, asymmetry, nipple discharge, redness, tenderness or palpable masses    Objective     *patient agrees to exam without a chaperone present.     /78 (BP Location: Left arm, Patient Position: Sitting, Cuff Size: Standard)   Ht 5' 1.5\" (1.562 m)   Wt 86.2 kg (190 lb)   LMP 06/17/2025   BMI 35.32 kg/m²   Physical Exam  Constitutional:       General: She is not in acute distress.     Appearance: Normal appearance. She is well-developed. She is not ill-appearing or diaphoretic. "      Comments: Body mass index is 35.32 kg/m².     HENT:      Head: Normocephalic and atraumatic.     Eyes:      Pupils: Pupils are equal, round, and reactive to light.     Neck:      Thyroid: No thyromegaly.   Pulmonary:      Effort: Pulmonary effort is normal.   Chest:   Breasts:     Breasts are symmetrical.      Right: No inverted nipple, mass, nipple discharge, skin change or tenderness.      Left: No inverted nipple, mass, nipple discharge, skin change or tenderness.   Abdominal:      General: There is no distension.      Palpations: Abdomen is soft. There is no mass.      Tenderness: There is no abdominal tenderness. There is no guarding or rebound.   Genitourinary:     General: Normal vulva.      Exam position: Lithotomy position.      Labia:         Right: No rash, tenderness, lesion or injury.         Left: No rash, tenderness, lesion or injury.       Vagina: No signs of injury and foreign body. No vaginal discharge, erythema, tenderness or bleeding.      Cervix: No cervical motion tenderness, discharge or friability.      Uterus: Not enlarged and not tender.       Adnexa:         Right: No mass or tenderness.          Left: No mass or tenderness.        Rectum: Normal.     Musculoskeletal:      Cervical back: Neck supple.   Lymphadenopathy:      Cervical: No cervical adenopathy.      Upper Body:      Right upper body: No supraclavicular adenopathy.      Left upper body: No supraclavicular adenopathy.     Skin:     General: Skin is warm and dry.     Neurological:      General: No focal deficit present.      Mental Status: She is alert and oriented to person, place, and time.     Psychiatric:         Mood and Affect: Mood normal.         Behavior: Behavior normal.         Thought Content: Thought content normal.         Judgment: Judgment normal.

## 2025-07-07 NOTE — PATIENT INSTRUCTIONS
"Patient Education     Diet and health   The Basics   Written by the doctors and editors at [facility]   Why is it important to eat a healthy diet? -- It's important to eat a healthy diet because eating the right foods can keep you healthy now and later in life. It can lower the risk of problems like heart disease, diabetes, high blood pressure, and some types of cancer. It can also help you live longer and improve your quality of life.  What kind of diet is best? -- There is no 1 specific diet that experts recommend for everyone. People choose what foods to eat for many different reasons. These include personal preference, culture, Congregation, allergies or intolerances, and nutritional goals. People also need to consider the cost and availability of different foods.  In general, experts recommend a diet that:   Includes lots of vegetables, fruits, beans, nuts, and whole grains   Limits red and processed meats, unhealthy fats, sugar, salt, and alcohol  What are dietary patterns? -- A dietary \"pattern\" means generally eating certain types of foods while limiting others. Some people need to follow a specific dietary pattern because of their health needs. For example, if you have high blood pressure, your doctor might recommend a diet low in salt.  If you are trying to improve your health in general, choosing a healthy dietary pattern can help. This does not have to mean being very strict about what you eat or avoid. The goal is to think about getting plenty of healthy foods while limiting less healthy foods.  Examples of dietary patterns include:   Mediterranean diet - This involves eating a lot of fruits, vegetables, nuts, and whole grains, and uses olive oil instead of other fats. It also includes some fish, poultry, and dairy products, but not a lot of red meat. Following this diet can help your overall health, and might even lower your risk of having a stroke.   Plant-based diets - These patterns focus on vegetables, " "fruits, grains, beans, and nuts. They limit or avoid food that comes from animals, such as meat and dairy. There are different types of plant-based diets, including vegetarian and vegan.   Low-fat diet - A low-fat diet involves limiting calories from fat. This might help some people keep weight off if that is their goal, but it does not have many other health benefits. If you choose to follow a low-fat diet, it is also important to focus on getting lots of whole grains, legumes, fruits, and vegetables. Limit refined grains and sugar.   Low-cholesterol diet - Cholesterol is found in foods with a lot of saturated fat, like red meat, butter, and cheese. A low-cholesterol diet focuses on limiting the amount of cholesterol that you eat. Limiting the cholesterol in your diet can also help lower the amount of unhealthy fats that you eat.  Which foods are especially healthy? -- Foods that are especially healthy include:   Fruits and vegetables - Eating a diet with lots of fruits and vegetables can help prevent heart disease and stroke. It might also help prevent certain types of cancer. Try to eat fruits and vegetables at each meal and also for snacks. If you don't have fresh fruits and vegetables available, you can eat frozen or canned ones instead. Doctors recommend eating at least 5 servings of fruits or vegetables each day.   Whole grains - Whole-grain foods include 100 percent whole-wheat bread, steel cut oats, and whole-grain pasta. These are healthier than foods made with \"refined\" grains, like white bread and white rice. Eating lots of whole grains instead of refined grains has been shown to help with weight control. It can also lower the risk of several health problems, including colon cancer, heart disease, and diabetes. Doctors recommend that most people try to eat 5 to 8 servings of whole-grain, high-fiber foods each day.   Foods with fiber - Eating foods with a lot of fiber can help prevent heart disease and " "stroke. If you have type 2 diabetes, it can also help control your blood sugar. Foods that have a lot of fiber include vegetables, fruits, beans, nuts, oatmeal, and whole-grain breads and cereals. You can tell how much fiber is in a food by reading the nutrition label (figure 1). Doctors recommend that most people eat about 25 to 34 grams of fiber each day.   Foods with calcium and vitamin D - Babies, children, and adults need calcium and vitamin D to help keep their bones strong. Adults also need calcium and vitamin D to help prevent osteoporosis. Osteoporosis is a condition that causes bones to get \"thin\" and break more easily than usual. Different foods and drinks have calcium and vitamin D in them (figure 2). People who don't get enough calcium and vitamin D in their diet might need to take a supplement. Doctors recommend that most people have 2 to 3 servings of foods with calcium and vitamin D each day.   Foods with protein - Protein helps your muscles and bones stay strong. Healthy foods with a lot of protein include chicken, fish, eggs, beans, nuts, and soy products. Red meat also has a lot of protein, but it also contains fats, which can be unhealthy. Doctors recommend that most people try to eat about 5 servings of protein each day.   Healthy fats - There are different types of fats. Some types of fats are better for your body than others. Healthy fats are \"monounsaturated\" or \"polyunsaturated\" fats. These are found in fatty fish, nuts and nut butters, and avocados. Use plant-based oils when cooking. Examples of these oils include olive, canola, safflower, sunflower, and corn oil. Eating foods with healthy fats, while avoiding or limiting foods with unhealthy fats, might lower the risk of heart disease.   Foods with folate - Folate is a vitamin that is important for pregnant people, since it helps prevent certain birth defects. It is also called \"folic acid.\" Anyone who could get pregnant should get at " "least 400 micrograms of folic acid daily, whether or not they are actively trying to get pregnant. Folate is found in many breakfast cereals, oranges, orange juice, and green leafy vegetables.  What foods should I avoid or limit? -- To eat a healthy diet, there are some things that you should avoid or limit. They include:   Unhealthy fats - \"Trans\" fats are especially unhealthy. They are found in margarines, many fast foods, and some store-bought baked goods. \"Saturated\" fats are found in animal products like meats, egg yolks, butter, cheese, and full-fat milk products. Unhealthy fats can raise your cholesterol level and increase your chance of getting heart disease.   Sugar - To have a healthy diet, it's important to limit or avoid added sugar, sweets, and refined grains. Refined grains are found in white bread, white rice, most pastas, and most packaged \"snack\" foods.  Avoiding sugar-sweetened beverages, like soda and sports drinks, can also help improve your health.  Avoid canned fruits in \"heavy\" syrup.   Red and processed meats - Studies have shown that eating a lot of red meat can increase your risk of certain health problems, including heart disease and cancer. You should limit the amount of red meat that you eat. This is also true for processed meats like sausage, hot dogs, and gil.  Can I drink alcohol as part of a healthy diet? -- Not drinking alcohol at all is the healthiest choice. Regular drinking can raise a person's chances of getting liver disease and certain types of cancers. In females, even 1 drink a day can increase the risk of getting breast cancer.  If you do choose to drink, most doctors recommend limiting alcohol to no more than:   1 drink a day for females   2 drinks a day for males  The limits are different because, generally, the female body takes longer to break down alcohol.  How many calories do I need each day? -- Calories give your body energy. The number of calories that you need " "each day depends on your weight, height, age, sex, and how active you are.  Your doctor or nurse can tell you about how many calories you should eat each day. You can also work with a dietitian (nutrition expert) to learn more about your dietary needs and options.  What if I am having trouble improving my diet? -- It can be hard to change the way that you eat. Remember that even small changes can improve your health.  Here are some tips that might help:   Try to make fruits and vegetables part of every meal. If you don't have fresh fruits and vegetables, frozen or canned are good options. Look for products without added salt or sugar.   Keep a bowl of fruit out for snacking.   When you can, choose whole grains instead of refined grains. Choose chicken, fish, and beans instead of red meat and cheese.   Try to eat prepared and processed foods less often.   Try flavored seltzer or water instead of soda or juice.   When eating at fast food restaurants, look for healthier items, like broiled chicken or salad.  If you have questions about which foods you should or should not eat, ask your doctor, nurse, or dietitian. The right diet for you will depend, in part, on your health and any medical conditions you have.  All topics are updated as new evidence becomes available and our peer review process is complete.  This topic retrieved from [employer] on: Feb 28, 2024.  Topic 65136 Version 28.0  Release: 32.2.4 - C32.58  © 2024 UpToDate, Inc. and/or its affiliates. All rights reserved.  figure 1: Nutrition label - Fiber     This is an example of a nutrition label. To figure out how much fiber is in a food, look for the line that says \"Dietary Fiber.\" It's also important to look at the serving size. This food has 7 grams of fiber in each serving, and each serving is 1 cup.  Graphic 01877 Version 8.0  figure 2: Foods and drinks with calcium and vitamin D     Foods rich in calcium include ice cream, soy milk, breads, kale, " "broccoli, milk, cheese, cottage cheese, almonds, yogurt, ready-to-eat cereals, beans, and tofu. Foods rich in vitamin D include milk, fortified plant-based \"milks\" (soy, almond), canned tuna fish, cod liver oil, yogurt, ready-to-eat-cereals, cooked salmon, canned sardines, mackerel, and eggs. Some of these foods are rich in both.  Graphic 22289 Version 4.0  Consumer Information Use and Disclaimer   Disclaimer: This generalized information is a limited summary of diagnosis, treatment, and/or medication information. It is not meant to be comprehensive and should be used as a tool to help the user understand and/or assess potential diagnostic and treatment options. It does NOT include all information about conditions, treatments, medications, side effects, or risks that may apply to a specific patient. It is not intended to be medical advice or a substitute for the medical advice, diagnosis, or treatment of a health care provider based on the health care provider's examination and assessment of a patient's specific and unique circumstances. Patients must speak with a health care provider for complete information about their health, medical questions, and treatment options, including any risks or benefits regarding use of medications. This information does not endorse any treatments or medications as safe, effective, or approved for treating a specific patient. UpToDate, Inc. and its affiliates disclaim any warranty or liability relating to this information or the use thereof.The use of this information is governed by the Terms of Use, available at https://www.wolterskluwer.com/en/know/clinical-effectiveness-terms. 2024© UpToDate, Inc. and its affiliates and/or licensors. All rights reserved.  Copyright   © 2024 UpToDate, Inc. and/or its affiliates. All rights reserved.  Patient Education     Calcium Rich Diet   About this topic   Calcium is one of the most important minerals and is found in almost all parts of your " body. It makes your teeth and bones strong and healthy. Your body does not make calcium. It is important for you to eat calcium rich foods to get enough calcium. It also helps your body:  Make blood clots  Keep your heartbeat normal  Helps blood move throughout the body  Release hormones  Release enzymes  Send and get signals between your nerves and brain  Make muscles work well         What will the results be?   It is important to have a good amount of calcium in your food. Calcium helps your body work well. It is very important during every life stage. Calcium may also keep you from losing bone mass. This is osteopenia. It may help keep you from having weak bones. This is osteoporosis.  What drugs may be needed?   The doctor may order calcium and vitamin D supplements. You need vitamin D to help your body take in the calcium. Your calcium supplement may have vitamin D in it.  Talk to your doctor about:  If it is OK to take your calcium with food.  How often to take your calcium supplement throughout the day.  All the drugs you are taking. Be sure to include all prescription and over-the-counter (OTC) drugs, and herbal supplements. Tell the doctor about any drug allergy. Bring a list of drugs you take with you. Some drugs may cause problems with how your body takes in calcium.  Will physical activity be limited?   No, physical activities will not be limited. It is good for you to do light exercises and to stay active.  What changes to diet are needed?   You will need to watch how much calcium is in the foods you eat. Your doctor will talk to you about the right amount of calcium for you. How much calcium you need is based on your age and life stage.  When is this diet used?   This diet is used when your normal diet is low in calcium. It is needed to raise the amount of calcium in your diet. Our bodies do not take in calcium well as we get older.  Who should not use this diet?   People with too much calcium in  their blood should not use this diet. This is called hypercalcemia.  What foods are good to eat?   Milk, yogurt, and cheese products are naturally high in calcium.  These foods have smaller amounts of calcium. If they are eaten often and in large amounts they can be good sources of calcium:  Oysters; dried fruit like figs and raisins; green leafy vegetables like broccoli, collards, kale, mustard greens, bok armando; soy beans; oranges  [place] and sardines. Be sure to eat the soft bones.  Nuts like almonds and [place] nuts, sunflower seeds, tahini, dried beans  Food products with calcium added to them like orange juice, tofu, cereal, bread; almond milk, rice milk, soy milk  What foods should be limited or avoided?   People who eat many kinds of foods do not have to be worried about limiting or avoiding certain foods.   What problems could happen?   Some people may get kidney stones. This may happen after taking high amounts of calcium over a long time. Calcium from food does not seem to cause kidney stones.  Hard stools.  Too much calcium may interfere with your body’s ability to absorb iron and zinc.  When do I need to call the doctor?   Call your doctor if you have concerns about your diet. Tell your doctor if you are allergic to any of the foods in your diet.  Helpful tips   If you have problems digesting milk, try lactose-free milk. You may also use a product to help you take in lactose.  Talk with your doctor if you are a vegetarian and do not eat dairy products. Your doctor can help you make sure you get the amount of calcium your body needs.  Last Reviewed Date   2021-03-12  Consumer Information Use and Disclaimer   This generalized information is a limited summary of diagnosis, treatment, and/or medication information. It is not meant to be comprehensive and should be used as a tool to help the user understand and/or assess potential diagnostic and treatment options. It does NOT include all information about  conditions, treatments, medications, side effects, or risks that may apply to a specific patient. It is not intended to be medical advice or a substitute for the medical advice, diagnosis, or treatment of a health care provider based on the health care provider's examination and assessment of a patient’s specific and unique circumstances. Patients must speak with a health care provider for complete information about their health, medical questions, and treatment options, including any risks or benefits regarding use of medications. This information does not endorse any treatments or medications as safe, effective, or approved for treating a specific patient. UpToDate, Inc. and its affiliates disclaim any warranty or liability relating to this information or the use thereof. The use of this information is governed by the Terms of Use, available at https://www.Scoreoid.com/en/know/clinical-effectiveness-terms   Copyright   Copyright © 2024 UpToDate, Inc. and its affiliates and/or licensors. All rights reserved.  Patient Education     Exercise and movement   The Basics   Written by the doctors and editors at [employer]   What are the benefits of movement? -- Moving your body has many benefits. It can:   Burn calories, which helps people manage their weight   Help control blood sugar levels in people with diabetes   Lower blood pressure, especially in people with high blood pressure   Lower stress, and help with depression and anxiety   Keep bones strong, so they don't get thin and break easily   Lower the chance of dying from heart disease  Adding even small amounts of physical activity to your daily routine can improve your health.  What are the main types of exercise? -- There are 3 main types of exercise:   Aerobic exercise - This raises your heart rate. Examples include walking, running, dancing, riding a bike, and swimming.   Muscle strengthening - This helps make your muscles stronger. You can do it using  weights, exercise bands, or weight machines. You can also use your own body weight, as with push-ups, or by lifting items in your home, like jugs of water.   Stretching - These help your muscles and joints move more easily.  It's important to have all 3 types in your exercise program. That way, your body, muscles, and joints can be as healthy as possible.  Should I talk to my doctor or nurse before I start exercising? -- If you have not exercised before or have not exercised in a long time, talk with your doctor or nurse before you start a very active exercise program.  If you have heart disease or risk factors for heart disease (like high blood pressure or diabetes), your doctor or nurse might recommend that you have an exercise test before starting an exercise program.  When you begin an exercise program, start slowly. For example, do the exercise at a slow pace or for a few minutes only. Over time, you can exercise faster and for longer periods of time.  What should I do when I exercise? -- Each time you exercise, you should:   Warm up - This can help keep you from hurting your muscles when you exercise. To warm up, do a light aerobic exercise (such as walking slowly) or stretch for 5 to 10 minutes.   Work out - Try to get a mix of aerobic exercise, muscle strengthening, and stretching. During an aerobic workout, you can walk fast, swim, run, or use an exercise machine, for example. Other activities, like dancing or playing tennis, are also forms of aerobic exercise. You should also take time to stretch all of your joints, including your neck, shoulders, back, hips, and knees. At least 2 times a week, you can do muscle strengthening exercises as part of your workout.   Cool down - This helps keep you from feeling dizzy after you exercise and helps prevent muscle cramps. To cool down, you can stretch or do a light aerobic exercise for 5 minutes.  Some people go to a gym or do group exercise classes. But you can  exercise even without these things. Some exercises can be done even in a small space. You can also try online videos or smartphone apps to get ideas for different types of exercise.  How often should I exercise? -- Doctors recommend that people exercise at least 30 minutes a day, on 5 or more days of the week.  If you can't exercise for 30 minutes straight, try to exercise for 10 minutes at a time, 3 or 4 times a day. Even exercising for shorter amounts of time is good for you, especially if it means spending less time sitting.  When should I call my doctor or nurse? -- If you have any of the following symptoms when you exercise, stop exercising and call your doctor or nurse right away:   Pain or pressure in your chest, arms, throat, jaw, or back   Nausea or vomiting   Feeling like your heart is fluttering or racing very fast   Feeling dizzy or faint  What if I don't have time to exercise? -- Many people have very busy lives and might not think that they have time to exercise. But it's important to try to find time, even if you are tired or work a lot. Exercise can increase your energy level, which can make you feel better and might even help you get more work done.  Even if it's hard to set aside a lot of time to exercise, you can still improve your health by moving your body more. There are many ways that you can be more active. For example, you can:   Take the stairs instead of the elevator.   Park in a parking space that is farther away from the door.   Take a longer route when you walk from one place to another.  Spending a lot of time sitting still (for example, watching TV or working on the computer) is bad for your health. Try to get up and move around whenever you can. Even small amounts of movement, like taking short walks, doing household chores, or gardening, can improve your health. Finding activities that you enjoy, or doing them with other people, can help you add more movement into your daily  life.  What else should I do when I exercise? -- To exercise safely and avoid problems, it's important to:   Drink fluids during and after exercising (but avoid drinks with a lot of caffeine or sugar).   Avoid exercising outside if it is too hot or cold.   Wear layers of clothes, so that you can take them off if you get too hot.   Wear shoes that fit well and support your feet.   Be aware of your surroundings if you exercise outside.  All topics are updated as new evidence becomes available and our peer review process is complete.  This topic retrieved from [employer] on: May 18, 2024.  Topic 82267 Version 31.0  Release: 32.4.3 - C32.137  © 2024 UpToDate, Inc. and/or its affiliates. All rights reserved.  Consumer Information Use and Disclaimer   Disclaimer: This generalized information is a limited summary of diagnosis, treatment, and/or medication information. It is not meant to be comprehensive and should be used as a tool to help the user understand and/or assess potential diagnostic and treatment options. It does NOT include all information about conditions, treatments, medications, side effects, or risks that may apply to a specific patient. It is not intended to be medical advice or a substitute for the medical advice, diagnosis, or treatment of a health care provider based on the health care provider's examination and assessment of a patient's specific and unique circumstances. Patients must speak with a health care provider for complete information about their health, medical questions, and treatment options, including any risks or benefits regarding use of medications. This information does not endorse any treatments or medications as safe, effective, or approved for treating a specific patient. UpToDate, Inc. and its affiliates disclaim any warranty or liability relating to this information or the use thereof.The use of this information is governed by the Terms of Use, available at  https://www.woltersPlayGigauwer.com/en/know/clinical-effectiveness-terms. 2024© [employer] and its affiliates and/or licensors. All rights reserved.  Copyright   © 2024 [employer] and/or its affiliates. All rights reserved.

## 2025-07-08 ENCOUNTER — HOSPITAL ENCOUNTER (OUTPATIENT)
Dept: MAMMOGRAPHY | Facility: MEDICAL CENTER | Age: 50
Discharge: HOME | End: 2025-07-08
Payer: MEDICARE

## 2025-07-08 VITALS — BODY MASS INDEX: 34.97 KG/M2 | HEIGHT: 62 IN | WEIGHT: 190.04 LBS

## 2025-07-08 DIAGNOSIS — Z12.31 ENCOUNTER FOR SCREENING MAMMOGRAM FOR BREAST CANCER: ICD-10-CM

## 2025-07-08 PROCEDURE — 77067 SCR MAMMO BI INCL CAD: CPT

## 2025-07-08 PROCEDURE — 77063 BREAST TOMOSYNTHESIS BI: CPT

## 2025-07-11 DIAGNOSIS — E66.812 CLASS 2 OBESITY DUE TO EXCESS CALORIES WITH BODY MASS INDEX (BMI) OF 38.0 TO 38.9 IN ADULT: ICD-10-CM

## 2025-07-11 DIAGNOSIS — E66.09 CLASS 2 OBESITY DUE TO EXCESS CALORIES WITH BODY MASS INDEX (BMI) OF 38.0 TO 38.9 IN ADULT: ICD-10-CM

## 2025-07-11 RX ORDER — TIRZEPATIDE 5 MG/.5ML
5 INJECTION, SOLUTION SUBCUTANEOUS WEEKLY
Qty: 2 ML | Refills: 4 | Status: SHIPPED | OUTPATIENT
Start: 2025-07-11 | End: 2025-11-28

## (undated) DEVICE — INTENDED FOR TISSUE SEPARATION, AND OTHER PROCEDURES THAT REQUIRE A SHARP SURGICAL BLADE TO PUNCTURE OR CUT.: Brand: BARD-PARKER SAFETY BLADES SIZE 10, STERILE

## (undated) DEVICE — LIGHT GLOVE GREEN

## (undated) DEVICE — SUT VICRYL 5-0 RB-1 27 IN J213H

## (undated) DEVICE — 3M™ DURAPORE™ SURGICAL TAPE 1538-3, 3 INCH X 10 YARD (7,5CM X 9,1M), 4 ROLLS/BOX: Brand: 3M™ DURAPORE™

## (undated) DEVICE — SURGICEL 2 X 3

## (undated) DEVICE — SYRINGE 50ML LL

## (undated) DEVICE — SPONGE 4 X 4 XRAY 16 PLY STRL LF RFD

## (undated) DEVICE — PENCIL ELECTROSURG E-Z CLEAN -0035H

## (undated) DEVICE — STERILE POLYISOPRENE POWDER-FREE SURGICAL GLOVES: Brand: PROTEXIS

## (undated) DEVICE — MINOR PROCEDURE DRAPE: Brand: CONVERTORS

## (undated) DEVICE — STERILE POLYISOPRENE POWDER-FREE SURGICAL GLOVES WITH EMOLLIENT COATING: Brand: PROTEXIS

## (undated) DEVICE — BULB SYRINGE,IRRIGATION WITH PROTECTIVE CAP: Brand: DOVER

## (undated) DEVICE — LUBRICANT SURGILUBE TUBE 4 OZ  FLIP TOP

## (undated) DEVICE — 4-PORT MANIFOLD: Brand: NEPTUNE 2

## (undated) DEVICE — YANKAUER,OPEN TIP, NO VENT: Brand: ARGYLE

## (undated) DEVICE — SYRINGE 10ML LL CONTROL TOP

## (undated) DEVICE — 1820 FOAM BLOCK NEEDLE COUNTER: Brand: DEVON

## (undated) DEVICE — SKIN MARKER DUAL TIP WITH RULER CAP, FLEXIBLE RULER AND LABELS: Brand: DEVON

## (undated) DEVICE — NEEDLE 25G X 1 1/2

## (undated) DEVICE — NEEDLE BLUNT 18 G X 1 1/2IN

## (undated) DEVICE — SPONGE STICK WITH PVP-I: Brand: KENDALL

## (undated) DEVICE — BASIC PACK: Brand: CONVERTORS

## (undated) DEVICE — TUBING SUCTION 5MM X 12 FT

## (undated) DEVICE — TELFA NON-ADHERENT ABSORBENT DRESSING: Brand: TELFA